# Patient Record
Sex: MALE | Race: WHITE | NOT HISPANIC OR LATINO | Employment: OTHER | URBAN - METROPOLITAN AREA
[De-identification: names, ages, dates, MRNs, and addresses within clinical notes are randomized per-mention and may not be internally consistent; named-entity substitution may affect disease eponyms.]

---

## 2020-06-20 ENCOUNTER — HOSPITAL ENCOUNTER (OUTPATIENT)
Dept: RADIOLOGY | Facility: MEDICAL CENTER | Age: 70
End: 2020-06-20
Payer: MEDICARE

## 2020-06-20 ENCOUNTER — APPOINTMENT (OUTPATIENT)
Dept: CARDIOLOGY | Facility: MEDICAL CENTER | Age: 70
DRG: 233 | End: 2020-06-20
Attending: STUDENT IN AN ORGANIZED HEALTH CARE EDUCATION/TRAINING PROGRAM
Payer: MEDICARE

## 2020-06-20 ENCOUNTER — HOSPITAL ENCOUNTER (INPATIENT)
Facility: MEDICAL CENTER | Age: 70
LOS: 11 days | DRG: 233 | End: 2020-07-01
Attending: EMERGENCY MEDICINE | Admitting: INTERNAL MEDICINE
Payer: MEDICARE

## 2020-06-20 DIAGNOSIS — I21.4 NSTEMI (NON-ST ELEVATED MYOCARDIAL INFARCTION) (HCC): ICD-10-CM

## 2020-06-20 DIAGNOSIS — Z95.1 S/P CABG X 3: ICD-10-CM

## 2020-06-20 DIAGNOSIS — J44.9 CHRONIC OBSTRUCTIVE PULMONARY DISEASE, UNSPECIFIED COPD TYPE (HCC): ICD-10-CM

## 2020-06-20 DIAGNOSIS — I25.9 CHEST PAIN DUE TO MYOCARDIAL ISCHEMIA, UNSPECIFIED ISCHEMIC CHEST PAIN TYPE: ICD-10-CM

## 2020-06-20 PROBLEM — K86.1 PANCREATITIS, CHRONIC (HCC): Status: ACTIVE | Noted: 2020-06-20

## 2020-06-20 PROBLEM — I10 HYPERTENSION: Status: ACTIVE | Noted: 2020-06-20

## 2020-06-20 PROBLEM — N40.0 BPH (BENIGN PROSTATIC HYPERPLASIA): Status: ACTIVE | Noted: 2020-06-20

## 2020-06-20 PROBLEM — N41.9 PROSTATITIS: Status: ACTIVE | Noted: 2020-06-20

## 2020-06-20 PROBLEM — G20.A1 PARKINSON'S DISEASE (HCC): Status: ACTIVE | Noted: 2020-06-20

## 2020-06-20 PROBLEM — E11.9 TYPE 2 DIABETES MELLITUS (HCC): Status: ACTIVE | Noted: 2020-06-20

## 2020-06-20 LAB
ALBUMIN SERPL BCP-MCNC: 3.6 G/DL (ref 3.2–4.9)
ALBUMIN/GLOB SERPL: 1.4 G/DL
ALP SERPL-CCNC: 61 U/L (ref 30–99)
ALT SERPL-CCNC: 6 U/L (ref 2–50)
ANION GAP SERPL CALC-SCNC: 9 MMOL/L (ref 7–16)
APTT PPP: 77.2 SEC (ref 24.7–36)
APTT PPP: 97 SEC (ref 24.7–36)
AST SERPL-CCNC: 21 U/L (ref 12–45)
BASOPHILS # BLD AUTO: 1 % (ref 0–1.8)
BASOPHILS # BLD: 0.09 K/UL (ref 0–0.12)
BILIRUB SERPL-MCNC: 0.2 MG/DL (ref 0.1–1.5)
BUN SERPL-MCNC: 13 MG/DL (ref 8–22)
CALCIUM SERPL-MCNC: 8.6 MG/DL (ref 8.5–10.5)
CHLORIDE SERPL-SCNC: 99 MMOL/L (ref 96–112)
CHOLEST SERPL-MCNC: 138 MG/DL (ref 100–199)
CK MB SERPL-MCNC: 35.1 NG/ML (ref 0–5)
CO2 SERPL-SCNC: 23 MMOL/L (ref 20–33)
CREAT SERPL-MCNC: 0.85 MG/DL (ref 0.5–1.4)
EKG IMPRESSION: NORMAL
EKG IMPRESSION: NORMAL
EOSINOPHIL # BLD AUTO: 0.1 K/UL (ref 0–0.51)
EOSINOPHIL NFR BLD: 1.1 % (ref 0–6.9)
ERYTHROCYTE [DISTWIDTH] IN BLOOD BY AUTOMATED COUNT: 48.6 FL (ref 35.9–50)
EST. AVERAGE GLUCOSE BLD GHB EST-MCNC: 140 MG/DL
GLOBULIN SER CALC-MCNC: 2.6 G/DL (ref 1.9–3.5)
GLUCOSE SERPL-MCNC: 122 MG/DL (ref 65–99)
HBA1C MFR BLD: 6.5 % (ref 0–5.6)
HCT VFR BLD AUTO: 43.6 % (ref 42–52)
HDLC SERPL-MCNC: 41 MG/DL
HGB BLD-MCNC: 14 G/DL (ref 14–18)
IMM GRANULOCYTES # BLD AUTO: 0.04 K/UL (ref 0–0.11)
IMM GRANULOCYTES NFR BLD AUTO: 0.4 % (ref 0–0.9)
INR PPP: 1 (ref 0.87–1.13)
LDLC SERPL CALC-MCNC: 77 MG/DL
LV EJECT FRACT  99904: 55
LV EJECT FRACT MOD 2C 99903: 65.38
LV EJECT FRACT MOD 4C 99902: 61.23
LV EJECT FRACT MOD BP 99901: 64.05
LYMPHOCYTES # BLD AUTO: 2.1 K/UL (ref 1–4.8)
LYMPHOCYTES NFR BLD: 22.6 % (ref 22–41)
MAGNESIUM SERPL-MCNC: 1.8 MG/DL (ref 1.5–2.5)
MCH RBC QN AUTO: 30.8 PG (ref 27–33)
MCHC RBC AUTO-ENTMCNC: 32.1 G/DL (ref 33.7–35.3)
MCV RBC AUTO: 96 FL (ref 81.4–97.8)
MONOCYTES # BLD AUTO: 0.87 K/UL (ref 0–0.85)
MONOCYTES NFR BLD AUTO: 9.4 % (ref 0–13.4)
NEUTROPHILS # BLD AUTO: 6.09 K/UL (ref 1.82–7.42)
NEUTROPHILS NFR BLD: 65.5 % (ref 44–72)
NRBC # BLD AUTO: 0 K/UL
NRBC BLD-RTO: 0 /100 WBC
NT-PROBNP SERPL IA-MCNC: 1207 PG/ML (ref 0–125)
PLATELET # BLD AUTO: 217 K/UL (ref 164–446)
PMV BLD AUTO: 9.2 FL (ref 9–12.9)
POTASSIUM SERPL-SCNC: 4.1 MMOL/L (ref 3.6–5.5)
PROT SERPL-MCNC: 6.2 G/DL (ref 6–8.2)
PROTHROMBIN TIME: 13.5 SEC (ref 12–14.6)
RBC # BLD AUTO: 4.54 M/UL (ref 4.7–6.1)
SODIUM SERPL-SCNC: 131 MMOL/L (ref 135–145)
TRIGL SERPL-MCNC: 100 MG/DL (ref 0–149)
TROPONIN T SERPL-MCNC: 261 NG/L (ref 6–19)
TROPONIN T SERPL-MCNC: 267 NG/L (ref 6–19)
TROPONIN T SERPL-MCNC: 463 NG/L (ref 6–19)
TROPONIN T SERPL-MCNC: 523 NG/L (ref 6–19)
WBC # BLD AUTO: 9.3 K/UL (ref 4.8–10.8)

## 2020-06-20 PROCEDURE — 770020 HCHG ROOM/CARE - TELE (206)

## 2020-06-20 PROCEDURE — 83880 ASSAY OF NATRIURETIC PEPTIDE: CPT

## 2020-06-20 PROCEDURE — 85025 COMPLETE CBC W/AUTO DIFF WBC: CPT

## 2020-06-20 PROCEDURE — 96365 THER/PROPH/DIAG IV INF INIT: CPT

## 2020-06-20 PROCEDURE — 93005 ELECTROCARDIOGRAM TRACING: CPT

## 2020-06-20 PROCEDURE — 99285 EMERGENCY DEPT VISIT HI MDM: CPT

## 2020-06-20 PROCEDURE — 82553 CREATINE MB FRACTION: CPT

## 2020-06-20 PROCEDURE — 700111 HCHG RX REV CODE 636 W/ 250 OVERRIDE (IP): Performed by: STUDENT IN AN ORGANIZED HEALTH CARE EDUCATION/TRAINING PROGRAM

## 2020-06-20 PROCEDURE — 85730 THROMBOPLASTIN TIME PARTIAL: CPT

## 2020-06-20 PROCEDURE — 93306 TTE W/DOPPLER COMPLETE: CPT

## 2020-06-20 PROCEDURE — 84153 ASSAY OF PSA TOTAL: CPT

## 2020-06-20 PROCEDURE — 83036 HEMOGLOBIN GLYCOSYLATED A1C: CPT

## 2020-06-20 PROCEDURE — 84484 ASSAY OF TROPONIN QUANT: CPT

## 2020-06-20 PROCEDURE — A9270 NON-COVERED ITEM OR SERVICE: HCPCS | Performed by: STUDENT IN AN ORGANIZED HEALTH CARE EDUCATION/TRAINING PROGRAM

## 2020-06-20 PROCEDURE — 93005 ELECTROCARDIOGRAM TRACING: CPT | Performed by: STUDENT IN AN ORGANIZED HEALTH CARE EDUCATION/TRAINING PROGRAM

## 2020-06-20 PROCEDURE — 93306 TTE W/DOPPLER COMPLETE: CPT | Mod: 26 | Performed by: INTERNAL MEDICINE

## 2020-06-20 PROCEDURE — 80061 LIPID PANEL: CPT

## 2020-06-20 PROCEDURE — 700102 HCHG RX REV CODE 250 W/ 637 OVERRIDE(OP): Performed by: STUDENT IN AN ORGANIZED HEALTH CARE EDUCATION/TRAINING PROGRAM

## 2020-06-20 PROCEDURE — 85610 PROTHROMBIN TIME: CPT

## 2020-06-20 PROCEDURE — 83735 ASSAY OF MAGNESIUM: CPT

## 2020-06-20 PROCEDURE — 99223 1ST HOSP IP/OBS HIGH 75: CPT | Mod: AI,GC | Performed by: INTERNAL MEDICINE

## 2020-06-20 PROCEDURE — 700111 HCHG RX REV CODE 636 W/ 250 OVERRIDE (IP): Performed by: EMERGENCY MEDICINE

## 2020-06-20 PROCEDURE — 93010 ELECTROCARDIOGRAM REPORT: CPT | Performed by: INTERNAL MEDICINE

## 2020-06-20 PROCEDURE — 99221 1ST HOSP IP/OBS SF/LOW 40: CPT | Performed by: INTERNAL MEDICINE

## 2020-06-20 PROCEDURE — 700117 HCHG RX CONTRAST REV CODE 255: Performed by: STUDENT IN AN ORGANIZED HEALTH CARE EDUCATION/TRAINING PROGRAM

## 2020-06-20 PROCEDURE — 80053 COMPREHEN METABOLIC PANEL: CPT

## 2020-06-20 PROCEDURE — 36415 COLL VENOUS BLD VENIPUNCTURE: CPT

## 2020-06-20 RX ORDER — ALBUTEROL SULFATE 90 UG/1
2 AEROSOL, METERED RESPIRATORY (INHALATION)
Status: DISCONTINUED | OUTPATIENT
Start: 2020-06-20 | End: 2020-06-25

## 2020-06-20 RX ORDER — PANTOPRAZOLE SODIUM 40 MG/1
40 TABLET, DELAYED RELEASE ORAL 2 TIMES DAILY
COMMUNITY

## 2020-06-20 RX ORDER — AMOXICILLIN 250 MG
2 CAPSULE ORAL 2 TIMES DAILY
Status: DISCONTINUED | OUTPATIENT
Start: 2020-06-20 | End: 2020-06-25

## 2020-06-20 RX ORDER — POLYETHYLENE GLYCOL 3350 17 G/17G
1 POWDER, FOR SOLUTION ORAL
Status: DISCONTINUED | OUTPATIENT
Start: 2020-06-20 | End: 2020-06-25

## 2020-06-20 RX ORDER — VALACYCLOVIR HYDROCHLORIDE 500 MG/1
1000 TABLET, FILM COATED ORAL DAILY
COMMUNITY

## 2020-06-20 RX ORDER — ATORVASTATIN CALCIUM 80 MG/1
80 TABLET, FILM COATED ORAL EVERY EVENING
Status: DISCONTINUED | OUTPATIENT
Start: 2020-06-20 | End: 2020-07-01 | Stop reason: HOSPADM

## 2020-06-20 RX ORDER — TAMSULOSIN HYDROCHLORIDE 0.4 MG/1
0.4 CAPSULE ORAL DAILY
Status: DISCONTINUED | OUTPATIENT
Start: 2020-06-20 | End: 2020-07-01 | Stop reason: HOSPADM

## 2020-06-20 RX ORDER — FINASTERIDE 5 MG/1
5 TABLET, FILM COATED ORAL DAILY
Status: DISCONTINUED | OUTPATIENT
Start: 2020-06-20 | End: 2020-07-01 | Stop reason: HOSPADM

## 2020-06-20 RX ORDER — HEPARIN SODIUM 5000 [USP'U]/100ML
INJECTION, SOLUTION INTRAVENOUS CONTINUOUS
Status: DISCONTINUED | OUTPATIENT
Start: 2020-06-20 | End: 2020-06-25

## 2020-06-20 RX ORDER — VALACYCLOVIR HYDROCHLORIDE 500 MG/1
1000 TABLET, FILM COATED ORAL DAILY
Status: COMPLETED | OUTPATIENT
Start: 2020-06-20 | End: 2020-06-30

## 2020-06-20 RX ORDER — OMEPRAZOLE 20 MG/1
20 CAPSULE, DELAYED RELEASE ORAL DAILY
Status: DISCONTINUED | OUTPATIENT
Start: 2020-06-20 | End: 2020-06-27

## 2020-06-20 RX ORDER — MAGNESIUM SULFATE HEPTAHYDRATE 40 MG/ML
2 INJECTION, SOLUTION INTRAVENOUS ONCE
Status: COMPLETED | OUTPATIENT
Start: 2020-06-20 | End: 2020-06-20

## 2020-06-20 RX ORDER — LOSARTAN POTASSIUM 25 MG/1
25 TABLET ORAL DAILY
COMMUNITY

## 2020-06-20 RX ORDER — HEPARIN SODIUM 1000 [USP'U]/ML
2600 INJECTION, SOLUTION INTRAVENOUS; SUBCUTANEOUS PRN
Status: DISCONTINUED | OUTPATIENT
Start: 2020-06-20 | End: 2020-06-25

## 2020-06-20 RX ORDER — PAROXETINE 30 MG/1
30 TABLET, FILM COATED ORAL DAILY
COMMUNITY
End: 2020-07-08

## 2020-06-20 RX ORDER — SULFAMETHOXAZOLE AND TRIMETHOPRIM 800; 160 MG/1; MG/1
1 TABLET ORAL EVERY 12 HOURS
Status: COMPLETED | OUTPATIENT
Start: 2020-06-20 | End: 2020-06-27

## 2020-06-20 RX ORDER — PAROXETINE 10 MG/1
40 TABLET, FILM COATED ORAL DAILY
Status: DISCONTINUED | OUTPATIENT
Start: 2020-06-20 | End: 2020-07-01 | Stop reason: HOSPADM

## 2020-06-20 RX ORDER — ALPRAZOLAM 1 MG/1
1 TABLET ORAL NIGHTLY PRN
COMMUNITY

## 2020-06-20 RX ORDER — BISACODYL 10 MG
10 SUPPOSITORY, RECTAL RECTAL
Status: DISCONTINUED | OUTPATIENT
Start: 2020-06-20 | End: 2020-06-25

## 2020-06-20 RX ORDER — FINASTERIDE 5 MG/1
5 TABLET, FILM COATED ORAL DAILY
COMMUNITY

## 2020-06-20 RX ORDER — TAMSULOSIN HYDROCHLORIDE 0.4 MG/1
0.4 CAPSULE ORAL DAILY
COMMUNITY

## 2020-06-20 RX ADMIN — HEPARIN SODIUM 1200 UNITS: 5000 INJECTION, SOLUTION INTRAVENOUS at 08:13

## 2020-06-20 RX ADMIN — VALACYCLOVIR HYDROCHLORIDE 1000 MG: 500 TABLET, FILM COATED ORAL at 17:43

## 2020-06-20 RX ADMIN — HUMAN ALBUMIN MICROSPHERES AND PERFLUTREN 3 ML: 10; .22 INJECTION, SOLUTION INTRAVENOUS at 16:58

## 2020-06-20 RX ADMIN — PAROXETINE HYDROCHLORIDE 40 MG: 20 TABLET, FILM COATED ORAL at 17:42

## 2020-06-20 RX ADMIN — SULFAMETHOXAZOLE AND TRIMETHOPRIM 1 TABLET: 800; 160 TABLET ORAL at 17:42

## 2020-06-20 RX ADMIN — ATORVASTATIN CALCIUM 80 MG: 80 TABLET, FILM COATED ORAL at 17:42

## 2020-06-20 RX ADMIN — TAMSULOSIN HYDROCHLORIDE 0.4 MG: 0.4 CAPSULE ORAL at 17:42

## 2020-06-20 RX ADMIN — PANCRELIPASE 6000 UNITS: 30000; 6000; 19000 CAPSULE, DELAYED RELEASE PELLETS ORAL at 17:43

## 2020-06-20 RX ADMIN — FINASTERIDE 5 MG: 5 TABLET, FILM COATED ORAL at 17:42

## 2020-06-20 RX ADMIN — OMEPRAZOLE 20 MG: 20 CAPSULE, DELAYED RELEASE ORAL at 10:43

## 2020-06-20 RX ADMIN — HEPARIN SODIUM 1050 UNITS/HR: 5000 INJECTION, SOLUTION INTRAVENOUS at 06:56

## 2020-06-20 RX ADMIN — CARBIDOPA AND LEVODOPA 1 TABLET: 25; 100 TABLET ORAL at 17:41

## 2020-06-20 RX ADMIN — MAGNESIUM SULFATE 2 G: 2 INJECTION INTRAVENOUS at 17:44

## 2020-06-20 ASSESSMENT — ENCOUNTER SYMPTOMS
PALPITATIONS: 0
TREMORS: 1
COUGH: 0
EYE PAIN: 0
NERVOUS/ANXIOUS: 0
MYALGIAS: 0
LOSS OF CONSCIOUSNESS: 0
ORTHOPNEA: 1
CHILLS: 0
DIZZINESS: 0
EYE DISCHARGE: 0
DIARRHEA: 0
COUGH: 1
FEVER: 0
CONSTIPATION: 0
SPEECH CHANGE: 0
BRUISES/BLEEDS EASILY: 0
HEMOPTYSIS: 0
SORE THROAT: 0
FLANK PAIN: 0
SHORTNESS OF BREATH: 1
VOMITING: 1
BLURRED VISION: 0
ABDOMINAL PAIN: 0
SEIZURES: 0
DEPRESSION: 1
WHEEZING: 0
FOCAL WEAKNESS: 0
ABDOMINAL PAIN: 1
DOUBLE VISION: 0
WEIGHT LOSS: 1
DEPRESSION: 0
WEAKNESS: 1
SENSORY CHANGE: 0
NAUSEA: 1
DIZZINESS: 1

## 2020-06-20 ASSESSMENT — COGNITIVE AND FUNCTIONAL STATUS - GENERAL
DAILY ACTIVITIY SCORE: 24
SUGGESTED CMS G CODE MODIFIER MOBILITY: CH
SUGGESTED CMS G CODE MODIFIER DAILY ACTIVITY: CH
MOBILITY SCORE: 24

## 2020-06-20 ASSESSMENT — LIFESTYLE VARIABLES
TOTAL SCORE: 0
AVERAGE NUMBER OF DAYS PER WEEK YOU HAVE A DRINK CONTAINING ALCOHOL: 0
CONSUMPTION TOTAL: NEGATIVE
TOTAL SCORE: 0
ON A TYPICAL DAY WHEN YOU DRINK ALCOHOL HOW MANY DRINKS DO YOU HAVE: 0
EVER_SMOKED: NEVER
EVER HAD A DRINK FIRST THING IN THE MORNING TO STEADY YOUR NERVES TO GET RID OF A HANGOVER: NO
HAVE YOU EVER FELT YOU SHOULD CUT DOWN ON YOUR DRINKING: NO
TOTAL SCORE: 0
DO YOU DRINK ALCOHOL: NO
HAVE PEOPLE ANNOYED YOU BY CRITICIZING YOUR DRINKING: NO
EVER FELT BAD OR GUILTY ABOUT YOUR DRINKING: NO
HOW MANY TIMES IN THE PAST YEAR HAVE YOU HAD 5 OR MORE DRINKS IN A DAY: 0

## 2020-06-20 ASSESSMENT — PATIENT HEALTH QUESTIONNAIRE - PHQ9
1. LITTLE INTEREST OR PLEASURE IN DOING THINGS: NOT AT ALL
SUM OF ALL RESPONSES TO PHQ9 QUESTIONS 1 AND 2: 0
2. FEELING DOWN, DEPRESSED, IRRITABLE, OR HOPELESS: NOT AT ALL

## 2020-06-20 ASSESSMENT — PAIN DESCRIPTION - DESCRIPTORS: DESCRIPTORS: ACHING

## 2020-06-20 ASSESSMENT — FIBROSIS 4 INDEX: FIB4 SCORE: 2.73

## 2020-06-20 NOTE — ED NOTES
Claudio from Lab called with critical result of troponin 256 at 0726. Critical lab result read back to Claudio.   Dr. Marcum notified of critical lab result at 0726.  Critical lab result read back by Dr. Marcum.

## 2020-06-20 NOTE — ASSESSMENT & PLAN NOTE
History of prostatitis, on fifth week of Bactrim at home.  Finished possible 6 weeks course of Bactrim.  Patient not sure about exact duration  -Repeat UA normal

## 2020-06-20 NOTE — ASSESSMENT & PLAN NOTE
-Known history of COPD, on inhalers at home, ran out of medication recently. Not in exacerbation. CXR from outside grossly unremarkable except for right basilar atelectasis.  -RT protocol,  - discharge home with home O2 support

## 2020-06-20 NOTE — ASSESSMENT & PLAN NOTE
-Known history of type 2 diabetes mellitus.  On metformin at home.  HbA1c 6.5   -continue metformin  -Maintain euglycemia, blood glucose 140 to 180 mg/dL

## 2020-06-20 NOTE — ASSESSMENT & PLAN NOTE
Patient admitted with NSTEMI.  Cardiac cath [6/22/2020] remarkable for multivessel disease, EF 45%.  CABG recommended. S/p CABG on 6/25/2020.  HbA1c 6.5, lipid panel normal.      -Continue ASA, plavix, statin, lasix  -Continue low-dose metoprolol.   -ACEI/ARB with held due to low SBP, restart PRN  -medically clear for discharge, with outpatient Cardiology follow up

## 2020-06-20 NOTE — ED TRIAGE NOTES
"Ricky Spangler  69 y.o. male  Chief Complaint   Patient presents with   • Chest Pain     Transfer from [facility] for NSTEMI, heparin drip started before transport, pt denies CP currently. HR 57 /58. Pt recently admitted for pancreatitis          Hx: pancreatitis, depression, copd, HTN, GERD, parkinson's, Prostate and pancreatic cancer, DM     Blood Pressure : 106/58, Pulse: (!) 58, Respiration: 16, Temperature: 36.1 °C (97 °F), Height: 170.2 cm (5' 7\"), Weight: 82 kg (180 lb 12.4 oz), BMI (Calculated): 28.31, BSA (Calculated): 2, Pulse Oximetry: 93 %, O2 (LPM): 2, O2 Delivery Device: Nasal Cannula    "

## 2020-06-20 NOTE — ED NOTES
Report from Perla MARIN, assumed care of pt.     Verified heparin infusion at 1050 units/hr. Pt lying in stretcher with eyes closed. Waiting for admission. Call light within reach.

## 2020-06-20 NOTE — PROGRESS NOTES
2 RN skin check complete.   Devices in place NA.  Skin assessed under devices intact.  Confirmed pressure ulcers found on NA.  New potential pressure ulcers noted on NA.   The following interventions in place, Patient turns often in bed, patient encouraged to walk often in bed.

## 2020-06-20 NOTE — ED PROVIDER NOTES
"ED Provider Note    CHIEF COMPLAINT  Chief Complaint   Patient presents with   • Chest Pain     Transfer from [facility] for NSTEMI, heparin drip started before transport, pt denies CP currently. HR 57 /58. Pt recently admitted for pancreatitis        HPI  Ricky Spangler is a 69 y.o. male who presents to the emergency department by air ambulance from outside facility for chest pain,NSTEMI.  Patient states yesterday afternoon he began feeling ill, nauseous with a couple episodes of vomiting.  He had anterior chest discomfort, sharp, pressure that radiated into his upper abdomen.  No back pain.  No syncope.  No palpitations.  Shortness of breath noted as well.  No cough or congestion.  No fever chills.    Distant history colon cancer, patient states currently with pancreatic and prostate cancer, he chose not to treat this.  History of COPD, Parkinson's, hypertension, diabetes.    Denies heart disease, previous MI, stenting.    REVIEW OF SYSTEMS  See HPI for further details. All other systems are negative.    PAST MEDICAL HISTORY   As above    SOCIAL HISTORY  Social History     Tobacco Use   • Smoking status: Not on file   Substance and Sexual Activity   • Alcohol use: Not on file   • Drug use: Not on file   • Sexual activity: Not on file   Denies drugs and alcohol    SURGICAL HISTORY  patient denies any surgical history    CURRENT MEDICATIONS  Home Medications    **Home medications have not yet been reviewed for this encounter**     See list.    ALLERGIES  Allergies   Allergen Reactions   • Tetanus Toxoid Swelling       PHYSICAL EXAM  VITAL SIGNS: /68   Pulse 61   Temp 36.1 °C (97 °F) (Temporal)   Resp 19   Ht 1.702 m (5' 7\")   Wt 82 kg (180 lb 12.4 oz)   SpO2 93%   BMI 28.31 kg/m²   Pulse ox interpretation: I interpret this pulse ox as normal.  Constitutional: Alert in no apparent distress.  HENT: Normocephalic, atraumatic. Bilateral external ears normal, Nose normal. Moist mucous membranes.  "   Eyes: Pupils are equal and reactive, Conjunctiva normal.   Neck: Normal range of motion, Supple.  No JVD.  Lymphatic: No lymphadenopathy noted.   Cardiovascular: Regular rate and rhythm, no murmurs. Distal pulses intact.  No peripheral edema.  Thorax & Lungs: Normal breath sounds.  No wheezing/rales/ronchi. No increased work of breathing, clipped speech or retractions.   Abdomen: Soft, non-distended, non-tender to palpation. No palpable or pulsatile masses.   Skin: Warm, Dry, No erythema, No rash.   Musculoskeletal: Good range of motion in all major joints.   Neurologic: Alert oriented x4.  Speech clear and cohesive.  Moves 4 extremity spontaneously.  Psychiatric: Affect normal, Judgment normal, Mood normal.       DIAGNOSTIC STUDIES / PROCEDURES    LABS  Results for orders placed or performed during the hospital encounter of 06/20/20   CBC w/ Differential   Result Value Ref Range    WBC 9.3 4.8 - 10.8 K/uL    RBC 4.54 (L) 4.70 - 6.10 M/uL    Hemoglobin 14.0 14.0 - 18.0 g/dL    Hematocrit 43.6 42.0 - 52.0 %    MCV 96.0 81.4 - 97.8 fL    MCH 30.8 27.0 - 33.0 pg    MCHC 32.1 (L) 33.7 - 35.3 g/dL    RDW 48.6 35.9 - 50.0 fL    Platelet Count 217 164 - 446 K/uL    MPV 9.2 9.0 - 12.9 fL    Neutrophils-Polys 65.50 44.00 - 72.00 %    Lymphocytes 22.60 22.00 - 41.00 %    Monocytes 9.40 0.00 - 13.40 %    Eosinophils 1.10 0.00 - 6.90 %    Basophils 1.00 0.00 - 1.80 %    Immature Granulocytes 0.40 0.00 - 0.90 %    Nucleated RBC 0.00 /100 WBC    Neutrophils (Absolute) 6.09 1.82 - 7.42 K/uL    Lymphs (Absolute) 2.10 1.00 - 4.80 K/uL    Monos (Absolute) 0.87 (H) 0.00 - 0.85 K/uL    Eos (Absolute) 0.10 0.00 - 0.51 K/uL    Baso (Absolute) 0.09 0.00 - 0.12 K/uL    Immature Granulocytes (abs) 0.04 0.00 - 0.11 K/uL    NRBC (Absolute) 0.00 K/uL   Complete Metabolic Panel (CMP)   Result Value Ref Range    Sodium 131 (L) 135 - 145 mmol/L    Potassium 4.1 3.6 - 5.5 mmol/L    Chloride 99 96 - 112 mmol/L    Co2 23 20 - 33 mmol/L    Anion  Gap 9.0 7.0 - 16.0    Glucose 122 (H) 65 - 99 mg/dL    Bun 13 8 - 22 mg/dL    Calcium 8.6 8.5 - 10.5 mg/dL    AST(SGOT) 21 12 - 45 U/L    ALT(SGPT) 6 2 - 50 U/L    Alkaline Phosphatase 61 30 - 99 U/L    Total Bilirubin 0.2 0.1 - 1.5 mg/dL    Albumin 3.6 3.2 - 4.9 g/dL    Total Protein 6.2 6.0 - 8.2 g/dL    Globulin 2.6 1.9 - 3.5 g/dL    A-G Ratio 1.4 g/dL   Troponin STAT   Result Value Ref Range    Troponin T 267 (H) 6 - 19 ng/L   APTT   Result Value Ref Range    APTT 77.2 (H) 24.7 - 36.0 sec   PT/INR   Result Value Ref Range    PT 13.5 12.0 - 14.6 sec    INR 1.00 0.87 - 1.13   EKG   Result Value Ref Range    Report       [facility] Emergency Dept.    Test Date:  2020  Pt Name:    MAX SALAS               Department: ER  MRN:        [id number]                      Room:        12  Gender:     Male                         Technician: [id number]  :        1950                   Requested By:REX WINSTON  Order #:    [id number]                    Reading MD: REX WINSTON DO    Measurements  Intervals                                Axis  Rate:       56                           P:          54  NJ:         152                          QRS:        -72  QRSD:       118                          T:          35  QT:         404  QTc:        390    Interpretive Statements  SINUS BRADYCARDIA  INCOMPLETE RIGHT BUNDLE BRANCH BLOCK  PROBABLE INFERIOR INFARCT, AGE INDETERMINATE  No previous ECG available for comparison  Electronically Signed On 2020 7:59:42 PDT by REX WINSTON DO           RADIOLOGY  OUTSIDE IMAGES-CT ABDOMEN /PELVIS   Final Result      OUTSIDE IMAGES-DX CHEST   Final Result          COURSE & MEDICAL DECISION MAKING  Patient transferred from outside facility for chest pain, NSTEMI.  Rise with heparin drip infusing.  Nitroglycerin paste to anterior chest wall.  Patient describes 1-2 out of 10 chest pain, much improved with outside interventions.  Troponin elevated  0.24 at outside facility (normal less than 0.05).  Chemistry within normal limits.  No coagulopathy.  Normal sed rate.  Nonspecific mild leukocytosis, WBC 11.7.  EKG with right bundle branch block, no ischemia.  Chest x-ray without acute cardiopulmonary process.  Mild right basilar atelectasis/scarring.  Of note, patient also had CT abdomen pelvis with contrast earlier this week, no mass, lymphadenopathy or focal inflammatory process.  Moderate to severe left-sided colonic diverticulosis without diverticulitis.  Transferred for higher level of care, cardiology consultation.    Patient seen evaluated upon arrival in room 12.  Minimal, 1-2 out of 10, chest pain persistent at this time.  States much improved since onset yesterday and before presentation overnight.  EKG with sinus bradycardia, rate 56, no STEMI or other ischemia.  Continuous telemetry thus far without ectopy or arrhythmia.  Repeat labs, trend troponin.  Continue heparin drip, nitroglycerin paste.  Plan admission for further evaluation, cardiology consultation.    0640 - [facility] IM resident is aware of the patient agreeable to consultation.    FINAL IMPRESSION  (I21.4) NSTEMI (non-ST elevated myocardial infarction) (HCC)  (I25.9) Chest pain due to myocardial ischemia, unspecified ischemic chest pain type      Electronically signed by: Kendra Marcum D.O., 6/20/2020 7:54 AM      This dictation was created using voice recognition software. The accuracy of the dictation is limited to the abilities of the software. I expect there may be some errors of grammar and possibly content. The nursing notes were reviewed and certain aspects of this information were incorporated into this note.

## 2020-06-20 NOTE — NON-PROVIDER
History & Physical Note    Date of Admission: 6/20/2020  Admission Status: Emergency  [facility] Team: [facility] Blue Team and [facility] IM [facility] Team  Attending: Kendra Marcum D.O.   Senior Resident: Dr. Campbell   Intern: Dr. Suarez  Contact Number: [phone]    Chief Complaint: Chest pain    History of Present Illness (HPI):   Ricky is a 69 y.o. male with a past medical history of pancreatitis, depression, COPD, HTN, GERD, Parkinson's, prostate and pancreatic cancer, DM transferred 6/20/2020 from [facility] for NSTEMI. Heparin drip was started before transport.     Patient reports sharp gradually worsening substernal chest pain radiating to both shoulder over the past two weeks. Chest pain was associated with nausea, generalized weakness, and three episodes of vomiting over the past 3 days. Pain is improved now.       In ER, HR 57, /58 > 96/48. Troponin 0.24  (outside) > 0.35 (outside) > 265 ([facility]). Normal chemistry, normal sed rate, mild leukocytosis, WBC 11.7. EKG showed RBBB, no ischemia. Chest X-Ray negative. DEJUAN score 4 (20% risk at 14 days of: all-cause mortality, new or recurrent MI, or severe recurrent ischemia requiring urgent revascularization). HEART score 7 (Risk of MACE of 50-65%).      Review of Systems:   Review of Systems   Constitutional: Positive for weight loss. Negative for chills and fever.   HENT: Negative for congestion and sore throat.    Eyes: Negative for blurred vision.   Respiratory: Positive for cough and shortness of breath.    Cardiovascular: Positive for chest pain and orthopnea. Negative for leg swelling.   Gastrointestinal: Positive for abdominal pain, nausea and vomiting. Negative for diarrhea.   Genitourinary: Positive for dysuria and urgency.   Musculoskeletal: Positive for joint pain.   Skin: Negative for rash.   Neurological: Positive for dizziness, tremors and weakness. Negative for focal weakness.   Psychiatric/Behavioral: Positive for depression.         Past Medical  History:   Pancreatic cancer  Prostate cancer  Pancreatitis  COPD  HTN  GERD  Parkinson's  DM  Colon polyps  Past Medical History was reviewed with patient.    Past Surgical History:   Colectomy 10 years ago, polyps  Past Surgical History was reviewed with patient.    Medications: Medications have been reviewed with patient.    Home Medications    Medication Sig Taking? Last Dose Authorizing Provider   carbidopa-levodopa (SINEMET)  MG Tab Take 1 Tab by mouth 3 times a day. Yes  Physician Outpatient   valACYclovir (VALTREX) 500 MG Tab Take 1,000 mg by mouth every day. Yes  Physician Outpatient   losartan (COZAAR) 25 MG Tab Take 25 mg by mouth every day. Yes  Physician Outpatient   pantoprazole (PROTONIX) 40 MG Tablet Delayed Response Take 40 mg by mouth 2 times a day. Yes  Physician Outpatient   tamsulosin (FLOMAX) 0.4 MG capsule Take 0.4 mg by mouth every day. Yes  Physician Outpatient   PARoxetine (PAXIL) 30 MG Tab Take 40 mg by mouth every day. Yes  Physician Outpatient   finasteride (PROSCAR) 5 MG Tab Take 5 mg by mouth every day. Yes  Physician Outpatient   pancrelipase, Lip-Prot-Amyl, (CREON 6000) 6000 units Cap DR Particles Take 1 Cap by mouth 3 times a day, with meals. Yes  Physician Outpatient   metFORMIN (GLUCOPHAGE) 500 MG Tab Take 500 mg by mouth 2 times a day, with meals. Yes  Physician Outpatient   ALPRAZolam (XANAX) 1 MG Tab Take 1 mg by mouth at bedtime as needed for Sleep. Yes  Physician Outpatient      Allergies: Allergies have been reviewed with patient.  Allergies   Allergen Reactions   • Tetanus Toxoid Swelling       Family History:   Parkinson's- Mother, sister, brother  DM- Sister  HTN- Sister  Heart disease- Father dies at 52    Social History:   Tobacco: Quit at age 21   Alcohol: Denies  Recreational drugs (illegal and prescription):  Denies   Employment: Retired  Activity Level: Ambulates with cane   Living situation:  Lives in [place] with wife  Recent travel:  None  Primary Care  Provider: reviewed Dr. Marcos Damon [facility]  Other (stressors, spirituality, exposures):  None  Physical Exam:   Vitals:  Temp:  [36.1 °C (97 °F)] 36.1 °C (97 °F)  Pulse:  [58] 58  Resp:  [16] 16  BP: (106)/(58) 106/58  SpO2:  [93 %] 93 %    Physical Exam  Constitutional:       General: He is not in acute distress.  HENT:      Head: Normocephalic and atraumatic.   Eyes:      Extraocular Movements: Extraocular movements intact.      Conjunctiva/sclera: Conjunctivae normal.   Neck:      Musculoskeletal: Normal range of motion and neck supple.   Cardiovascular:      Rate and Rhythm: Normal rate and regular rhythm.      Pulses: Normal pulses.      Heart sounds: No murmur. No friction rub. No gallop.    Pulmonary:      Effort: Pulmonary effort is normal. No respiratory distress.      Breath sounds: Rhonchi present. No wheezing or rales.   Abdominal:      General: Bowel sounds are normal.      Palpations: Abdomen is soft. There is no mass.      Tenderness: There is abdominal tenderness. There is no rebound.      Comments: TTP over RUQ, LUQ, and epigastric region    Musculoskeletal: Normal range of motion.         General: No swelling.      Right lower leg: No edema.      Left lower leg: No edema.   Skin:     General: Skin is warm and dry.      Capillary Refill: Capillary refill takes less than 2 seconds.   Neurological:      General: No focal deficit present.      Mental Status: He is alert.      Cranial Nerves: No cranial nerve deficit.         Labs:   Results for orders placed or performed during the hospital encounter of 06/20/20   CBC w/ Differential   Result Value Ref Range    WBC 9.3 4.8 - 10.8 K/uL    RBC 4.54 (L) 4.70 - 6.10 M/uL    Hemoglobin 14.0 14.0 - 18.0 g/dL    Hematocrit 43.6 42.0 - 52.0 %    MCV 96.0 81.4 - 97.8 fL    MCH 30.8 27.0 - 33.0 pg    MCHC 32.1 (L) 33.7 - 35.3 g/dL    RDW 48.6 35.9 - 50.0 fL    Platelet Count 217 164 - 446 K/uL    MPV 9.2 9.0 - 12.9 fL    Neutrophils-Polys 65.50 44.00 -  72.00 %    Lymphocytes 22.60 22.00 - 41.00 %    Monocytes 9.40 0.00 - 13.40 %    Eosinophils 1.10 0.00 - 6.90 %    Basophils 1.00 0.00 - 1.80 %    Immature Granulocytes 0.40 0.00 - 0.90 %    Nucleated RBC 0.00 /100 WBC    Neutrophils (Absolute) 6.09 1.82 - 7.42 K/uL    Lymphs (Absolute) 2.10 1.00 - 4.80 K/uL    Monos (Absolute) 0.87 (H) 0.00 - 0.85 K/uL    Eos (Absolute) 0.10 0.00 - 0.51 K/uL    Baso (Absolute) 0.09 0.00 - 0.12 K/uL    Immature Granulocytes (abs) 0.04 0.00 - 0.11 K/uL    NRBC (Absolute) 0.00 K/uL   Complete Metabolic Panel (CMP)   Result Value Ref Range    Sodium 131 (L) 135 - 145 mmol/L    Potassium 4.1 3.6 - 5.5 mmol/L    Chloride 99 96 - 112 mmol/L    Co2 23 20 - 33 mmol/L    Anion Gap 9.0 7.0 - 16.0    Glucose 122 (H) 65 - 99 mg/dL    Bun 13 8 - 22 mg/dL    Calcium 8.6 8.5 - 10.5 mg/dL    AST(SGOT) 21 12 - 45 U/L    ALT(SGPT) 6 2 - 50 U/L    Alkaline Phosphatase 61 30 - 99 U/L    Total Bilirubin 0.2 0.1 - 1.5 mg/dL    Albumin 3.6 3.2 - 4.9 g/dL    Total Protein 6.2 6.0 - 8.2 g/dL    Globulin 2.6 1.9 - 3.5 g/dL    A-G Ratio 1.4 g/dL   Troponin STAT   Result Value Ref Range    Troponin T 267 (H) 6 - 19 ng/L   APTT   Result Value Ref Range    APTT 77.2 (H) 24.7 - 36.0 sec   PT/INR   Result Value Ref Range    PT 13.5 12.0 - 14.6 sec    INR 1.00 0.87 - 1.13   EKG   Result Value Ref Range    Report       [facility] Emergency Dept.    Test Date:  2020  Pt Name:    MAX SALAS               Department: ER  MRN:        [id number]                      Room:       RD 12  Gender:     Male                         Technician: [id number]  :        1950                   Requested By:REX WINSTON  Order #:    [id number]                    Reading MD: REX WINSTON, DO    Measurements  Intervals                                Axis  Rate:       56                           P:          54  KS:         152                          QRS:        -72  QRSD:       118                           T:          35  QT:         404  QTc:        390    Interpretive Statements  SINUS BRADYCARDIA  INCOMPLETE RIGHT BUNDLE BRANCH BLOCK  PROBABLE INFERIOR INFARCT, AGE INDETERMINATE  No previous ECG available for comparison  Electronically Signed On 6- 7:59:42 PDT by REX WINSTON DO         EKG:  Interpretive Statements   SINUS BRADYCARDIA   INCOMPLETE RIGHT BUNDLE BRANCH BLOCK   PROBABLE INFERIOR INFARCT, AGE INDETERMINATE   No previous ECG available for comparison     Imaging:   Outside chest X-Ray: No acute cardiopulmonary process. Mild right basilar atelectasis/scarring.     Outside CT abdomen pelvis: No mass, lymphadenopathy or focal inflammatory process. Moderate to severe left-sided colonic diverticulosis without diverticulitis.    Previous Data Review: reviewed    NSTEMI:   2 days of typical chest pain and elevated cardiac enzymes. Transferred from [facility], heparin drip initiated prior to transfer. Hemodynamically stable. DNAR/DNI.    - Continue heparin  - Telemetry monitoring  - ASA and clopidogrel (hold if cath planned)  - High dose statin  - Hold BB and ARB for BP and rate support   - BNP  - Lipid studies  - Echo  - R sided EKG  - Cardiology consult     HTN:  History of chronic hypertension. /58 > 96/48    - Continue metoprolol and losartan when BP will tolerate    DM II:  History of diabetes. Kidney function stable.    -Continue home metformin, hold if GFR > 50 cc/min  -Monitor kidney function    Prostatitis:  Prostatitis. On 5th week of Bactrim.    -Finish course of Bactrim    Parkinson's:  Stable.     -Continue home carbidopa-levodopa    Chronic pancreatitis/Pancreatic Cancer  Chronic pancreatitis on top of pancreatic cancer.    -Continue pancrealipase  -Follow up outpatient    COPD  Non-smoker. Has not used albuterol and Duo-neb for some time.    -Pulse ox  -Incentive spirometer  -Early mobilization     BPH:  History of BPH. Stable    - Continue home tamsulosin and  finasteride

## 2020-06-20 NOTE — CONSULTS
Cardiology consult    Requested by Dr. ROCKY Pires      REASON FOR CONSULT: ACS    HPI: 69-year-old white male transferred from [facility] secondary to chest pain, nausea vomiting and abdominal pain.  Minimal records sent up from [facility].  Supposedly history of pancreatitis and pancreatic cancer not yet seen oncologist.  Currently pain-free.  Mild elevation troponins.  Diabetes mellitus.  Parkinson's disease.  Hypertension.  Currently on heparin.  Denies smoking previously did smoke.  Denies significant alcohol.  Retired.  .  2 children.  No previous cardiac history.  Mildly somnolent possibly related to sleep deprivation but able to answer questions appropriately.    MEDICATIONS:      Current Facility-Administered Medications:   •  heparin injection 2,600 Units, 2,600 Units, Intravenous, PRN **AND** heparin infusion 25,000 units in 500 mL 0.45% NACL, , Intravenous, Continuous, Last Rate: 21 mL/hr at 06/20/20 0656, 1,200 Units at 06/20/20 0813 **AND** Protocol 440 Heparin Weight Based DO NOT GIVE ANY HEPARIN BOLUS TO STROKE PATIENT, , , CONTINUOUS **AND** Protocol 440 Heparin Weight Based Discontinue Enoxaparin (Lovenox), Dabigatran (Pradaxa), Rivaroxaban (Xarelto), Apixaban (Eliquis), Edoxaban (Savaysa, Lixiana), Fondaparinux (Arixtra) and Argatroban prior to heparin administration, , , Once **AND** Protocol 440 Heparin Weight Based Draw baseline aPTT, PT, and platelet count if not already done, , , CONTINUOUS **AND** Protocol 440 Heparin Weight Based Draw aPTT 6 hours after beginning infusion. , , , CONTINUOUS **AND** Protocol 440 Heparin Weight Based Record Patient Data, , , CONTINUOUS **AND** Protocol 440 Heparin Weight Based INSTRUCTIONS, , , CONTINUOUS **AND** Protocol 440 Heparin Weight Based Review aPTT results 6 hours after infusion is begun as detailed, , , CONTINUOUS **AND** Protocol 440 Heparin Weight Based Draw Platelet count every three days. Contact MD if platelet is 50% lower  than baseline count., , , CONTINUOUS **AND** Protocol 440 Heparin Weight Based Adjust heparin to maintain aPTT between 55-96 sec, , , CONTINUOUS **AND** Protocol 440 Heparin Weight Based Order aPTT 6 hours after any rate change or hold until aPTT is therapeutic (55-96 seconds), , , CONTINUOUS **AND** Protocol 440 Heparin Weight Based Documentation and verification, , , CONTINUOUS, Kendra Marcum D.O.  •  senna-docusate (PERICOLACE or SENOKOT S) 8.6-50 MG per tablet 2 Tab, 2 Tab, Oral, BID **AND** polyethylene glycol/lytes (MIRALAX) PACKET 1 Packet, 1 Packet, Oral, QDAY PRN **AND** magnesium hydroxide (MILK OF MAGNESIA) suspension 30 mL, 30 mL, Oral, QDAY PRN **AND** bisacodyl (DULCOLAX) suppository 10 mg, 10 mg, Rectal, QDAY PRN, Jia Suarez M.D.  •  [START ON 6/21/2020] aspirin EC (ECOTRIN) tablet 81 mg, 81 mg, Oral, DAILY, Jia Suarez M.D.  •  atorvastatin (LIPITOR) tablet 80 mg, 80 mg, Oral, Q EVENING, Jia Suarez M.D.  •  omeprazole (PRILOSEC) capsule 20 mg, 20 mg, Oral, DAILY, Jia Suarez M.D.    ALLERGIES:   Mr. Spangler  is allergic to tetanus toxoid.     SURGERIES:  Mr. Spangler   has no past surgical history on file.     MEDICAL ILLNESSES:  Mr. Spangler   has no past medical history on file.     SOCIAL HISTORY:  Mr. Spangler        FAMILY HISTORY:  Mr.'s Spangler  family history is not on file.      ROS:  Review of Systems   Constitutional: Positive for malaise/fatigue. Negative for chills and fever.   HENT: Negative for congestion.    Eyes: Negative for blurred vision, pain and discharge.   Respiratory: Negative for cough, hemoptysis and wheezing.    Cardiovascular: Positive for chest pain. Negative for palpitations.   Gastrointestinal: Positive for nausea and vomiting. Negative for abdominal pain.   Musculoskeletal: Negative for joint pain and myalgias.   Skin: Negative for itching and rash.   Neurological: Negative for speech change and loss of consciousness.   Endo/Heme/Allergies: Does not  "bruise/bleed easily.   Psychiatric/Behavioral: Negative for depression. The patient is not nervous/anxious.    All other systems reviewed and are negative.    In addition to the above, a complete review of system was performed and all other organs systems were normal    PHYSICAL EXAM:  Physical Exam   Constitutional: He is oriented to person, place, and time and well-developed, well-nourished, and in no distress.   HENT:   Head: Normocephalic and atraumatic.   Mouth/Throat: Oropharynx is clear and moist.   Eyes: Pupils are equal, round, and reactive to light. EOM are normal.   Neck: Normal range of motion. Neck supple. No thyromegaly present.   Cardiovascular: Normal rate, regular rhythm, normal heart sounds and intact distal pulses. Exam reveals no gallop and no friction rub.   No murmur heard.  Pulmonary/Chest: Effort normal and breath sounds normal.   Abdominal: Soft. Bowel sounds are normal.   Musculoskeletal: Normal range of motion.         General: No tenderness or edema.   Neurological: He is oriented to person, place, and time.   Mildly somnolent   Skin: Skin is warm and dry. No rash noted.   Psychiatric: Mood, memory, affect and judgment normal.       BP (!) 96/48   Pulse (!) 54   Temp 36.2 °C (97.2 °F) (Temporal)   Resp 18   Ht 1.702 m (5' 7\")   Wt 81.5 kg (179 lb 10.8 oz)   SpO2 97%   BMI 28.14 kg/m²      No results found for: CHOLSTRLTOT, LDL, HDL, TRIGLYCERIDE    Lab Results   Component Value Date/Time    SODIUM 131 (L) 06/20/2020 06:23 AM    POTASSIUM 4.1 06/20/2020 06:23 AM    CHLORIDE 99 06/20/2020 06:23 AM    CO2 23 06/20/2020 06:23 AM    GLUCOSE 122 (H) 06/20/2020 06:23 AM    BUN 13 06/20/2020 06:23 AM    CREATININE 0.85 06/20/2020 06:23 AM     Lab Results   Component Value Date/Time    ALKPHOSPHAT 61 06/20/2020 06:23 AM    ASTSGOT 21 06/20/2020 06:23 AM    ALTSGPT 6 06/20/2020 06:23 AM    TBILIRUBIN 0.2 06/20/2020 06:23 AM      No results found for: BNPBTYPENAT      Recent Labs     " 20  0623   WBC 9.3   RBC 4.54*   HEMOGLOBIN 14.0   HEMATOCRIT 43.6   MCV 96.0   MCH 30.8   MCHC 32.1*   RDW 48.6   PLATELETCT 217   MPV 9.2       EKG: Sinus rhythm, right bundle branch block, Q waves in 3 and aVF, no ST segment elevation or depression.  Results for orders placed or performed during the hospital encounter of 20   EKG   Result Value Ref Range    Report       [facility] Emergency Dept.    Test Date:  2020  Pt Name:    MAX SALAS               Department: ER  MRN:        [id number]                      Room:       RD 12  Gender:     Male                         Technician: [id number]  :        1950                   Requested By:REX WINSTON  Order #:    [id number]                    Reading MD: REX WINSTON DO    Measurements  Intervals                                Axis  Rate:       56                           P:          54  CO:         152                          QRS:        -72  QRSD:       118                          T:          35  QT:         404  QTc:        390    Interpretive Statements  SINUS BRADYCARDIA  INCOMPLETE RIGHT BUNDLE BRANCH BLOCK  PROBABLE INFERIOR INFARCT, AGE INDETERMINATE  No previous ECG available for comparison  Electronically Signed On 2020 7:59:42 PDT by REX WINSTON DO         IMAGING:   OUTSIDE IMAGES-CT ABDOMEN /PELVIS   Final Result      OUTSIDE IMAGES-DX CHEST   Final Result      EC-ECHOCARDIOGRAM COMPLETE W/O CONT    (Results Pending)         There are no active problems to display for this patient.              ASSESSMENT AND PLAN:   1.  Acute coronary syndrome.  Continue heparin.  Continue aspirin.  Trend troponins.  Check echocardiogram.  Possibly come to catheterization.  2.  Pancreatic cancer DNR/DNI.  Need more information regarding this diagnosis.  3.  Diabetes mellitus continue treatment.  4.  Parkinson's disease.  5.  Start a statin.  6.  We will continue to follow.

## 2020-06-20 NOTE — PROGRESS NOTES
Lab called with critical result of tropin at 261. Critical lab result read back to lab.    notified of critical lab result.

## 2020-06-20 NOTE — PROGRESS NOTES
Per MD request verified that patient is currently taking bactrum he stated this is his fifth week taking it.

## 2020-06-20 NOTE — ASSESSMENT & PLAN NOTE
-History of chronic pancreatitis.  Recent CT abdomen/pelvis from outside negative for pancreatic mass/malignancy.  No formal diagnosis of pancreatic malignancy.  CA-19-9 normal. On enzyme supplementation at home  -Resume enzyme supplements

## 2020-06-20 NOTE — SENIOR ADMIT NOTE
Ricky Spangler is a 69 y.o. man with HTN, DM2, no known prior MI, COPD, Parkinson's, prostate and pancreatic cancer who was transferred from [facility] for NSTEMI characterized by typical chest pain and elevated cardiac enzymes. EKG sinus bradycardia w RBBB, no ST elevation. Trop I 0.24 >0.35 at OSH >Trop T 256 at [facility]. Heparin drip was initiated prior to transfer. He is hemodynamically stable. HEART score 7. Lopez score 2+.     -admit to telemetry  -received full dose ASA at OSH  -high dose statin  -BB, ACEi held at this time for rate, pressure support (pt took his losartan 25 mg this am as well)  -continue antithrombotic therapy  -consult Cardiology for consideration of PCI, pt NPO  -check Lipids  -DNAR/DNI per goals of care discussion in the ED

## 2020-06-20 NOTE — H&P
History & Physical Note    Date of Admission: 6/20/2020  Admission Status: Inpatient  [facility] Team: [facility] IM Blue Team  Attending: Lexa Arnold M.D.   Senior Resident: Dr. Pires  Intern: Dr. Suarez  Contact Number: [phone]    Chief Complaint: Chest pain     History of Present Illness (HPI): Ricky is a 69 y.o. male who presented 6/20/2020 with history of intermittent substernal chest pain for past 1-2 days.  Pt reports having intermittent upper abdominal pain and central chest pain which radiated to right chest and bilateral shoulders. No specific relation to exertion.  Episodes of chest pain associated with shortness of breath, denies dizziness/ palpitations.  Denies nausea/vomiting.  Reports upper abdominal pain, no change in bowel/bladder habits.  No fever/chills.     Past medical history of ?  CA prostate/prostatitis, hypertension, diabetes, Parkinson's disease, chronic pancreatitis/?  CA pancreas, BPH.    Patient was initially seen in [facility], labs were remarkable for troponin I 0.24 > 0.35.  He received loading dose of aspirin and was started on heparin drip and was transferred to [facility].  In ER, he was hemodynamically stable.  EKG showed sinus bradycardia with RBBB.  Initial troponin 267 which trended down to 261.  Patient was admitted for further management of NSTEMI    Review of Systems:   Review of Systems   Constitutional: Negative for chills and fever.   HENT: Negative for congestion.    Eyes: Negative for blurred vision and double vision.   Respiratory: Positive for shortness of breath.         Occasional shortness of breath   Cardiovascular: Positive for chest pain. Negative for palpitations and leg swelling.        History of intermittent substernal chest pain   Gastrointestinal: Positive for abdominal pain. Negative for constipation and diarrhea.        Complaints of upper abdominal pain   Genitourinary: Positive for dysuria. Negative for flank pain, frequency and hematuria.    Musculoskeletal: Negative for joint pain and myalgias.   Neurological: Negative for dizziness, sensory change, focal weakness, seizures and loss of consciousness.   Psychiatric/Behavioral: Negative for depression.       Past Medical History:   Past Medical History was reviewed with patient.  Past medical history of hypertension, diabetes, Parkinson's disease, BPH, history of prostatitis, pancreatitis, ?  Ca pancreas    Past Surgical History: Past Surgical History was not reviewed with patient.   has no past surgical history on file.    Medications: Medications have been reviewed with patient.  Prior to Admission Medications   Prescriptions Last Dose Informant Patient Reported? Taking?   ALPRAZolam (XANAX) 1 MG Tab   Yes Yes   Sig: Take 1 mg by mouth at bedtime as needed for Sleep.   PARoxetine (PAXIL) 30 MG Tab   Yes Yes   Sig: Take 40 mg by mouth every day.   carbidopa-levodopa (SINEMET)  MG Tab   Yes Yes   Sig: Take 1 Tab by mouth 3 times a day.   finasteride (PROSCAR) 5 MG Tab   Yes Yes   Sig: Take 5 mg by mouth every day.   losartan (COZAAR) 25 MG Tab   Yes Yes   Sig: Take 25 mg by mouth every day.   metFORMIN (GLUCOPHAGE) 500 MG Tab   Yes Yes   Sig: Take 500 mg by mouth 2 times a day, with meals.   pancrelipase, Lip-Prot-Amyl, (CREON 6000) 6000 units Cap DR Particles   Yes Yes   Sig: Take 1 Cap by mouth 3 times a day, with meals.   pantoprazole (PROTONIX) 40 MG Tablet Delayed Response   Yes Yes   Sig: Take 40 mg by mouth 2 times a day.   tamsulosin (FLOMAX) 0.4 MG capsule   Yes Yes   Sig: Take 0.4 mg by mouth every day.   valACYclovir (VALTREX) 500 MG Tab   Yes Yes   Sig: Take 1,000 mg by mouth every day.      Facility-Administered Medications: None        Allergies: Allergies have been reviewed with patient.  Allergies   Allergen Reactions   • Tetanus Toxoid Swelling       Family History: History of Parkinson's disease in family [mother, sister, brother], father: cardiac disease, sister: DM,  HTN    Social History:   Tobacco: Quit smoking many years ago  Alcohol: Denies alcohol use  Recreational drugs (illegal and prescription): Denies recreational drug use  Employment: Currently unemployed  Activity Level: Independent for activities of daily living  Living situation: Lives with wife  Recent travel: Denies recent travel  Primary Care Provider: reviewed Dr Marcos Damon [facility]  Other (stressors, spirituality, exposures):  none  Physical Exam:   Vitals:  Temp:  [36.1 °C (97 °F)-36.3 °C (97.3 °F)] 36.3 °C (97.3 °F)  Pulse:  [54-61] 59  Resp:  [16-19] 17  BP: ()/(48-68) 115/59  SpO2:  [92 %-97 %] 92 %    Physical Exam  Constitutional:       General: He is not in acute distress.  HENT:      Head: Normocephalic and atraumatic.      Nose: No rhinorrhea.      Mouth/Throat:      Mouth: Mucous membranes are moist.   Eyes:      Extraocular Movements: Extraocular movements intact.      Pupils: Pupils are equal, round, and reactive to light.   Cardiovascular:      Rate and Rhythm: Regular rhythm. Bradycardia present.      Heart sounds: No murmur.   Pulmonary:      Effort: Pulmonary effort is normal. No respiratory distress.   Abdominal:      General: Abdomen is flat. Bowel sounds are normal.      Palpations: Abdomen is soft.      Tenderness: There is no abdominal tenderness.   Musculoskeletal:         General: No swelling.      Right lower leg: No edema.      Left lower leg: No edema.   Skin:     General: Skin is warm.   Neurological:      Mental Status: He is alert and oriented to person, place, and time. Mental status is at baseline.   Psychiatric:         Mood and Affect: Mood normal.         Labs:   Recent Labs     06/20/20  0623   WBC 9.3   RBC 4.54*   HEMOGLOBIN 14.0   HEMATOCRIT 43.6   MCV 96.0   MCH 30.8   RDW 48.6   PLATELETCT 217   MPV 9.2   NEUTSPOLYS 65.50   LYMPHOCYTES 22.60   MONOCYTES 9.40   EOSINOPHILS 1.10   BASOPHILS 1.00     Recent Labs     06/20/20  0623   SODIUM 131*   POTASSIUM 4.1    CHLORIDE 99   CO2 23   GLUCOSE 122*   BUN 13     Recent Labs     06/20/20  0623   ALBUMIN 3.6   TBILIRUBIN 0.2   ALKPHOSPHAT 61   TOTPROTEIN 6.2   ALTSGPT 6   ASTSGOT 21   CREATININE 0.85        EKG: Per my read, sinus bradycardia, RBBB    Imaging:   OUTSIDE IMAGES-CT ABDOMEN /PELVIS   Final Result      OUTSIDE IMAGES-DX CHEST   Final Result      EC-ECHOCARDIOGRAM COMPLETE W/ CONT    (Results Pending)        Previous Data Review: reviewed    NSTEMI (non-ST elevated myocardial infarction) ([facility])  Assessment & Plan  Patient transferred from outside hospital with complaints of chest pain and elevated troponin.  Started on heparin drip from outside hospital.  Hemodynamically stable on admit.  EKG shows bradycardia and RBBB, no ST segment changes.  Troponin T  267.  HEART score 7, DEJUAN 2+  -Admit under telemetry  -Continue heparin drip  -Trend EKG/troponin  -Started on ASA, high-dose statin  -BB/ACEI with held given low SBP  -Echo  -BNP  -Cardiology consulted, start Plavix if no consideration for PCI  -Follow-up A1c/lipid panel    BPH (benign prostatic hyperplasia)  Assessment & Plan  On finasteride and tamsulosin at home  -Continue home medications    Prostatitis  Assessment & Plan  History of prostatitis, on fifth week of Bactrim at home  -Continue Bactrim    Pancreatitis, chronic ([facility])  Assessment & Plan  History of chronic pancreatitis. ?  CA pancreas, details not available.  On enzyme supplementation at home  -Resume enzyme supplements    Type 2 diabetes mellitus ([facility])  Assessment & Plan  Known history of type 2 diabetes mellitus.  On metformin at home  -Currently n.p.o. for possible PCI  -Resume medication as appropriate    Hypertension  Assessment & Plan  Known history of hypertension.  Low SBP on admit  -Resume medications as appropriate    Parkinson's disease ([facility])  Assessment & Plan  Known history of Parkinson's disease.  On Sinemet at home.  -Continue home meds    COPD (chronic obstructive pulmonary disease)  ([facility])  Assessment & Plan  Known history of COPD, on inhalers at home, ran out of medication recently. Not in exacerbation. CXR from outside grossly unremarkable except for right basilar atelectasis.  -RT protocol, oxygen per protocol  -IS  -Albuterol rescue prn       Please note that this dictation was created using voice recognition software. I have made every reasonable attempt to correct obvious errors, but there may be errors of grammar and possibly content that I did not discover before finalizing the note.

## 2020-06-20 NOTE — ASSESSMENT & PLAN NOTE
-Known history of hypertension.  On losartan at home. Held due to low SBP  -Resume medications when appropriate, per PCP

## 2020-06-21 LAB
ANION GAP SERPL CALC-SCNC: 7 MMOL/L (ref 7–16)
APTT PPP: 121.8 SEC (ref 24.7–36)
APTT PPP: 56.8 SEC (ref 24.7–36)
APTT PPP: 63.8 SEC (ref 24.7–36)
BUN SERPL-MCNC: 11 MG/DL (ref 8–22)
CALCIUM SERPL-MCNC: 8.3 MG/DL (ref 8.5–10.5)
CANCER AG19-9 SERPL-ACNC: 9.9 U/ML (ref 0–35)
CHLORIDE SERPL-SCNC: 106 MMOL/L (ref 96–112)
CO2 SERPL-SCNC: 25 MMOL/L (ref 20–33)
CREAT SERPL-MCNC: 0.85 MG/DL (ref 0.5–1.4)
EKG IMPRESSION: NORMAL
EKG IMPRESSION: NORMAL
ERYTHROCYTE [DISTWIDTH] IN BLOOD BY AUTOMATED COUNT: 48.4 FL (ref 35.9–50)
GLUCOSE SERPL-MCNC: 108 MG/DL (ref 65–99)
HCT VFR BLD AUTO: 43.2 % (ref 42–52)
HGB BLD-MCNC: 14.1 G/DL (ref 14–18)
MAGNESIUM SERPL-MCNC: 2.1 MG/DL (ref 1.5–2.5)
MCH RBC QN AUTO: 31.1 PG (ref 27–33)
MCHC RBC AUTO-ENTMCNC: 32.6 G/DL (ref 33.7–35.3)
MCV RBC AUTO: 95.4 FL (ref 81.4–97.8)
PLATELET # BLD AUTO: 205 K/UL (ref 164–446)
PMV BLD AUTO: 9.1 FL (ref 9–12.9)
POTASSIUM SERPL-SCNC: 4.3 MMOL/L (ref 3.6–5.5)
RBC # BLD AUTO: 4.53 M/UL (ref 4.7–6.1)
SODIUM SERPL-SCNC: 138 MMOL/L (ref 135–145)
TROPONIN T SERPL-MCNC: 455 NG/L (ref 6–19)
TROPONIN T SERPL-MCNC: 553 NG/L (ref 6–19)
WBC # BLD AUTO: 7.1 K/UL (ref 4.8–10.8)

## 2020-06-21 PROCEDURE — 84484 ASSAY OF TROPONIN QUANT: CPT

## 2020-06-21 PROCEDURE — 84153 ASSAY OF PSA TOTAL: CPT

## 2020-06-21 PROCEDURE — 770020 HCHG ROOM/CARE - TELE (206)

## 2020-06-21 PROCEDURE — 83735 ASSAY OF MAGNESIUM: CPT

## 2020-06-21 PROCEDURE — 700102 HCHG RX REV CODE 250 W/ 637 OVERRIDE(OP): Performed by: INTERNAL MEDICINE

## 2020-06-21 PROCEDURE — 86301 IMMUNOASSAY TUMOR CA 19-9: CPT

## 2020-06-21 PROCEDURE — 85730 THROMBOPLASTIN TIME PARTIAL: CPT

## 2020-06-21 PROCEDURE — 36415 COLL VENOUS BLD VENIPUNCTURE: CPT

## 2020-06-21 PROCEDURE — 85027 COMPLETE CBC AUTOMATED: CPT

## 2020-06-21 PROCEDURE — 80048 BASIC METABOLIC PNL TOTAL CA: CPT

## 2020-06-21 PROCEDURE — A9270 NON-COVERED ITEM OR SERVICE: HCPCS | Performed by: STUDENT IN AN ORGANIZED HEALTH CARE EDUCATION/TRAINING PROGRAM

## 2020-06-21 PROCEDURE — 700105 HCHG RX REV CODE 258: Performed by: INTERNAL MEDICINE

## 2020-06-21 PROCEDURE — 700102 HCHG RX REV CODE 250 W/ 637 OVERRIDE(OP): Performed by: STUDENT IN AN ORGANIZED HEALTH CARE EDUCATION/TRAINING PROGRAM

## 2020-06-21 PROCEDURE — 99233 SBSQ HOSP IP/OBS HIGH 50: CPT | Mod: GC | Performed by: INTERNAL MEDICINE

## 2020-06-21 PROCEDURE — 93005 ELECTROCARDIOGRAM TRACING: CPT | Performed by: STUDENT IN AN ORGANIZED HEALTH CARE EDUCATION/TRAINING PROGRAM

## 2020-06-21 PROCEDURE — 93010 ELECTROCARDIOGRAM REPORT: CPT | Mod: 76 | Performed by: INTERNAL MEDICINE

## 2020-06-21 PROCEDURE — 700111 HCHG RX REV CODE 636 W/ 250 OVERRIDE (IP): Performed by: EMERGENCY MEDICINE

## 2020-06-21 PROCEDURE — A9270 NON-COVERED ITEM OR SERVICE: HCPCS | Performed by: INTERNAL MEDICINE

## 2020-06-21 RX ORDER — SODIUM CHLORIDE 9 MG/ML
INJECTION, SOLUTION INTRAVENOUS CONTINUOUS
Status: DISCONTINUED | OUTPATIENT
Start: 2020-06-21 | End: 2020-06-22

## 2020-06-21 RX ADMIN — METOPROLOL TARTRATE 12.5 MG: 25 TABLET, FILM COATED ORAL at 16:58

## 2020-06-21 RX ADMIN — SULFAMETHOXAZOLE AND TRIMETHOPRIM 1 TABLET: 800; 160 TABLET ORAL at 05:36

## 2020-06-21 RX ADMIN — TAMSULOSIN HYDROCHLORIDE 0.4 MG: 0.4 CAPSULE ORAL at 06:00

## 2020-06-21 RX ADMIN — OMEPRAZOLE 20 MG: 20 CAPSULE, DELAYED RELEASE ORAL at 05:36

## 2020-06-21 RX ADMIN — METOPROLOL TARTRATE 12.5 MG: 25 TABLET, FILM COATED ORAL at 09:41

## 2020-06-21 RX ADMIN — CARBIDOPA AND LEVODOPA 1 TABLET: 25; 100 TABLET ORAL at 08:10

## 2020-06-21 RX ADMIN — CARBIDOPA AND LEVODOPA 1 TABLET: 25; 100 TABLET ORAL at 11:33

## 2020-06-21 RX ADMIN — ASPIRIN 81 MG: 81 TABLET, COATED ORAL at 05:36

## 2020-06-21 RX ADMIN — SULFAMETHOXAZOLE AND TRIMETHOPRIM 1 TABLET: 800; 160 TABLET ORAL at 16:58

## 2020-06-21 RX ADMIN — PANCRELIPASE 6000 UNITS: 30000; 6000; 19000 CAPSULE, DELAYED RELEASE PELLETS ORAL at 11:33

## 2020-06-21 RX ADMIN — CARBIDOPA AND LEVODOPA 1 TABLET: 25; 100 TABLET ORAL at 16:58

## 2020-06-21 RX ADMIN — VALACYCLOVIR HYDROCHLORIDE 1000 MG: 500 TABLET, FILM COATED ORAL at 05:36

## 2020-06-21 RX ADMIN — PANCRELIPASE 6000 UNITS: 30000; 6000; 19000 CAPSULE, DELAYED RELEASE PELLETS ORAL at 08:10

## 2020-06-21 RX ADMIN — PANCRELIPASE 6000 UNITS: 30000; 6000; 19000 CAPSULE, DELAYED RELEASE PELLETS ORAL at 16:58

## 2020-06-21 RX ADMIN — ATORVASTATIN CALCIUM 80 MG: 80 TABLET, FILM COATED ORAL at 16:58

## 2020-06-21 RX ADMIN — SODIUM CHLORIDE: 9 INJECTION, SOLUTION INTRAVENOUS at 20:22

## 2020-06-21 RX ADMIN — FINASTERIDE 5 MG: 5 TABLET, FILM COATED ORAL at 05:36

## 2020-06-21 RX ADMIN — HEPARIN SODIUM 1000 UNITS/HR: 5000 INJECTION, SOLUTION INTRAVENOUS at 07:09

## 2020-06-21 RX ADMIN — PAROXETINE HYDROCHLORIDE 40 MG: 20 TABLET, FILM COATED ORAL at 05:36

## 2020-06-21 ASSESSMENT — ENCOUNTER SYMPTOMS
FEVER: 0
DOUBLE VISION: 0
HEADACHES: 0
FOCAL WEAKNESS: 0
ORTHOPNEA: 0
VOMITING: 0
NAUSEA: 0
CHILLS: 0
WHEEZING: 0
COUGH: 0
BACK PAIN: 0
SHORTNESS OF BREATH: 0
ABDOMINAL PAIN: 0
DIARRHEA: 0
DIZZINESS: 0
PALPITATIONS: 0
DEPRESSION: 0

## 2020-06-21 ASSESSMENT — FIBROSIS 4 INDEX: FIB4 SCORE: 2.89

## 2020-06-21 NOTE — PROGRESS NOTES
"CARDIOLOGY FOLLOW UP    Impressions:  #.  Acute non-STEMI  -Normal left ventricular ejection fraction  - Currently pain-free  - Inferior MI on EKG  - Poor quality echocardiogram despite use of contrast  #.  Diabetes  #.  Patient concern of possible prostate and pancreatic cancer-awaiting outpatient consultation, records not available.  Has BPH and pancreatic insufficiency   #.  Parkinson's disease  #.  Good functional capacity    Recommendations:  Cath/possible PCI tomorrow-he will rescind his DNR status for procedure    Add low-dose metoprolol    Continue aspirin and atorvastatin    Will follow    SUBJECTIVE/INTERIM EVENTS:  No further angina overnight.  He describes being told that he is on his way to having prostate and pancreas cancer.  He is supposed to see an outpatient provider.  He describes a cystoscopy as part of his evaluation but does not believe he has ever had or been scheduled to undergo biopsy.  He just retired in June.    EXAM:  /69   Pulse 74   Temp 36.6 °C (97.8 °F) (Temporal)   Resp 19   Ht 1.702 m (5' 7\")   Wt 81.5 kg (179 lb 10.8 oz)   SpO2 91%   BMI 28.14 kg/m²   Gen: Well appearing  HEENT: Symmetric face. Anicteric sclerae. Moist mucus membranes  NECK: No JVD. No lymphadenopathy  CARDIAC: Normal PMI, regular, normal S1, S2.  VASCULATURE: Normal carotid amplitude without bruit. Peripheral pulses normal  RESP: Clear to auscultation bilaterally  ABD: Soft, non-tender, non-distended  EXT: No edema, no clubbing or cyanosis  SKIN: Warm and dry  NEURO: No gross deficits  PSYCH: Appropriate affect, participates in conversation    Studies  Lab Results   Component Value Date/Time    SODIUM 138 06/21/2020 12:10 AM    POTASSIUM 4.3 06/21/2020 12:10 AM    CHLORIDE 106 06/21/2020 12:10 AM    CO2 25 06/21/2020 12:10 AM    GLUCOSE 108 (H) 06/21/2020 12:10 AM    BUN 11 06/21/2020 12:10 AM    CREATININE 0.85 06/21/2020 12:10 AM       For this encounter I directly reviewed ECG tracings and medical " records      Current Facility-Administered Medications:   •  metoprolol (LOPRESSOR) tablet 12.5 mg, 12.5 mg, Oral, TWICE DAILY, Esau Salmon M.D.  •  heparin injection 2,600 Units, 2,600 Units, Intravenous, PRN **AND** heparin infusion 25,000 units in 500 mL 0.45% NACL, , Intravenous, Continuous, Last Rate: 20 mL/hr at 06/21/20 0709, 1,000 Units/hr at 06/21/20 0709 **AND** Protocol 440 Heparin Weight Based DO NOT GIVE ANY HEPARIN BOLUS TO STROKE PATIENT, , , CONTINUOUS **AND** Protocol 440 Heparin Weight Based Discontinue Enoxaparin (Lovenox), Dabigatran (Pradaxa), Rivaroxaban (Xarelto), Apixaban (Eliquis), Edoxaban (Savaysa, Lixiana), Fondaparinux (Arixtra) and Argatroban prior to heparin administration, , , Once **AND** Protocol 440 Heparin Weight Based Draw baseline aPTT, PT, and platelet count if not already done, , , CONTINUOUS **AND** Protocol 440 Heparin Weight Based Draw aPTT 6 hours after beginning infusion. , , , CONTINUOUS **AND** Protocol 440 Heparin Weight Based Record Patient Data, , , CONTINUOUS **AND** Protocol 440 Heparin Weight Based INSTRUCTIONS, , , CONTINUOUS **AND** Protocol 440 Heparin Weight Based Review aPTT results 6 hours after infusion is begun as detailed, , , CONTINUOUS **AND** Protocol 440 Heparin Weight Based Draw Platelet count every three days. Contact MD if platelet is 50% lower than baseline count., , , CONTINUOUS **AND** Protocol 440 Heparin Weight Based Adjust heparin to maintain aPTT between 55-96 sec, , , CONTINUOUS **AND** Protocol 440 Heparin Weight Based Order aPTT 6 hours after any rate change or hold until aPTT is therapeutic (55-96 seconds), , , CONTINUOUS **AND** Protocol 440 Heparin Weight Based Documentation and verification, , , CONTINUOUS, Kendra Marcum D.O.  •  senna-docusate (PERICOLACE or SENOKOT S) 8.6-50 MG per tablet 2 Tab, 2 Tab, Oral, BID **AND** polyethylene glycol/lytes (MIRALAX) PACKET 1 Packet, 1 Packet, Oral, QDAY PRN **AND** magnesium hydroxide  (MILK OF MAGNESIA) suspension 30 mL, 30 mL, Oral, QDAY PRN **AND** bisacodyl (DULCOLAX) suppository 10 mg, 10 mg, Rectal, QDAY PRN, Jia Suarez M.D.  •  aspirin EC (ECOTRIN) tablet 81 mg, 81 mg, Oral, DAILY, Jia Suarez M.D., 81 mg at 06/21/20 0536  •  atorvastatin (LIPITOR) tablet 80 mg, 80 mg, Oral, Q EVENING, Jia Suarez M.D., 80 mg at 06/20/20 1742  •  omeprazole (PRILOSEC) capsule 20 mg, 20 mg, Oral, DAILY, Jia Suarez M.D., 20 mg at 06/21/20 0536  •  carbidopa-levodopa (SINEMET)  MG tablet 1 Tab, 1 Tab, Oral, TID, Jia Suarez M.D., 1 Tab at 06/21/20 0810  •  finasteride (PROSCAR) tablet 5 mg, 5 mg, Oral, DAILY, Jia Suarez M.D., 5 mg at 06/21/20 0536  •  pancrelipase (Lip-Prot-Amyl) (CREON 6000) 6000 units capsule 6,000 Units, 1 Cap, Oral, TID WITH MEALS, Jia Suarez M.D., 6,000 Units at 06/21/20 0810  •  PARoxetine (PAXIL) tablet 40 mg, 40 mg, Oral, DAILY, Jia Suarez M.D., 40 mg at 06/21/20 0536  •  tamsulosin (FLOMAX) capsule 0.4 mg, 0.4 mg, Oral, DAILY, Jia Suarez M.D., 0.4 mg at 06/21/20 0600  •  valACYclovir (VALTREX) caplet 1,000 mg, 1,000 mg, Oral, DAILY, Jia Suarez M.D., 1,000 mg at 06/21/20 0536  •  sulfamethoxazole-trimethoprim (BACTRIM DS) 800-160 MG tablet 1 Tab, 1 Tab, Oral, Q12HRS, Jia Suarez M.D., 1 Tab at 06/21/20 0536  •  albuterol inhaler 2 Puff, 2 Puff, Inhalation, Q4H PRN (RT), Jia Suarez M.D.

## 2020-06-21 NOTE — PROGRESS NOTES
Daily Progress Note:     Date of Service: 6/21/2020  Primary Team: [facility] Blue Team   Attending: Lexa Arnold M.D.   Senior Resident: Dr. Suarez  Intern: Dr. Suarez  Contact:  [phone]    Chief Complaint:   Chest pain    ID: 69-year-old male transferred from outside hospital for further management of NSTEMI.  Receiving heparin drip.  Possible PCI tomorrow    Subjective:   -No acute events overnight, remains symptom-free.    -Denies chest pain/shortness of breath/orthopnea.  No leg swelling  -No fever/chills  -Seen by cardiology : Possible cath/PCI tomorrow    Had unclear documentation of CA pancreas/CA prostate on admit, on further chart review no formal diagnosis seen. CT abdomen/pelvis done outside June 2020: Negative for pancreatic mass/malignancy/ Ca prostate.  CA-19-9 normal.   Consultants/Specialty:  Cardiology    Review of Systems:    Review of Systems   Constitutional: Negative for chills and fever.   HENT: Negative for ear pain.    Eyes: Negative for double vision.   Respiratory: Negative for cough, shortness of breath and wheezing.    Cardiovascular: Negative for chest pain, palpitations, orthopnea and leg swelling.   Gastrointestinal: Negative for abdominal pain, diarrhea, nausea and vomiting.   Genitourinary: Positive for dysuria. Negative for frequency and hematuria.   Musculoskeletal: Negative for back pain.   Neurological: Negative for dizziness, focal weakness and headaches.   Psychiatric/Behavioral: Negative for depression.       Objective Data:   Physical Exam:   Vitals:   Temp:  [36.2 °C (97.2 °F)-36.6 °C (97.8 °F)] 36.2 °C (97.2 °F)  Pulse:  [59-74] 69  Resp:  [17-19] 19  BP: (103-130)/(52-69) 108/57  SpO2:  [91 %-92 %] 92 %    Physical Exam  Constitutional:       General: He is not in acute distress.  HENT:      Head: Normocephalic.      Nose: No rhinorrhea.      Mouth/Throat:      Mouth: Mucous membranes are moist.   Eyes:      Extraocular Movements: Extraocular movements intact.    Cardiovascular:      Rate and Rhythm: Normal rate.      Pulses: Normal pulses.      Heart sounds: No murmur.   Pulmonary:      Effort: Pulmonary effort is normal. No respiratory distress.      Breath sounds: No wheezing.   Abdominal:      General: Abdomen is flat. There is no distension.      Palpations: Abdomen is soft.      Tenderness: There is no abdominal tenderness.   Musculoskeletal:      Right lower leg: No edema.      Left lower leg: No edema.   Skin:     General: Skin is warm.   Neurological:      Mental Status: He is alert and oriented to person, place, and time. Mental status is at baseline.   Psychiatric:         Mood and Affect: Mood normal.         Quality Measures  Quality-Core Measures   Reviewed items::  EKG reviewed, Labs reviewed and Medications reviewed  DVT prophylaxis pharmacological::  Heparin  Ulcer Prophylaxis::  Yes      Labs:   Recent Labs     06/20/20  0623 06/21/20  0010   WBC 9.3 7.1   RBC 4.54* 4.53*   HEMOGLOBIN 14.0 14.1   HEMATOCRIT 43.6 43.2   MCV 96.0 95.4   MCH 30.8 31.1   RDW 48.6 48.4   PLATELETCT 217 205   MPV 9.2 9.1   NEUTSPOLYS 65.50  --    LYMPHOCYTES 22.60  --    MONOCYTES 9.40  --    EOSINOPHILS 1.10  --    BASOPHILS 1.00  --       Recent Labs     06/20/20  0623 06/21/20  0010   SODIUM 131* 138   POTASSIUM 4.1 4.3   CHLORIDE 99 106   CO2 23 25   GLUCOSE 122* 108*   BUN 13 11      Recent Labs     06/20/20 0623 06/21/20  0010   ALBUMIN 3.6  --    TBILIRUBIN 0.2  --    ALKPHOSPHAT 61  --    TOTPROTEIN 6.2  --    ALTSGPT 6  --    ASTSGOT 21  --    CREATININE 0.85 0.85      Lab Results   Component Value Date/Time    PROTHROMBTM 13.5 06/20/2020 06:23 AM    INR 1.00 06/20/2020 06:23 AM         Imaging:   EC-ECHOCARDIOGRAM COMPLETE W/ CONT   Final Result      OUTSIDE IMAGES-CT ABDOMEN /PELVIS   Final Result      OUTSIDE IMAGES-DX CHEST   Final Result           NSTEMI (non-ST elevated myocardial infarction) (HCC)  Assessment & Plan  Patient transferred from outside hospital  with complaints of chest pain and elevated troponin.  Started on heparin drip from outside hospital.  Hemodynamically stable on admit.  EKG shows bradycardia and RBBB, no ST segment changes.  Troponin T trended up to 500s.  Repeat EKG suggestive of inferior wall MI.  HEART score 7, DEJUAN 2+. -Echo: Poor study, EF 55%, no regional wall motion noted.  proBNP elevated 1200s.  HbA1c 6.5, lipid panel normal  -Telemetry monitoring  -Continue heparin drip  -Continue ASA, statin  -Started on low-dose metoprolol  -Cardiology : Possible cath/PCI tomorrow      BPH (benign prostatic hyperplasia)  Assessment & Plan  On finasteride and tamsulosin at home  -Continue home medications    Prostatitis  Assessment & Plan  History of prostatitis, on fifth week of Bactrim at home.   -Continue Bactrim    Pancreatitis, chronic ([facility])  Assessment & Plan  History of chronic pancreatitis.  Recent CT abdomen/pelvis from outside negative for pancreatic mass/malignancy.  No formal diagnosis of pancreatic malignancy.  CA-19-9 normal. On enzyme supplementation at home  -Resume enzyme supplements    Type 2 diabetes mellitus ([facility])  Assessment & Plan  Known history of type 2 diabetes mellitus.  On metformin at home.  HbA1c 6.5   -Resume medication after PCI      Hypertension  Assessment & Plan  Known history of hypertension.  On losartan at home. low SBP on admit  -Resume medications as appropriate    Parkinson's disease ([facility])  Assessment & Plan  Known history of Parkinson's disease.  On Sinemet at home.  -Continue home meds    COPD (chronic obstructive pulmonary disease) ([facility])  Assessment & Plan  Known history of COPD, on inhalers at home, ran out of medication recently. Not in exacerbation. CXR from outside grossly unremarkable except for right basilar atelectasis.  -RT protocol, oxygen per protocol  -IS        Please note that this dictation was created using voice recognition software. I have made every reasonable attempt to correct obvious errors,  but there may be errors of grammar and possibly content that I did not discover before finalizing the note.

## 2020-06-21 NOTE — CARE PLAN
Problem: Safety  Goal: Will remain free from falls  Outcome: PROGRESSING AS EXPECTED  Intervention: Implement fall precautions  Flowsheets  Taken 6/21/2020 0816  Bed Alarm: Yes - Alarm On  IV Pole on Same Side of Bed as Bathroom: Yes  Bedrails: Bedrails Closest to Bathroom Down  Chair/Bed Strip Alarm: Yes - Alarm On  Taken 6/21/2020 0800  Environmental Precautions:   Treaded Slipper Socks on Patient   Personal Belongings, Wastebasket, Call Bell etc. in Easy Reach   Report Given to Other Health Care Providers Regarding Fall Risk   Bed in Low Position   Communication Sign for Patients & Families   Mobility Assessed & Appropriate Sign Placed  Note: Pt uses call light appropriately. Up SBA.      Problem: Knowledge Deficit  Goal: Knowledge of disease process/condition, treatment plan, diagnostic tests, and medications will improve  Outcome: PROGRESSING AS EXPECTED  Intervention: Explain information regarding disease process/condition, treatment plan, diagnostic tests, and medications and document in education  Note: Plan of care explained to patient. MD at bedside. Patient verbalized understanding and has no further questions.

## 2020-06-21 NOTE — PROGRESS NOTES
Assumed care this am from night shift RN. Patient awake and in bed, no complaints of pain or signs of distress. Patient AxO 4, O2 @ room air, VSS. Call bell and personal items within reach, bed locked in low position. Bed exit alarm on and plugged in. Hourly rounding in place. Tele box in place, monitor room notified.

## 2020-06-21 NOTE — PROGRESS NOTES
Family at bedside requesting update from physician on plan of care. Patient daughter here from out of town and was asking physician to contact either in person or via phone to discuss plan of care.

## 2020-06-22 ENCOUNTER — APPOINTMENT (OUTPATIENT)
Dept: CARDIOLOGY | Facility: MEDICAL CENTER | Age: 70
DRG: 233 | End: 2020-06-22
Attending: INTERNAL MEDICINE
Payer: MEDICARE

## 2020-06-22 LAB
ALBUMIN SERPL BCP-MCNC: 3.6 G/DL (ref 3.2–4.9)
ALBUMIN/GLOB SERPL: 1.4 G/DL
ALP SERPL-CCNC: 63 U/L (ref 30–99)
ALT SERPL-CCNC: 10 U/L (ref 2–50)
ANION GAP SERPL CALC-SCNC: 10 MMOL/L (ref 7–16)
APTT PPP: 57.1 SEC (ref 24.7–36)
AST SERPL-CCNC: 18 U/L (ref 12–45)
BILIRUB SERPL-MCNC: 0.4 MG/DL (ref 0.1–1.5)
BUN SERPL-MCNC: 14 MG/DL (ref 8–22)
CALCIUM SERPL-MCNC: 8.9 MG/DL (ref 8.5–10.5)
CHLORIDE SERPL-SCNC: 102 MMOL/L (ref 96–112)
CO2 SERPL-SCNC: 22 MMOL/L (ref 20–33)
CREAT SERPL-MCNC: 0.98 MG/DL (ref 0.5–1.4)
ERYTHROCYTE [DISTWIDTH] IN BLOOD BY AUTOMATED COUNT: 47.8 FL (ref 35.9–50)
GLOBULIN SER CALC-MCNC: 2.6 G/DL (ref 1.9–3.5)
GLUCOSE SERPL-MCNC: 108 MG/DL (ref 65–99)
HCT VFR BLD AUTO: 44 % (ref 42–52)
HGB BLD-MCNC: 14.3 G/DL (ref 14–18)
INR PPP: 0.99 (ref 0.87–1.13)
MAGNESIUM SERPL-MCNC: 2.1 MG/DL (ref 1.5–2.5)
MCH RBC QN AUTO: 31 PG (ref 27–33)
MCHC RBC AUTO-ENTMCNC: 32.5 G/DL (ref 33.7–35.3)
MCV RBC AUTO: 95.4 FL (ref 81.4–97.8)
PLATELET # BLD AUTO: 205 K/UL (ref 164–446)
PMV BLD AUTO: 9.5 FL (ref 9–12.9)
POTASSIUM SERPL-SCNC: 4.4 MMOL/L (ref 3.6–5.5)
PROT SERPL-MCNC: 6.2 G/DL (ref 6–8.2)
PROTHROMBIN TIME: 13.4 SEC (ref 12–14.6)
PSA FREE MFR SERPL: NORMAL %
PSA FREE MFR SERPL: NORMAL %
PSA FREE SERPL-MCNC: NORMAL NG/ML
PSA FREE SERPL-MCNC: NORMAL NG/ML
PSA SERPL-MCNC: 2.1 NG/ML (ref 0–4)
PSA SERPL-MCNC: 2.2 NG/ML (ref 0–4)
RBC # BLD AUTO: 4.61 M/UL (ref 4.7–6.1)
SODIUM SERPL-SCNC: 134 MMOL/L (ref 135–145)
WBC # BLD AUTO: 7.1 K/UL (ref 4.8–10.8)

## 2020-06-22 PROCEDURE — 700111 HCHG RX REV CODE 636 W/ 250 OVERRIDE (IP): Performed by: EMERGENCY MEDICINE

## 2020-06-22 PROCEDURE — 85610 PROTHROMBIN TIME: CPT

## 2020-06-22 PROCEDURE — 99233 SBSQ HOSP IP/OBS HIGH 50: CPT | Mod: GC | Performed by: INTERNAL MEDICINE

## 2020-06-22 PROCEDURE — 700102 HCHG RX REV CODE 250 W/ 637 OVERRIDE(OP): Performed by: INTERNAL MEDICINE

## 2020-06-22 PROCEDURE — 700105 HCHG RX REV CODE 258: Performed by: INTERNAL MEDICINE

## 2020-06-22 PROCEDURE — 85730 THROMBOPLASTIN TIME PARTIAL: CPT

## 2020-06-22 PROCEDURE — 700117 HCHG RX CONTRAST REV CODE 255: Performed by: INTERNAL MEDICINE

## 2020-06-22 PROCEDURE — 700111 HCHG RX REV CODE 636 W/ 250 OVERRIDE (IP)

## 2020-06-22 PROCEDURE — A9270 NON-COVERED ITEM OR SERVICE: HCPCS | Performed by: INTERNAL MEDICINE

## 2020-06-22 PROCEDURE — 4A023N7 MEASUREMENT OF CARDIAC SAMPLING AND PRESSURE, LEFT HEART, PERCUTANEOUS APPROACH: ICD-10-PCS | Performed by: INTERNAL MEDICINE

## 2020-06-22 PROCEDURE — 770020 HCHG ROOM/CARE - TELE (206)

## 2020-06-22 PROCEDURE — A9270 NON-COVERED ITEM OR SERVICE: HCPCS | Performed by: STUDENT IN AN ORGANIZED HEALTH CARE EDUCATION/TRAINING PROGRAM

## 2020-06-22 PROCEDURE — B2111ZZ FLUOROSCOPY OF MULTIPLE CORONARY ARTERIES USING LOW OSMOLAR CONTRAST: ICD-10-PCS | Performed by: INTERNAL MEDICINE

## 2020-06-22 PROCEDURE — 99153 MOD SED SAME PHYS/QHP EA: CPT

## 2020-06-22 PROCEDURE — 94760 N-INVAS EAR/PLS OXIMETRY 1: CPT

## 2020-06-22 PROCEDURE — 85027 COMPLETE CBC AUTOMATED: CPT

## 2020-06-22 PROCEDURE — 36415 COLL VENOUS BLD VENIPUNCTURE: CPT

## 2020-06-22 PROCEDURE — 83735 ASSAY OF MAGNESIUM: CPT

## 2020-06-22 PROCEDURE — 700102 HCHG RX REV CODE 250 W/ 637 OVERRIDE(OP): Performed by: STUDENT IN AN ORGANIZED HEALTH CARE EDUCATION/TRAINING PROGRAM

## 2020-06-22 PROCEDURE — 700101 HCHG RX REV CODE 250

## 2020-06-22 PROCEDURE — 80053 COMPREHEN METABOLIC PANEL: CPT

## 2020-06-22 RX ORDER — ALPRAZOLAM 1 MG/1
1 TABLET ORAL ONCE
Status: COMPLETED | OUTPATIENT
Start: 2020-06-22 | End: 2020-06-22

## 2020-06-22 RX ORDER — SODIUM CHLORIDE 9 MG/ML
INJECTION, SOLUTION INTRAVENOUS CONTINUOUS
Status: ACTIVE | OUTPATIENT
Start: 2020-06-22 | End: 2020-06-22

## 2020-06-22 RX ORDER — MIDAZOLAM HYDROCHLORIDE 1 MG/ML
INJECTION INTRAMUSCULAR; INTRAVENOUS
Status: COMPLETED
Start: 2020-06-22 | End: 2020-06-22

## 2020-06-22 RX ORDER — HEPARIN SODIUM,PORCINE 1000/ML
VIAL (ML) INJECTION
Status: COMPLETED
Start: 2020-06-22 | End: 2020-06-22

## 2020-06-22 RX ORDER — LIDOCAINE HYDROCHLORIDE 20 MG/ML
INJECTION, SOLUTION INFILTRATION; PERINEURAL
Status: COMPLETED
Start: 2020-06-22 | End: 2020-06-22

## 2020-06-22 RX ORDER — HYDROXYZINE HYDROCHLORIDE 10 MG/1
10 TABLET, FILM COATED ORAL ONCE
Status: COMPLETED | OUTPATIENT
Start: 2020-06-22 | End: 2020-06-22

## 2020-06-22 RX ORDER — HEPARIN SODIUM 200 [USP'U]/100ML
INJECTION, SOLUTION INTRAVENOUS
Status: COMPLETED
Start: 2020-06-22 | End: 2020-06-22

## 2020-06-22 RX ORDER — VERAPAMIL HYDROCHLORIDE 2.5 MG/ML
INJECTION, SOLUTION INTRAVENOUS
Status: COMPLETED
Start: 2020-06-22 | End: 2020-06-22

## 2020-06-22 RX ADMIN — SULFAMETHOXAZOLE AND TRIMETHOPRIM 1 TABLET: 800; 160 TABLET ORAL at 17:35

## 2020-06-22 RX ADMIN — FINASTERIDE 5 MG: 5 TABLET, FILM COATED ORAL at 05:07

## 2020-06-22 RX ADMIN — HEPARIN SODIUM: 1000 INJECTION, SOLUTION INTRAVENOUS; SUBCUTANEOUS at 15:58

## 2020-06-22 RX ADMIN — CARBIDOPA AND LEVODOPA 1 TABLET: 25; 100 TABLET ORAL at 05:08

## 2020-06-22 RX ADMIN — HEPARIN SODIUM 2000 UNITS: 200 INJECTION, SOLUTION INTRAVENOUS at 15:58

## 2020-06-22 RX ADMIN — OMEPRAZOLE 20 MG: 20 CAPSULE, DELAYED RELEASE ORAL at 05:07

## 2020-06-22 RX ADMIN — HEPARIN SODIUM 2000 UNITS: 1000 INJECTION, SOLUTION INTRAVENOUS; SUBCUTANEOUS at 16:06

## 2020-06-22 RX ADMIN — SODIUM CHLORIDE: 9 INJECTION, SOLUTION INTRAVENOUS at 12:46

## 2020-06-22 RX ADMIN — TAMSULOSIN HYDROCHLORIDE 0.4 MG: 0.4 CAPSULE ORAL at 05:07

## 2020-06-22 RX ADMIN — HEPARIN SODIUM 1000 UNITS/HR: 5000 INJECTION, SOLUTION INTRAVENOUS at 08:55

## 2020-06-22 RX ADMIN — VERAPAMIL HYDROCHLORIDE 5 MG: 5 INJECTION INTRAVENOUS at 15:59

## 2020-06-22 RX ADMIN — METOPROLOL TARTRATE 12.5 MG: 25 TABLET, FILM COATED ORAL at 05:07

## 2020-06-22 RX ADMIN — HYDROXYZINE HYDROCHLORIDE 10 MG: 10 TABLET, FILM COATED ORAL at 09:56

## 2020-06-22 RX ADMIN — ATORVASTATIN CALCIUM 80 MG: 80 TABLET, FILM COATED ORAL at 17:35

## 2020-06-22 RX ADMIN — MIDAZOLAM HYDROCHLORIDE 1.5 MG: 1 INJECTION, SOLUTION INTRAMUSCULAR; INTRAVENOUS at 16:08

## 2020-06-22 RX ADMIN — LIDOCAINE HYDROCHLORIDE: 20 INJECTION, SOLUTION INFILTRATION; PERINEURAL at 15:59

## 2020-06-22 RX ADMIN — NITROGLYCERIN 10 ML: 20 INJECTION INTRAVENOUS at 15:59

## 2020-06-22 RX ADMIN — PAROXETINE HYDROCHLORIDE 40 MG: 20 TABLET, FILM COATED ORAL at 05:07

## 2020-06-22 RX ADMIN — PANCRELIPASE 6000 UNITS: 30000; 6000; 19000 CAPSULE, DELAYED RELEASE PELLETS ORAL at 17:36

## 2020-06-22 RX ADMIN — SULFAMETHOXAZOLE AND TRIMETHOPRIM 1 TABLET: 800; 160 TABLET ORAL at 05:08

## 2020-06-22 RX ADMIN — FENTANYL CITRATE 75 MCG: 50 INJECTION INTRAMUSCULAR; INTRAVENOUS at 16:08

## 2020-06-22 RX ADMIN — VALACYCLOVIR HYDROCHLORIDE 1000 MG: 500 TABLET, FILM COATED ORAL at 05:08

## 2020-06-22 RX ADMIN — ASPIRIN 81 MG: 81 TABLET, COATED ORAL at 05:07

## 2020-06-22 RX ADMIN — IOHEXOL 57 ML: 350 INJECTION, SOLUTION INTRAVENOUS at 16:09

## 2020-06-22 RX ADMIN — ALPRAZOLAM 1 MG: 1 TABLET ORAL at 21:19

## 2020-06-22 RX ADMIN — CARBIDOPA AND LEVODOPA 1 TABLET: 25; 100 TABLET ORAL at 17:35

## 2020-06-22 RX ADMIN — METOPROLOL TARTRATE 12.5 MG: 25 TABLET, FILM COATED ORAL at 17:35

## 2020-06-22 ASSESSMENT — ENCOUNTER SYMPTOMS
INSOMNIA: 1
NAUSEA: 0
ORTHOPNEA: 0
WHEEZING: 0
SINUS PAIN: 0
HEADACHES: 0
DOUBLE VISION: 0
DIZZINESS: 0
ABDOMINAL PAIN: 0
SORE THROAT: 0
DIARRHEA: 0
VOMITING: 0
BACK PAIN: 0
BLURRED VISION: 0
FEVER: 0
PALPITATIONS: 0
DEPRESSION: 0
CHILLS: 0
COUGH: 0
WEAKNESS: 0
SHORTNESS OF BREATH: 0
FOCAL WEAKNESS: 0

## 2020-06-22 NOTE — PROGRESS NOTES
Assumed care of pt. Bedside report completed with night shift RN. Pt alert and oriented. Pt denies pain for this RN. Pt resting comfortably in bed. Heparin drip verified with night shift RN. Call light within reach. Bed low and locked. Offered needs.

## 2020-06-22 NOTE — RESPIRATORY CARE
Oxygen Rounds      Patient found on    O2 L/m:  ___2______    Oxygen device:  ___nc_____   Spo2: _____91____%      Respiratory device skin site inspection completed.

## 2020-06-22 NOTE — NON-PROVIDER
Daily Progress Note:     Date of Service: 6/22/2020  Primary Team: [facility] Blue Team and [facility] Gray Team   Attending: Lexa Arnold M.D.   Senior Resident: Dr. Campbell   Intern: Dr. Camarena  Contact:  [phone]    Chief Complaint:   Chest pain    Subjective: 69-year-old male transferred from outside hospital for further management of NSTEMI.  Receiving heparin drip.      Possible PCI today. Patient is feeling anxious about this and had difficulty sleeping last night. No acute events overnight. He has had a couple of episodes of urinary urgency and incontinence. His bilateral shoulder are painful but he feels like this is due to his bilateral rotator cuff injuries. No chest pain, dyspnea, nausea, vomting, weakness, fevers, or chills.     Patient reports that he has never been diagnosed with pancreatic cancer. He was having a workup done for this but testing was all negative. He reports that his prostate has been an issue with dysuria, frequency, and urgency for many months since a cystoscopy in [place]. Symptoms have improved since on Bactrim. He is currently waiting on further outpatient consultation for prostate cancer.     Consultants/Specialty:  Cardiology    Review of Systems:    Review of Systems   Constitutional: Negative for chills and fever.   HENT: Negative for sinus pain and sore throat.    Eyes: Negative for blurred vision.   Respiratory: Negative for cough and shortness of breath.    Cardiovascular: Negative for chest pain, orthopnea and leg swelling.   Gastrointestinal: Negative for abdominal pain, diarrhea, nausea and vomiting.   Genitourinary: Positive for urgency. Negative for dysuria.   Musculoskeletal: Positive for joint pain.   Neurological: Negative for weakness and headaches.   Psychiatric/Behavioral: The patient has insomnia.        Objective Data:   Physical Exam:   Vitals:   Temp:  [36.2 °C (97.2 °F)-36.8 °C (98.3 °F)] 36.8 °C (98.2 °F)  Pulse:  [54-74] 58  Resp:  [16-19] 16  BP:  (108-130)/(55-69) 113/60  SpO2:  [90 %-93 %] 90 %     Physical Exam  Constitutional:       General: He is not in acute distress.  HENT:      Head: Normocephalic and atraumatic.   Eyes:      Extraocular Movements: Extraocular movements intact.      Conjunctiva/sclera: Conjunctivae normal.   Neck:      Musculoskeletal: Normal range of motion.   Cardiovascular:      Rate and Rhythm: Normal rate and regular rhythm.      Pulses: Normal pulses.   Pulmonary:      Effort: Pulmonary effort is normal. No respiratory distress.   Abdominal:      General: Abdomen is flat.      Palpations: Abdomen is soft.      Tenderness: There is no abdominal tenderness.   Musculoskeletal: Normal range of motion.         General: No swelling.   Skin:     General: Skin is warm and dry.      Capillary Refill: Capillary refill takes less than 2 seconds.   Neurological:      General: No focal deficit present.      Mental Status: He is alert and oriented to person, place, and time.   Psychiatric:         Mood and Affect: Mood normal.         Behavior: Behavior normal.           Labs:   Results for MAX SALAS (MRN [id number]) as of 6/22/2020 06:39   Ref. Range 6/22/2020 03:17   WBC Latest Ref Range: 4.8 - 10.8 K/uL 7.1   RBC Latest Ref Range: 4.70 - 6.10 M/uL 4.61 (L)   Hemoglobin Latest Ref Range: 14.0 - 18.0 g/dL 14.3   Hematocrit Latest Ref Range: 42.0 - 52.0 % 44.0   MCV Latest Ref Range: 81.4 - 97.8 fL 95.4   MCH Latest Ref Range: 27.0 - 33.0 pg 31.0   MCHC Latest Ref Range: 33.7 - 35.3 g/dL 32.5 (L)   RDW Latest Ref Range: 35.9 - 50.0 fL 47.8   Platelet Count Latest Ref Range: 164 - 446 K/uL 205   MPV Latest Ref Range: 9.0 - 12.9 fL 9.5   Sodium Latest Ref Range: 135 - 145 mmol/L 134 (L)   Potassium Latest Ref Range: 3.6 - 5.5 mmol/L 4.4   Chloride Latest Ref Range: 96 - 112 mmol/L 102   Co2 Latest Ref Range: 20 - 33 mmol/L 22   Anion Gap Latest Ref Range: 7.0 - 16.0  10.0   Glucose Latest Ref Range: 65 - 99 mg/dL 108 (H)   Bun Latest Ref  Range: 8 - 22 mg/dL 14   Creatinine Latest Ref Range: 0.50 - 1.40 mg/dL 0.98   GFR If  Latest Ref Range: >60 mL/min/1.73 m 2 >60   GFR If Non  Latest Ref Range: >60 mL/min/1.73 m 2 >60   Calcium Latest Ref Range: 8.5 - 10.5 mg/dL 8.9   AST(SGOT) Latest Ref Range: 12 - 45 U/L 18   ALT(SGPT) Latest Ref Range: 2 - 50 U/L 10   Alkaline Phosphatase Latest Ref Range: 30 - 99 U/L 63   Total Bilirubin Latest Ref Range: 0.1 - 1.5 mg/dL 0.4   Albumin Latest Ref Range: 3.2 - 4.9 g/dL 3.6   Total Protein Latest Ref Range: 6.0 - 8.2 g/dL 6.2   Globulin Latest Ref Range: 1.9 - 3.5 g/dL 2.6   A-G Ratio Latest Units: g/dL 1.4   Magnesium Latest Ref Range: 1.5 - 2.5 mg/dL 2.1   INR Latest Ref Range: 0.87 - 1.13  0.99   PT Latest Ref Range: 12.0 - 14.6 sec 13.4   APTT Latest Ref Range: 24.7 - 36.0 sec 57.1 (H)       Imaging:   No new imaging    NSTEMI:   2 days of typical chest pain and elevated cardiac enzymes. Transferred from [facility], heparin drip initiated prior to transfer. Hemodynamically stable. Troponin trended up to 500s. EKG RBBB, inferior infarct. DEJUAN score 2. HEART score 7. proBNP elevated 1200s. Echo showed no regional wall motion abnormality, EF 55%. HbA1C 6.5, lipid studies normal.      - Continue heparin  - Telemetry monitoring  - ASA and clopidogrel   - High dose statin  - Lose dose metoprolol   - Cardiology following - PCI today     HTN:  History of chronic hypertension. /16.      - Continue metoprolol and losartan when BP will tolerate     DM II:  History of diabetes. Kidney function stable. HbA1C 6.5%     -Continue home metformin after PCI  -Monitor kidney function     Prostatitis:  Prostatitis. On 5th week of Bactrim.     -Finish course of Bactrim     Parkinson's:  Stable.      -Continue home carbidopa-levodopa     Chronic pancreatitis  Chronic pancreatitis. No evidence of mass on recent CT abdomen. CA-19-9 normal levels.      -Continue pancrealipase  -Follow up  outpatient     COPD  Non-smoker. Has not used albuterol and Duo-neb for some time. CXR shows some basilar atelectasis.      -Pulse ox  -Incentive spirometer  -Early mobilization      BPH:  History of BPH. Stable.     - Continue home tamsulosin and finasteride

## 2020-06-22 NOTE — RESPIRATORY CARE
COPD EDUCATION by COPD CLINICAL EDUCATOR  6/21/2020 at 4:40 PM by Swathi Wilcox, RRT     COPD Education provided?  Pt states he has COPD and has had Asbestosis exposures with work in his past. He has never had a PFT and is currently on -0- medications or Oxygen. He quit smoking >30yrs. He asked for Pulmonary doctor recommendations in the [place]. Many were given including [facility]. Provided our Information packet about COPD as a reference .  Action Plan Completed/Updated? Not on any medications at this time    Pulmonary Function Testing (PFT)? none    Does patient currently use inhalers/nebulizer at home? no

## 2020-06-22 NOTE — PROGRESS NOTES
Patient has a diagnosis of acute non-STEMI. Notes say angiogram today. No cath report yet.    Echocardiogram shows EF of 55 %. No HF diagnosis has been given.      Below are the defect free care measures that must be met should patient receive an ACS diagnosis after angiogram.     It is strongly recommended that if the patient gets a stent in cath lab today, that up until 1700 today, the bedside RN start the meds to beds process so that discharge is not delayed. Please do not call the on call pharmacist overnight to start the process unless of course, an order is placed to discharge the patient overnight.     ACS Measures:    1. ASA prescribed at discharge  2. Beta blockade prescribed at discharge, if patient also has HFrEF (EF less than or equal to 40%), this needs to be one of the three evidence based beta blockers: carvedilol, bisoprolol, Toprol XL  3. High intensity statin prescribed at discharge (atorvastatin 40 mg or rosuvastatin 20 mg)  4. ACE-I or ARB prescribed on discharge for LVEF less than 40%  5. Aldosterone blockade prescribed for patients with EF less than 40% AND history of diabetes mellitus OR history of heart failure, heart failure on presentation or heart failure as an in-hospital event  6. ICR referral order is placed  7. Use the Acute Coronary Syndrome discharge instructions to document that patient has been provided with the contact information for ICR   8. Evaluation of LV systolic function can be by angiogram, or echo before discharge, or within past year, cannot be a future plan for LVSF assessment  9. ACS education is documented daily  10. For any patient who receives a stent, initiate meds to beds: Get the outpatient order for the script from the physician, or the APRN:  • Medication  • Dose  • Route  • Quantity, how many pills in the bottle, (ie for Plavix this would be 30, since it’s once daily; Brilinta would be 60 since it’s twice daily)  • Refills, most physicians and APRN’s give 11  refills because the patient usually needs to be on the med for one year  • Weekend: Call [phone]: ask for the on-call Anticoagulation Pharmacist to be paged.   • Weekday: call [phone] (anticoagulation clinic)     What if any of the above ACS Measures are contraindicated?    · Request that the discharging provider document the medication/intervention and the contraindication specifically in a progress note  · For example: “no ACE-I meds due to hypotension” is not enough. It needs to say: “No ACE-I, ARNI, ARB due to hypotension”; “No Beta Blockade due to bradycardia”…

## 2020-06-22 NOTE — THERAPY
Occupational Therapy Contact Note       Patient Name: Ricky Spangler  Age:  69 y.o., Sex:  male  Medical Record #: [id number]  Today's Date: 6/22/2020 06/22/20 1427   Interdisciplinary Plan of Care Collaboration   IDT Collaboration with  Nursing   Collaboration Comments OT eval attempted, off floor for procedure. Will re-attempt as able/appropriate     Priya Maher OTR/L  pager# [phone]

## 2020-06-22 NOTE — THERAPY
Missed Therapy     Patient Name: Ricky Spangler  Age:  69 y.o., Sex:  male  Medical Record #: [id number]  Today's Date: 6/22/2020    Discussed missed therapy with RN       06/22/20 0340   Interdisciplinary Plan of Care Collaboration   IDT Collaboration with  Nursing   Collaboration Comments hold, cath today, Will follow up once plan is clear.

## 2020-06-22 NOTE — PROCEDURES
"CARDIAC CATHETERIZATION REPORT    REFERRING: myself    PROCEDURE PHYSICIAN: Esau Salmon MD, [facility], [facility]  ASSISTANT: None    IMPRESSIONS:  1. Acute NSTEMI vs recent IMI  2. Severe LM and RCA disease  3. Mildly impaired LVEF  4. Moderate elevation in LVEDP    Recommendations:  CABG eval, Heparin to resume in 2 hours    Pre-procedure diagnosis: NSTEMI  Post-procedure diagnosis: Same    Procedure performed  Selective coronary angiography  Left heart catheterization  Vascular access closure device (Perclose)    Conscious sedation was supervised by myself and administered by trained personnel using fentanyl and versed between 1545 and 1610. The patient tolerated sedation without complication.     Procedure Description  1. Access: 6 [place] right femoral artery Micropuncture technique was utilized with fluoroscopic guidance following local anesthesia with lidocaine.     2. Diagnostic description: The catheter was passed to the central circulation with the aide of J tipped 0.35\" wire. 6F JR4, 6F JL4 and 6F Pigtail were used to inject the coronary circulation and inject the left ventricle during invasive hemodynamic monitoring.  Once all diagnostic information was obtained the equipment was removed from the body.     3. Hemostasis: Perclose     Findings   Hemodynamics: Aorta: 130/75 mmHg  LV: 130/21 mmHg    Coronary Anatomy   Left Main: Dsital 80% stenosis   LAD: Proximal ectasia with extrinsic calcification and no significant stenosis   LCx: osital 30% stenosis   RCA: Dominant, 30% stenosis proximally. Distally there is a non-occlusive thrombus. The PDA has a mid thrombus, non-occlusive. The PLB is normal.      Left Ventriculography: LVEF= 45%. The basal and mid inferior walls are hypokinetic. No MR, normal root.     Technical Factors  1. Complications: None  2. Estimated Blood Loss: <50 cc  3. Specimens: None  4. Contrast Volume: 45 ml  5. Medications: Heparin 2000 units      "

## 2020-06-22 NOTE — CARE PLAN
Problem: Safety  Goal: Will remain free from falls  Intervention: Implement fall precautions  Note: Fall precautions verified in place. Call light and personal belongings in reach. Pt educated on risk for falls. Treaded slipper socks on both feet. Hourly rounding in place.        Problem: Infection  Goal: Will remain free from infection  Intervention: Implement standard precautions and perform hand washing before and after patient contact  Note: Hand hygiene performed before and after contact with patient.

## 2020-06-22 NOTE — PROGRESS NOTES
Daily Progress Note:     Date of Service: 6/22/2020  Primary Team: [facility] IM Blue Team   Attending: Lexa Arnold M.D.   Senior Resident: Dr. Campbell  Intern: Dr. Suarez  Contact:  [phone]    Chief Complaint:   Chest pain    ID: 69-year-old male transferred from outside hospital for further management of NSTEMI.  Receiving heparin drip.  Pending cardiac cath with or without PCI    NB: Had unclear documentation of CA pancreas/CA prostate on admit, on further chart review no formal diagnosis seen. CT abdomen/pelvis done outside June 2020: Negative for pancreatic mass/malignancy/ Ca prostate.  CA-19-9 normal.     Subjective:   -No acute events overnight, remains symptom-free.    -Denies chest pain/shortness of breath/orthopnea.  No leg swelling  -No fever/chills  -Scheduled for cath/PCI today 6/22/2020    Consultants/Specialty:  Cardiology    Review of Systems:    Review of Systems   Constitutional: Negative for chills and fever.   HENT: Negative for ear pain.    Eyes: Negative for double vision.   Respiratory: Negative for cough, shortness of breath and wheezing.    Cardiovascular: Negative for chest pain, palpitations, orthopnea and leg swelling.   Gastrointestinal: Negative for abdominal pain, diarrhea, nausea and vomiting.   Genitourinary: Negative for dysuria, frequency and hematuria.   Musculoskeletal: Negative for back pain.   Neurological: Negative for dizziness, focal weakness and headaches.   Psychiatric/Behavioral: Negative for depression.       Objective Data:   Physical Exam:   Vitals:   Temp:  [36.2 °C (97.1 °F)-36.8 °C (98.3 °F)] 36.2 °C (97.2 °F)  Pulse:  [54-71] 54  Resp:  [16-18] 18  BP: ()/(48-60) 99/48  SpO2:  [90 %-95 %] 95 %    Physical Exam  Constitutional:       General: He is not in acute distress.  HENT:      Head: Normocephalic and atraumatic.      Nose: No rhinorrhea.      Mouth/Throat:      Mouth: Mucous membranes are moist.   Eyes:      Extraocular Movements: Extraocular  movements intact.      Pupils: Pupils are equal, round, and reactive to light.   Neck:      Musculoskeletal: Normal range of motion.   Cardiovascular:      Rate and Rhythm: Normal rate.      Pulses: Normal pulses.      Heart sounds: No murmur.   Pulmonary:      Effort: Pulmonary effort is normal. No respiratory distress.      Breath sounds: No wheezing.   Abdominal:      General: Abdomen is flat. There is no distension.      Palpations: Abdomen is soft.      Tenderness: There is no abdominal tenderness.   Musculoskeletal: Normal range of motion.         General: No swelling.   Skin:     General: Skin is warm and dry.   Neurological:      General: No focal deficit present.      Mental Status: He is alert and oriented to person, place, and time. Mental status is at baseline.   Psychiatric:         Mood and Affect: Mood normal.         Behavior: Behavior normal.         Quality Measures  Quality-Core Measures   Reviewed items::  EKG reviewed, Labs reviewed and Medications reviewed  Anderson catheter::  No Anderson  DVT prophylaxis pharmacological::  Heparin  Ulcer Prophylaxis::  Yes      Labs:   Recent Labs     06/20/20 0623 06/21/20 0010 06/22/20 0317   WBC 9.3 7.1 7.1   RBC 4.54* 4.53* 4.61*   HEMOGLOBIN 14.0 14.1 14.3   HEMATOCRIT 43.6 43.2 44.0   MCV 96.0 95.4 95.4   MCH 30.8 31.1 31.0   RDW 48.6 48.4 47.8   PLATELETCT 217 205 205   MPV 9.2 9.1 9.5   NEUTSPOLYS 65.50  --   --    LYMPHOCYTES 22.60  --   --    MONOCYTES 9.40  --   --    EOSINOPHILS 1.10  --   --    BASOPHILS 1.00  --   --       Recent Labs     06/20/20 0623 06/21/20 0010 06/22/20 0317   SODIUM 131* 138 134*   POTASSIUM 4.1 4.3 4.4   CHLORIDE 99 106 102   CO2 23 25 22   GLUCOSE 122* 108* 108*   BUN 13 11 14      Recent Labs     06/20/20 0623 06/21/20 0010 06/22/20 0317   ALBUMIN 3.6  --  3.6   TBILIRUBIN 0.2  --  0.4   ALKPHOSPHAT 61  --  63   TOTPROTEIN 6.2  --  6.2   ALTSGPT 6  --  10   ASTSGOT 21  --  18   CREATININE 0.85 0.85 0.98      Lab  Results   Component Value Date/Time    PROTHROMBTM 13.4 06/22/2020 03:17 AM    INR 0.99 06/22/2020 03:17 AM         Imaging:   EC-ECHOCARDIOGRAM COMPLETE W/ CONT   Final Result      OUTSIDE IMAGES-CT ABDOMEN /PELVIS   Final Result      OUTSIDE IMAGES-DX CHEST   Final Result      CL-LEFT HEART CATHETERIZATION WITH POSSIBLE INTERVENTION    (Results Pending)        NSTEMI (non-ST elevated myocardial infarction) ([facility])  Assessment & Plan  Patient transferred from outside hospital with complaints of chest pain and elevated troponin.  Started on heparin drip from outside hospital.  Hemodynamically stable on admit.  EKG shows bradycardia and RBBB, no ST segment changes.  Troponin T trended up to 500s.  Repeat EKG suggestive of inferior wall MI.  HEART score 7, DEJUAN 2+. -Echo: Poor study, EF 55%, no regional wall motion noted.  proBNP elevated 1200s.  HbA1c 6.5, lipid panel normal  -Telemetry monitoring  -Continue heparin drip  -Continue ASA, statin  -Started on low-dose metoprolol  -Cath/PCI today 6/22/2020  -Cardiology following, appreciate their recommendations    Hypertension  Assessment & Plan  -Known history of hypertension.  On losartan at home. low SBP on admit  -Resume medications when appropriate    Parkinson's disease ([facility])  Assessment & Plan  -Known history of Parkinson's disease.  On Sinemet at home.  -Continue home meds    COPD (chronic obstructive pulmonary disease) ([facility])  Assessment & Plan  -Known history of COPD, on inhalers at home, ran out of medication recently. Not in exacerbation. CXR from outside grossly unremarkable except for right basilar atelectasis.  -RT protocol, oxygen per protocol  -IS      BPH (benign prostatic hyperplasia)  Assessment & Plan  -On finasteride and tamsulosin at home  -Continue home medications    Prostatitis  Assessment & Plan  -History of prostatitis, on fifth week of Bactrim at home.   -Continue Bactrim    Pancreatitis, chronic ([facility])  Assessment & Plan  -History of chronic  pancreatitis.  Recent CT abdomen/pelvis from outside negative for pancreatic mass/malignancy.  No formal diagnosis of pancreatic malignancy.  CA-19-9 normal. On enzyme supplementation at home  -Resume enzyme supplements    Type 2 diabetes mellitus ([facility])  Assessment & Plan  -Known history of type 2 diabetes mellitus.  On metformin at home.  HbA1c 6.5   -Resume medication after PCI  -Maintain euglycemia, blood glucose 140 to 180 mg/dL

## 2020-06-23 ENCOUNTER — HOSPITAL ENCOUNTER (OUTPATIENT)
Facility: MEDICAL CENTER | Age: 70
End: 2020-06-23
Attending: THORACIC SURGERY (CARDIOTHORACIC VASCULAR SURGERY) | Admitting: THORACIC SURGERY (CARDIOTHORACIC VASCULAR SURGERY)
Payer: MEDICARE

## 2020-06-23 ENCOUNTER — APPOINTMENT (OUTPATIENT)
Dept: RADIOLOGY | Facility: MEDICAL CENTER | Age: 70
DRG: 233 | End: 2020-06-23
Attending: PHYSICIAN ASSISTANT
Payer: MEDICARE

## 2020-06-23 ENCOUNTER — APPOINTMENT (OUTPATIENT)
Dept: RADIOLOGY | Facility: MEDICAL CENTER | Age: 70
DRG: 233 | End: 2020-06-23
Attending: INTERNAL MEDICINE
Payer: MEDICARE

## 2020-06-23 LAB
ANION GAP SERPL CALC-SCNC: 12 MMOL/L (ref 7–16)
APTT PPP: 56.4 SEC (ref 24.7–36)
BUN SERPL-MCNC: 14 MG/DL (ref 8–22)
CALCIUM SERPL-MCNC: 8.8 MG/DL (ref 8.5–10.5)
CHLORIDE SERPL-SCNC: 104 MMOL/L (ref 96–112)
CO2 SERPL-SCNC: 21 MMOL/L (ref 20–33)
COVID ORDER STATUS COVID19: NORMAL
CREAT SERPL-MCNC: 0.94 MG/DL (ref 0.5–1.4)
ERYTHROCYTE [DISTWIDTH] IN BLOOD BY AUTOMATED COUNT: 48 FL (ref 35.9–50)
GLUCOSE SERPL-MCNC: 105 MG/DL (ref 65–99)
HCT VFR BLD AUTO: 43.7 % (ref 42–52)
HGB BLD-MCNC: 14.2 G/DL (ref 14–18)
LACTATE BLD-SCNC: 0.9 MMOL/L (ref 0.5–2)
LIPASE SERPL-CCNC: 19 U/L (ref 11–82)
MAGNESIUM SERPL-MCNC: 1.9 MG/DL (ref 1.5–2.5)
MCH RBC QN AUTO: 30.9 PG (ref 27–33)
MCHC RBC AUTO-ENTMCNC: 32.5 G/DL (ref 33.7–35.3)
MCV RBC AUTO: 95 FL (ref 81.4–97.8)
PLATELET # BLD AUTO: 208 K/UL (ref 164–446)
PMV BLD AUTO: 9.7 FL (ref 9–12.9)
POTASSIUM SERPL-SCNC: 4.4 MMOL/L (ref 3.6–5.5)
RBC # BLD AUTO: 4.6 M/UL (ref 4.7–6.1)
SARS-COV-2 RNA RESP QL NAA+PROBE: NOTDETECTED
SODIUM SERPL-SCNC: 137 MMOL/L (ref 135–145)
SPECIMEN SOURCE: NORMAL
WBC # BLD AUTO: 9 K/UL (ref 4.8–10.8)

## 2020-06-23 PROCEDURE — 700102 HCHG RX REV CODE 250 W/ 637 OVERRIDE(OP): Performed by: INTERNAL MEDICINE

## 2020-06-23 PROCEDURE — 700111 HCHG RX REV CODE 636 W/ 250 OVERRIDE (IP): Performed by: EMERGENCY MEDICINE

## 2020-06-23 PROCEDURE — 94760 N-INVAS EAR/PLS OXIMETRY 1: CPT

## 2020-06-23 PROCEDURE — C9803 HOPD COVID-19 SPEC COLLECT: HCPCS | Performed by: PHYSICIAN ASSISTANT

## 2020-06-23 PROCEDURE — 93970 EXTREMITY STUDY: CPT

## 2020-06-23 PROCEDURE — 770020 HCHG ROOM/CARE - TELE (206)

## 2020-06-23 PROCEDURE — 83735 ASSAY OF MAGNESIUM: CPT

## 2020-06-23 PROCEDURE — 99232 SBSQ HOSP IP/OBS MODERATE 35: CPT | Mod: GC | Performed by: INTERNAL MEDICINE

## 2020-06-23 PROCEDURE — A9270 NON-COVERED ITEM OR SERVICE: HCPCS | Performed by: STUDENT IN AN ORGANIZED HEALTH CARE EDUCATION/TRAINING PROGRAM

## 2020-06-23 PROCEDURE — 83605 ASSAY OF LACTIC ACID: CPT

## 2020-06-23 PROCEDURE — 93880 EXTRACRANIAL BILAT STUDY: CPT

## 2020-06-23 PROCEDURE — U0004 COV-19 TEST NON-CDC HGH THRU: HCPCS

## 2020-06-23 PROCEDURE — 99232 SBSQ HOSP IP/OBS MODERATE 35: CPT | Performed by: INTERNAL MEDICINE

## 2020-06-23 PROCEDURE — 85730 THROMBOPLASTIN TIME PARTIAL: CPT

## 2020-06-23 PROCEDURE — 71045 X-RAY EXAM CHEST 1 VIEW: CPT

## 2020-06-23 PROCEDURE — 700102 HCHG RX REV CODE 250 W/ 637 OVERRIDE(OP): Performed by: STUDENT IN AN ORGANIZED HEALTH CARE EDUCATION/TRAINING PROGRAM

## 2020-06-23 PROCEDURE — A9270 NON-COVERED ITEM OR SERVICE: HCPCS | Performed by: INTERNAL MEDICINE

## 2020-06-23 PROCEDURE — 700111 HCHG RX REV CODE 636 W/ 250 OVERRIDE (IP): Performed by: INTERNAL MEDICINE

## 2020-06-23 PROCEDURE — 80048 BASIC METABOLIC PNL TOTAL CA: CPT

## 2020-06-23 PROCEDURE — 99223 1ST HOSP IP/OBS HIGH 75: CPT | Performed by: THORACIC SURGERY (CARDIOTHORACIC VASCULAR SURGERY)

## 2020-06-23 PROCEDURE — 700105 HCHG RX REV CODE 258: Performed by: INTERNAL MEDICINE

## 2020-06-23 PROCEDURE — 85027 COMPLETE CBC AUTOMATED: CPT

## 2020-06-23 PROCEDURE — 83690 ASSAY OF LIPASE: CPT

## 2020-06-23 RX ORDER — HYDROMORPHONE HYDROCHLORIDE 1 MG/ML
0.5 INJECTION, SOLUTION INTRAMUSCULAR; INTRAVENOUS; SUBCUTANEOUS ONCE
Status: COMPLETED | OUTPATIENT
Start: 2020-06-23 | End: 2020-06-23

## 2020-06-23 RX ORDER — SODIUM CHLORIDE, SODIUM LACTATE, POTASSIUM CHLORIDE, CALCIUM CHLORIDE 600; 310; 30; 20 MG/100ML; MG/100ML; MG/100ML; MG/100ML
INJECTION, SOLUTION INTRAVENOUS CONTINUOUS
Status: DISCONTINUED | OUTPATIENT
Start: 2020-06-23 | End: 2020-06-23

## 2020-06-23 RX ORDER — ALPRAZOLAM 1 MG/1
1 TABLET ORAL ONCE
Status: COMPLETED | OUTPATIENT
Start: 2020-06-23 | End: 2020-06-23

## 2020-06-23 RX ORDER — ONDANSETRON 2 MG/ML
4 INJECTION INTRAMUSCULAR; INTRAVENOUS EVERY 8 HOURS PRN
Status: DISCONTINUED | OUTPATIENT
Start: 2020-06-23 | End: 2020-06-25

## 2020-06-23 RX ADMIN — VALACYCLOVIR HYDROCHLORIDE 1000 MG: 500 TABLET, FILM COATED ORAL at 05:51

## 2020-06-23 RX ADMIN — ASPIRIN 81 MG: 81 TABLET, COATED ORAL at 05:51

## 2020-06-23 RX ADMIN — CARBIDOPA AND LEVODOPA 1 TABLET: 25; 100 TABLET ORAL at 17:21

## 2020-06-23 RX ADMIN — CARBIDOPA AND LEVODOPA 1 TABLET: 25; 100 TABLET ORAL at 05:51

## 2020-06-23 RX ADMIN — HEPARIN SODIUM 1000 UNITS/HR: 5000 INJECTION, SOLUTION INTRAVENOUS at 13:28

## 2020-06-23 RX ADMIN — PANCRELIPASE 6000 UNITS: 30000; 6000; 19000 CAPSULE, DELAYED RELEASE PELLETS ORAL at 07:44

## 2020-06-23 RX ADMIN — TAMSULOSIN HYDROCHLORIDE 0.4 MG: 0.4 CAPSULE ORAL at 05:51

## 2020-06-23 RX ADMIN — SULFAMETHOXAZOLE AND TRIMETHOPRIM 1 TABLET: 800; 160 TABLET ORAL at 17:21

## 2020-06-23 RX ADMIN — FINASTERIDE 5 MG: 5 TABLET, FILM COATED ORAL at 05:51

## 2020-06-23 RX ADMIN — METOPROLOL TARTRATE 12.5 MG: 25 TABLET, FILM COATED ORAL at 05:51

## 2020-06-23 RX ADMIN — PAROXETINE HYDROCHLORIDE 40 MG: 20 TABLET, FILM COATED ORAL at 05:51

## 2020-06-23 RX ADMIN — OMEPRAZOLE 20 MG: 20 CAPSULE, DELAYED RELEASE ORAL at 05:51

## 2020-06-23 RX ADMIN — ALPRAZOLAM 1 MG: 1 TABLET ORAL at 20:43

## 2020-06-23 RX ADMIN — PANCRELIPASE 6000 UNITS: 30000; 6000; 19000 CAPSULE, DELAYED RELEASE PELLETS ORAL at 17:22

## 2020-06-23 RX ADMIN — ATORVASTATIN CALCIUM 80 MG: 80 TABLET, FILM COATED ORAL at 17:21

## 2020-06-23 RX ADMIN — METOPROLOL TARTRATE 12.5 MG: 25 TABLET, FILM COATED ORAL at 17:22

## 2020-06-23 RX ADMIN — SULFAMETHOXAZOLE AND TRIMETHOPRIM 1 TABLET: 800; 160 TABLET ORAL at 07:44

## 2020-06-23 RX ADMIN — PANCRELIPASE 6000 UNITS: 30000; 6000; 19000 CAPSULE, DELAYED RELEASE PELLETS ORAL at 13:02

## 2020-06-23 RX ADMIN — SODIUM CHLORIDE, POTASSIUM CHLORIDE, SODIUM LACTATE AND CALCIUM CHLORIDE: 600; 310; 30; 20 INJECTION, SOLUTION INTRAVENOUS at 04:05

## 2020-06-23 RX ADMIN — ONDANSETRON 4 MG: 2 INJECTION INTRAMUSCULAR; INTRAVENOUS at 03:13

## 2020-06-23 RX ADMIN — CARBIDOPA AND LEVODOPA 1 TABLET: 25; 100 TABLET ORAL at 11:09

## 2020-06-23 RX ADMIN — HYDROMORPHONE HYDROCHLORIDE 0.5 MG: 1 INJECTION, SOLUTION INTRAMUSCULAR; INTRAVENOUS; SUBCUTANEOUS at 04:05

## 2020-06-23 ASSESSMENT — ENCOUNTER SYMPTOMS
EYE PAIN: 0
PHOTOPHOBIA: 0
VOMITING: 0
BRUISES/BLEEDS EASILY: 0
PALPITATIONS: 0
PND: 0
DIARRHEA: 0
HEMOPTYSIS: 0
HEADACHES: 0
ABDOMINAL PAIN: 0
HALLUCINATIONS: 0
SPEECH CHANGE: 0
DOUBLE VISION: 0
CHEST TIGHTNESS: 0
MEMORY LOSS: 0
BACK PAIN: 0
NAUSEA: 1
DEPRESSION: 0
SEIZURES: 0
HEARTBURN: 0
BLOOD IN STOOL: 0
ABDOMINAL PAIN: 1
WHEEZING: 0
SORE THROAT: 0
FEVER: 0
FOCAL WEAKNESS: 0
ORTHOPNEA: 0
BLURRED VISION: 0
POLYDIPSIA: 0
DIZZINESS: 0
CHILLS: 0
CONSTIPATION: 0
COUGH: 0
NAUSEA: 0
WEIGHT LOSS: 0
SHORTNESS OF BREATH: 0
TREMORS: 0

## 2020-06-23 ASSESSMENT — FIBROSIS 4 INDEX: FIB4 SCORE: 1.92

## 2020-06-23 NOTE — PROGRESS NOTES
Assumed care of pt. Bedside report received from day R.N. Pt denies chest pain or SOB. VSS. Pt resting comfortably. Bed rest until 2230. Radial and groin site CDI, and soft. All needs met. Fall precautions in place. Bed low and locked, call light in reach. Discussed POC.

## 2020-06-23 NOTE — PROGRESS NOTES
Cardiology Follow Up Progress Note    Date of Service  6/23/2020    Attending Physician  Lexa Arnold M.D.    ENEIDA Spangler is a 70 y.o. male admitted 6/20/2020 with acute coronary syndrome.    Interim Events  6/23: No cardiac symptoms.  Tolerated cardiac catheterization yesterday.    Review of Systems  Review of Systems   Respiratory: Negative for chest tightness and shortness of breath.    Cardiovascular: Negative for chest pain and palpitations.   Gastrointestinal: Negative for abdominal pain and nausea.   Neurological: Negative for dizziness and headaches.       Vital signs in last 24 hours  Temp:  [36.2 °C (97.1 °F)-36.7 °C (98.1 °F)] 36.3 °C (97.3 °F)  Pulse:  [46-71] 66  Resp:  [18-20] 20  BP: ()/(48-78) 156/78  SpO2:  [90 %-97 %] 92 %    Physical Exam  Physical Exam  Constitutional:       General: He is not in acute distress.  HENT:      Head: Normocephalic.   Eyes:      General: No scleral icterus.     Extraocular Movements: Extraocular movements intact.   Cardiovascular:      Rate and Rhythm: Normal rate and regular rhythm.      Heart sounds: Normal heart sounds, S1 normal and S2 normal. No murmur. No friction rub. No gallop.    Pulmonary:      Effort: Pulmonary effort is normal.      Breath sounds: Normal breath sounds. No wheezing, rhonchi or rales.   Musculoskeletal:      Right lower leg: No edema.      Left lower leg: No edema.   Skin:     General: Skin is warm and dry.   Neurological:      Mental Status: He is alert and oriented to person, place, and time.   Psychiatric:         Behavior: Behavior normal.         Lab Review  Lab Results   Component Value Date/Time    WBC 9.0 06/23/2020 01:17 AM    RBC 4.60 (L) 06/23/2020 01:17 AM    HEMOGLOBIN 14.2 06/23/2020 01:17 AM    HEMATOCRIT 43.7 06/23/2020 01:17 AM    MCV 95.0 06/23/2020 01:17 AM    MCH 30.9 06/23/2020 01:17 AM    MCHC 32.5 (L) 06/23/2020 01:17 AM    MPV 9.7 06/23/2020 01:17 AM      Lab Results   Component Value Date/Time     SODIUM 137 06/23/2020 01:17 AM    POTASSIUM 4.4 06/23/2020 01:17 AM    CHLORIDE 104 06/23/2020 01:17 AM    CO2 21 06/23/2020 01:17 AM    GLUCOSE 105 (H) 06/23/2020 01:17 AM    BUN 14 06/23/2020 01:17 AM    CREATININE 0.94 06/23/2020 01:17 AM      Lab Results   Component Value Date/Time    ASTSGOT 18 06/22/2020 03:17 AM    ALTSGPT 10 06/22/2020 03:17 AM     Lab Results   Component Value Date/Time    CHOLSTRLTOT 138 06/20/2020 02:25 PM    LDL 77 06/20/2020 02:25 PM    HDL 41 06/20/2020 02:25 PM    TRIGLYCERIDE 100 06/20/2020 02:25 PM    TROPONINT 455 (H) 06/21/2020 08:38 AM       Recent Labs     06/20/20  1425   NTPROBNP 1207*       Cardiac Imaging and Procedures Review  Rhythm:  My personal interpretation of the rhythm dated/23 is normal sinus rhythm    Echocardiogram: 06/22/2020  No prior study is available for comparison.   Technically difficult study - adequate information is obtained.   Normal left ventricular chamber size.  Mild concentric left ventricular hypertrophy.  Normal left ventricular systolic function.    Cardiac Catheterization: 06/22/2020  1. Acute NSTEMI vs recent IMI  2. Severe LM and RCA disease  3. Mildly impaired LVEF  4. Moderate elevation in LVEDP    Assessment/Plan  1.  NSTEMI.  2.  CAD, complex with left main artery and right coronary artery disease.  3.  Diabetes mellitus.  4.  Dyslipidemia.  5.  Right colectomy for colon cancer.  6.  Parkinson's disease.  7.  Chronic prostatism.  8.  RBBB.  9.  Chronic pancreatic insufficiency.    Recommendation Discussion  1.  Continue current cardiac therapy including IV heparin.  2.  Cardiothoracic surgery to see in consultation for CABG.  3.  Discussed with patient and bedside RN.    Thank you for allowing me to participate in the care of this patient.    Please contact me with any questions.    Osmani Hirsch M.D.   Cardiologist, [facility]  [phone]

## 2020-06-23 NOTE — RESPIRATORY CARE
Oxygen Rounds      Patient found on    O2 L/m:  __1_______    Oxygen device:  __nc______   Spo2: ____91_____%      Respiratory device skin site inspection completed.

## 2020-06-23 NOTE — CONSULTS
REFERRING PHYSICIAN: Esau Salmon MD.     CONSULTING PHYSICIAN: Mychal Linda DO.    CHIEF COMPLAINT: Chest pain, nausea.    HISTORY OF PRESENT ILLNESS: The patient is a 70 y.o. male with history significant for HTN, type II DM, COPD, recently diagnosed Parkinson's disease, colon cancer s/p colon resection (10 years prior) and chronic pancreatitis who presented to the hospital near [place], [place] with abdominal pain, nausea and chest pain.  He was flown to [facility] where he was diagnosed with an NSTEMI.  Cardiac cath shows multivessel coronary artery disease and we were asked to consult.  Upon arrival there was confusion of an active pancreatitis case.  Labwork and further work up shows no pancreatitis during this admission.  He is on Bactrim for dysuria. There was also question of a previous desire to be DNI/DNR but after prolonged discussion the patient is adamant that he is full code.  He recently retired.  Has wife and son living at home with him in [place].  Denies alcohol abuse and active tobacco abuse.  Previous tobacco and marijuana use.      PAST MEDICAL HISTORY:   HTN, Type II diabetes, COPD, Parkinson's disease, colon cancer s/p colon resection, chronic pancreatitis, previous tobacco abuse.     PAST SURGICAL HISTORY:   Colon resection 10 years ago.    FAMILY HISTORY:   Reviewed with patient.  No significant family history.     SOCIAL HISTORY:   Social History     Socioeconomic History   • Marital status:      Spouse name: Not on file   • Number of children: Not on file   • Years of education: Not on file   • Highest education level: Not on file   Occupational History   • Not on file   Social Needs   • Financial resource strain: Not on file   • Food insecurity     Worry: Not on file     Inability: Not on file   • Transportation needs     Medical: Not on file     Non-medical: Not on file   Tobacco Use   • Smoking status: Former Smoker   • Smokeless tobacco: Never Used   • Tobacco comment:  pt quit smoking when he was 21.   Substance and Sexual Activity   • Alcohol use: Not on file   • Drug use: Not on file   • Sexual activity: Not on file   Lifestyle   • Physical activity     Days per week: Not on file     Minutes per session: Not on file   • Stress: Not on file   Relationships   • Social connections     Talks on phone: Not on file     Gets together: Not on file     Attends Worship service: Not on file     Active member of club or organization: Not on file     Attends meetings of clubs or organizations: Not on file     Relationship status: Not on file   • Intimate partner violence     Fear of current or ex partner: Not on file     Emotionally abused: Not on file     Physically abused: Not on file     Forced sexual activity: Not on file   Other Topics Concern   • Not on file   Social History Narrative   • Not on file       ALLERGIES:   Allergies   Allergen Reactions   • Tetanus Toxoid Swelling        CURRENT MEDICATIONS:     Current Facility-Administered Medications:   •  ondansetron (ZOFRAN) syringe/vial injection 4 mg, 4 mg, Intravenous, Q8HRS PRN, Candace Nath M.D., 4 mg at 06/23/20 0313  •  metoprolol (LOPRESSOR) tablet 12.5 mg, 12.5 mg, Oral, TWICE DAILY, Esau Salmon M.D., 12.5 mg at 06/23/20 0551  •  heparin injection 2,600 Units, 2,600 Units, Intravenous, PRN **AND** heparin infusion 25,000 units in 500 mL 0.45% NACL, , Intravenous, Continuous, Last Rate: 20 mL/hr at 06/23/20 0712, 1,000 Units/hr at 06/23/20 0712 **AND** Protocol 440 Heparin Weight Based DO NOT GIVE ANY HEPARIN BOLUS TO STROKE PATIENT, , , CONTINUOUS **AND** Protocol 440 Heparin Weight Based Discontinue Enoxaparin (Lovenox), Dabigatran (Pradaxa), Rivaroxaban (Xarelto), Apixaban (Eliquis), Edoxaban (Savaysa, Lixiana), Fondaparinux (Arixtra) and Argatroban prior to heparin administration, , , Once **AND** Protocol 440 Heparin Weight Based Draw baseline aPTT, PT, and platelet count if not already done, , , CONTINUOUS **AND**  Protocol 440 Heparin Weight Based Draw aPTT 6 hours after beginning infusion. , , , CONTINUOUS **AND** Protocol 440 Heparin Weight Based Record Patient Data, , , CONTINUOUS **AND** Protocol 440 Heparin Weight Based INSTRUCTIONS, , , CONTINUOUS **AND** Protocol 440 Heparin Weight Based Review aPTT results 6 hours after infusion is begun as detailed, , , CONTINUOUS **AND** Protocol 440 Heparin Weight Based Draw Platelet count every three days. Contact MD if platelet is 50% lower than baseline count., , , CONTINUOUS **AND** Protocol 440 Heparin Weight Based Adjust heparin to maintain aPTT between 55-96 sec, , , CONTINUOUS **AND** Protocol 440 Heparin Weight Based Order aPTT 6 hours after any rate change or hold until aPTT is therapeutic (55-96 seconds), , , CONTINUOUS **AND** Protocol 440 Heparin Weight Based Documentation and verification, , , CONTINUOUS, Kendra Marcum D.O.  •  senna-docusate (PERICOLACE or SENOKOT S) 8.6-50 MG per tablet 2 Tab, 2 Tab, Oral, BID **AND** polyethylene glycol/lytes (MIRALAX) PACKET 1 Packet, 1 Packet, Oral, QDAY PRN **AND** magnesium hydroxide (MILK OF MAGNESIA) suspension 30 mL, 30 mL, Oral, QDAY PRN **AND** bisacodyl (DULCOLAX) suppository 10 mg, 10 mg, Rectal, QDAY PRN, Jia Suarez M.D.  •  aspirin EC (ECOTRIN) tablet 81 mg, 81 mg, Oral, DAILY, Jia Suarez M.D., 81 mg at 06/23/20 0551  •  atorvastatin (LIPITOR) tablet 80 mg, 80 mg, Oral, Q EVENING, Jia Suarez M.D., 80 mg at 06/22/20 1735  •  omeprazole (PRILOSEC) capsule 20 mg, 20 mg, Oral, DAILY, Jia Suarez M.D., 20 mg at 06/23/20 0551  •  carbidopa-levodopa (SINEMET)  MG tablet 1 Tab, 1 Tab, Oral, TID, Jia Suarez M.D., 1 Tab at 06/23/20 0551  •  finasteride (PROSCAR) tablet 5 mg, 5 mg, Oral, DAILY, Jia Suarez M.D., 5 mg at 06/23/20 0551  •  pancrelipase (Lip-Prot-Amyl) (CREON 6000) 6000 units capsule 6,000 Units, 1 Cap, Oral, TID WITH MEALS, Jia Suarez M.D., 6,000 Units at 06/23/20  0744  •  PARoxetine (PAXIL) tablet 40 mg, 40 mg, Oral, DAILY, Jia Suarez M.D., 40 mg at 06/23/20 0551  •  tamsulosin (FLOMAX) capsule 0.4 mg, 0.4 mg, Oral, DAILY, Jia Suarez M.D., 0.4 mg at 06/23/20 0551  •  valACYclovir (VALTREX) caplet 1,000 mg, 1,000 mg, Oral, DAILY, Jia Suarez M.D., 1,000 mg at 06/23/20 0551  •  sulfamethoxazole-trimethoprim (BACTRIM DS) 800-160 MG tablet 1 Tab, 1 Tab, Oral, Q12HRS, Jia Suarez M.D., 1 Tab at 06/23/20 0744  •  albuterol inhaler 2 Puff, 2 Puff, Inhalation, Q4H PRN (RT), Jia Suarez M.D.     LABS REVIEWED:  Lab Results   Component Value Date/Time    SODIUM 137 06/23/2020 01:17 AM    POTASSIUM 4.4 06/23/2020 01:17 AM    CHLORIDE 104 06/23/2020 01:17 AM    CO2 21 06/23/2020 01:17 AM    GLUCOSE 105 (H) 06/23/2020 01:17 AM    BUN 14 06/23/2020 01:17 AM    CREATININE 0.94 06/23/2020 01:17 AM      Lab Results   Component Value Date/Time    PROTHROMBTM 13.4 06/22/2020 03:17 AM    INR 0.99 06/22/2020 03:17 AM      Lab Results   Component Value Date/Time    WBC 9.0 06/23/2020 01:17 AM    RBC 4.60 (L) 06/23/2020 01:17 AM    HEMOGLOBIN 14.2 06/23/2020 01:17 AM    HEMATOCRIT 43.7 06/23/2020 01:17 AM    MCV 95.0 06/23/2020 01:17 AM    MCH 30.9 06/23/2020 01:17 AM    MCHC 32.5 (L) 06/23/2020 01:17 AM    RDW 48.0 06/23/2020 01:17 AM    PLATELETCT 208 06/23/2020 01:17 AM    MPV 9.7 06/23/2020 01:17 AM    NEUTSPOLYS 65.50 06/20/2020 06:23 AM    LYMPHOCYTES 22.60 06/20/2020 06:23 AM    MONOCYTES 9.40 06/20/2020 06:23 AM    EOSINOPHILS 1.10 06/20/2020 06:23 AM    BASOPHILS 1.00 06/20/2020 06:23 AM        IMAGING REVIEWED AND INTERPRETED:    ECHOCARDIOGRAM: 6/20/2020  No prior study is available for comparison.   Technically difficult study - adequate information is obtained.   Normal left ventricular chamber size.  Mild concentric left ventricular hypertrophy.  Normal left ventricular systolic function.    ANGIOGRAM: 6/22/2020  Coronary Anatomy              Left Main:  Dsital 80% stenosis              LAD: Proximal ectasia with extrinsic calcification and no significant stenosis              LCx: osital 30% stenosis              RCA: Dominant, 30% stenosis proximally. Distally there is a non-occlusive thrombus. The PDA has a mid thrombus, non-occlusive. The PLB is normal.                 Left Ventriculography: LVEF= 45%. The basal and mid inferior walls are hypokinetic. No MR, normal root.     REVIEW OF SYSTEMS:   Review of Systems   Constitutional: Negative for chills, fever and weight loss.   HENT: Negative for ear pain, nosebleeds and tinnitus.    Eyes: Negative for double vision, photophobia and pain.   Respiratory: Negative for cough, hemoptysis and shortness of breath.    Cardiovascular: Positive for chest pain. Negative for palpitations, orthopnea, leg swelling and PND.   Gastrointestinal: Positive for abdominal pain and nausea. Negative for blood in stool and vomiting.   Genitourinary: Positive for dysuria. Negative for frequency, hematuria and urgency.   Musculoskeletal: Positive for joint pain.   Skin: Negative for rash.   Neurological: Negative for dizziness, tremors, speech change, focal weakness, seizures and headaches.   Endo/Heme/Allergies: Negative for polydipsia. Does not bruise/bleed easily.   Psychiatric/Behavioral: Negative for hallucinations and memory loss.       PHYSICAL EXAMINATION:   Physical Exam   Constitutional: He is oriented to person, place, and time and well-developed, well-nourished, and in no distress. No distress.   HENT:   Head: Normocephalic and atraumatic.   Eyes: Pupils are equal, round, and reactive to light.   Neck: Neck supple. No JVD present.   Cardiovascular: Normal rate, regular rhythm, normal heart sounds and intact distal pulses. Exam reveals no gallop and no friction rub.   No murmur heard.  Pulmonary/Chest: Effort normal and breath sounds normal. No respiratory distress. He has no wheezes. He has no rales.   Abdominal: Soft. He  "exhibits no distension. There is abdominal tenderness. There is no rebound.   Musculoskeletal: Normal range of motion.         General: No tenderness, deformity or edema.   Neurological: He is alert and oriented to person, place, and time. Gait normal. GCS score is 15.   Skin: Skin is warm and dry. No rash noted. No erythema.   Psychiatric: Memory and affect normal.   Nursing note and vitals reviewed.    CONSTITUTIONAL:  BP (!) 98/59   Pulse 60   Temp 36.2 °C (97.2 °F) (Temporal)   Resp 18   Ht 1.702 m (5' 7\")   Wt 81.5 kg (179 lb 10.8 oz)   SpO2 91%         IMPRESSION:  Non-STEMI, multivessel coronary disease, Parkinson's disease, hypertension, diabetes, history of chronic pancreatitis, history of prostatitis currently on Bactrim, BPH, COPD      PLAN:  I recommend undergo coronary artery bypass grafting x2-3.  The patient did complain of symptoms which he attributed to pancreatitis, but appears to not have ongoing pancreatitis.  He is concerned that it is related to his non-STEMI.  I discussed this with him in detail.  I reviewed his angiogram personally.  He has thrombus in his distal right and and PDA.  I discussed with the patient that I would attempt to perform a thrombectomy, and likely a bypass graft depending on the quality of the target.  I also explained that because there is no flow-limiting stenosis in the vessel itself, that there is increased potential for the vein graft to the right to fail.  I The procedure, its risks, benefits, potential complications and alternative treatments were discussed with the patient in detail including the risks should he decide not to undergo my recommended treatment. All of his questions were answered to his satisfaction and he is willing to proceed with the operation. The risks include death, stroke,  infection: to include a rare bacterial infection related to the use of the heart/lung machine, cassidy-operative myocardial infarction, dysrhythmias, diaphragmatic " paralysis, chest wall paresthesia, tracheostomy, kidney or other organ failure, possible return to the operating room for bleeding, bleeding requiring transfusion with its attendant risks including AIDS or hepatitis, dehiscence of surgical incisions, respiratory complications including the need for prolonged ventilator support, Protamine or other drug reaction, peripheral neuropathy, loss of limb, and miscount of surgical items. The operative mortality risk is approximately 4%. The STS mortality risk score is 3.7% and the morbidity and mortality risk score is 22%. The scores were discussed with patient.    The operation, CABG x2-3 is scheduled for June 25 at 1:30 AM at [facility].    Sincerely,       Mychal Linda DO.      Mychal STAFFORD DO performed a substantial portion of the EM visit face-to-face with the same patient on the same date of service with the APRN, Fabiola Bernal PA-C. I was personally involved in reviewing and interpreting the films and conducted elements of the history and physical exam. I performed all of the medical decision making for the patient.

## 2020-06-23 NOTE — PROGRESS NOTES
Daily Progress Note:     Date of Service: 6/23/2020  Primary Team: [facility] Blue Team   Attending: Lexa Arnold M.D.   Senior Resident: Dr. Campbell  Intern: Dr. Suarez  Contact:  [phone]    Chief Complaint:   Chest pain    ID: 69-year-old male transferred from outside hospital for further management of NSTEMI.  Receiving heparin drip.  Cardiac cath remarkable for multivessel disease and reduced EF 45%.  Recommended CABG, pending CT surgery assessment  NB: Had unclear documentation of CA pancreas/CA prostate on admit, on further chart review no formal diagnosis seen. CT abdomen/pelvis done outside June 2020: Negative for pancreatic mass/malignancy/ Ca prostate.  CA-19-9 normal.     Subjective:   -Patient had an episode of epigastric pain yesterday associated with nausea.  Subsided with pain medications.  Remained asymptomatic since then.  Could be secondary to cardiac etiology  -Denies chest pain/shortness of breath/orthopnea  -No fever/chills    Consultants/Specialty:  Cardiology    Review of Systems:    Review of Systems   Constitutional: Negative for chills and fever.   HENT: Negative for ear pain.    Eyes: Negative for double vision.   Respiratory: Negative for cough, shortness of breath and wheezing.    Cardiovascular: Negative for chest pain, palpitations, orthopnea and leg swelling.   Gastrointestinal: Negative for abdominal pain, diarrhea, nausea and vomiting.   Genitourinary: Negative for dysuria, frequency and hematuria.   Musculoskeletal: Negative for back pain.   Neurological: Negative for dizziness, focal weakness and headaches.   Psychiatric/Behavioral: Negative for depression.       Objective Data:   Physical Exam:   Vitals:   Temp:  [36.2 °C (97.1 °F)-36.7 °C (98.1 °F)] 36.2 °C (97.1 °F)  Pulse:  [46-66] 55  Resp:  [18-20] 18  BP: ()/(48-78) 107/58  SpO2:  [90 %-97 %] 93 %    Physical Exam  Constitutional:       General: He is not in acute distress.  HENT:      Head: Normocephalic and  atraumatic.      Nose: No rhinorrhea.      Mouth/Throat:      Mouth: Mucous membranes are moist.   Eyes:      Extraocular Movements: Extraocular movements intact.      Pupils: Pupils are equal, round, and reactive to light.   Neck:      Musculoskeletal: Normal range of motion.   Cardiovascular:      Rate and Rhythm: Normal rate.      Pulses: Normal pulses.      Heart sounds: No murmur.   Pulmonary:      Effort: Pulmonary effort is normal. No respiratory distress.      Breath sounds: No wheezing.   Abdominal:      General: Abdomen is flat. There is no distension.      Palpations: Abdomen is soft.      Tenderness: There is no abdominal tenderness.   Musculoskeletal: Normal range of motion.         General: No swelling.   Skin:     General: Skin is warm and dry.   Neurological:      General: No focal deficit present.      Mental Status: He is alert and oriented to person, place, and time. Mental status is at baseline.   Psychiatric:         Mood and Affect: Mood normal.         Behavior: Behavior normal.         Quality Measures  Quality-Core Measures   Reviewed items::  EKG reviewed, Labs reviewed and Medications reviewed  Anderson catheter::  No Anderson  DVT prophylaxis pharmacological::  Heparin  Ulcer Prophylaxis::  Yes      Labs:   Recent Labs     06/21/20  0010 06/22/20 0317 06/23/20  0117   WBC 7.1 7.1 9.0   RBC 4.53* 4.61* 4.60*   HEMOGLOBIN 14.1 14.3 14.2   HEMATOCRIT 43.2 44.0 43.7   MCV 95.4 95.4 95.0   MCH 31.1 31.0 30.9   RDW 48.4 47.8 48.0   PLATELETCT 205 205 208   MPV 9.1 9.5 9.7      Recent Labs     06/21/20  0010 06/22/20 0317 06/23/20  0117   SODIUM 138 134* 137   POTASSIUM 4.3 4.4 4.4   CHLORIDE 106 102 104   CO2 25 22 21   GLUCOSE 108* 108* 105*   BUN 11 14 14      Recent Labs     06/21/20  0010 06/22/20 0317 06/23/20  0117   ALBUMIN  --  3.6  --    TBILIRUBIN  --  0.4  --    ALKPHOSPHAT  --  63  --    TOTPROTEIN  --  6.2  --    ALTSGPT  --  10  --    ASTSGOT  --  18  --    CREATININE 0.85 0.98 0.94       Lab Results   Component Value Date/Time    PROTHROMBTM 13.4 06/22/2020 03:17 AM    INR 0.99 06/22/2020 03:17 AM         Imaging:   US-CAROTID DOPPLER BILAT   Final Result      US-VEIN MAPPING LOWER EXTREMITY BILAT   Final Result      DX-CHEST-PORTABLE (1 VIEW)   Final Result      Focal opacity projecting over the first costochondral junction on the right. This may be related to asymmetric prominence of the costochondral junction but an underlying mass is not excluded. Further evaluation can be performed with CT chest.         EC-ECHOCARDIOGRAM COMPLETE W/ CONT   Final Result      OUTSIDE IMAGES-CT ABDOMEN /PELVIS   Final Result      OUTSIDE IMAGES-DX CHEST   Final Result      CL-LEFT HEART CATHETERIZATION WITH POSSIBLE INTERVENTION    (Results Pending)        NSTEMI (non-ST elevated myocardial infarction) ([facility])  Assessment & Plan  Patient transferred from outside hospital with complaints of chest pain and elevated troponin.  Started on heparin drip from outside hospital.  Hemodynamically stable on admit.  EKG shows bradycardia and RBBB, no ST segment changes.  Troponin T trended up to 500s.  Repeat EKG suggestive of inferior wall MI.  HEART score 7, DEJUAN 2+. -Echo: Poor study, EF 55%, no regional wall motion noted.  proBNP elevated 1200s.  HbA1c 6.5, lipid panel normal.  Cardiac cath [6/22/2020] remarkable for multivessel disease, EF 45%.  CABG recommended  -Telemetry monitoring  -Continue heparin drip  -Continue ASA, statin  -Continue low-dose metoprolol. No ACEI/ARB/ARNi due to low BP  -Cardiology following  -Pending cardiothoracic surgery evaluation for CABG    Hypertension  Assessment & Plan  -Known history of hypertension.  On losartan at home. low SBP on admit  -Resume medications when appropriate    Parkinson's disease ([facility])  Assessment & Plan  -Known history of Parkinson's disease.  On Sinemet at home.  -Continue home meds    COPD (chronic obstructive pulmonary disease) ([facility])  Assessment & Plan  -Known  history of COPD, on inhalers at home, ran out of medication recently. Not in exacerbation. CXR from outside grossly unremarkable except for right basilar atelectasis.  -RT protocol, oxygen per protocol  -IS      BPH (benign prostatic hyperplasia)  Assessment & Plan  -On finasteride and tamsulosin at home  -Continue home medications    Prostatitis  Assessment & Plan  -History of prostatitis, on fifth week of Bactrim at home.   -Continue Bactrim    Pancreatitis, chronic (HCC)  Assessment & Plan  -History of chronic pancreatitis.  Recent CT abdomen/pelvis from outside negative for pancreatic mass/malignancy.  No formal diagnosis of pancreatic malignancy.  CA-19-9 normal. On enzyme supplementation at home  -Resume enzyme supplements    Type 2 diabetes mellitus (HCC)  Assessment & Plan  -Known history of type 2 diabetes mellitus.  On metformin at home.  HbA1c 6.5   -Maintain euglycemia, blood glucose 140 to 180 mg/dL

## 2020-06-23 NOTE — PROGRESS NOTES
Paitient c/o increasing nausea after zofran administration and pain in the RUQ of abdomen that is increasing in intensity. MD up to bedside to evaluate.

## 2020-06-23 NOTE — THERAPY
Missed Therapy     Patient Name: Ricky Spangler  Age:  70 y.o., Sex:  male  Medical Record #: [id number]  Today's Date: 6/23/2020    Discussed missed therapy with RN       06/23/20 1311   Interdisciplinary Plan of Care Collaboration   IDT Collaboration with  Nursing   Collaboration Comments hold, pt to have CABG on thursday, 6/25. Will see once post op orders are received.

## 2020-06-23 NOTE — PROGRESS NOTES
"3:32am  Received page about patient experiencing abdominal pain and nausea.  Assessed at bedside.    S: \"I'm having another pancreatitis attack\". C/o epigastric pain and nausea X hour, after he had a piece of cake.     O: VS: BP up from 90s/50s to 150/70, afebrile, HR 60s, satting well on RA.     P/E: Epigastric tenderness present, no rebound or guarding. Bowel sounds present.   Cardiac cath site: no erythema, swelling, or tenderness, no bruit on auscultation. No evidence of distal neurovascular deficits.     A/P: ?Acute on chronic pancreatitis  Per patient he has had >10 episodes of pancreatitis, and this feels similar. He generally goes to ER for these episodes, received IV fluids, dilaudid and zofran. Says was unable to tolerate morphine previously.  Recent CT abdomen/pelvis from outside negative for pancreatic mass/malignancy. CA-19-9 normal. On Pancrelipase.   No leukocytosis on today's labs. Triglyceride 6/20/20 was 100.    -will check lipase and Lactic acid level  -will start patient on IV fluids.  -Pain and nausea management  -Keeping NPO for now  -Monitor for cholesterol embolization syndrome signs/symptoms  "

## 2020-06-23 NOTE — CARE PLAN
Problem: Knowledge Deficit  Goal: Knowledge of disease process/condition, treatment plan, diagnostic tests, and medications will improve  Outcome: PROGRESSING AS EXPECTED    Patient is educated of disease process and condition. Treatment team has included patient in plan of care. All medications indications and side effects are explained. Patient is encouraged to ask questions. Patient indicates understanding. Patient is now aware that he does not have pancreatitis. Patient understands pain is related to angina.     Problem: Respiratory:  Goal: Respiratory status will improve  Outcome: PROGRESSING AS EXPECTED     Patient respiratory status assessed. Patient educated on importance of coughing and deep breathing. Deep breaths are encouraged. Educated on how ambulation and movement can affect respiratory status. Patient indicates understanding. Provided patient with IS and was educated in how to use it. Demonstrated that he understood.

## 2020-06-23 NOTE — NON-PROVIDER
Daily Progress Note:     Date of Service: 6/23/2020  Primary Team: [facility] and [facility]   Attending: Lexa Arnold M.D.   Senior Resident: Dr. Campbell  Intern: Dr. Suarez  Contact:  [phone]    Chief Complaint:   Chest pain     Subjective: 69-year-old male transferred from outside hospital for further management of NSTEMI.  Receiving heparin drip.    Patient tolerated left heart cath yesterday well. He had an episode of epigastric pain and nausea last night that felt similar to past episodes. He was given IV fluids, nausea and pain management, and placed NPO. Pain and nausea have now resolved. No chest pain, fevers, chills, or dyspnea.     Consultants/Specialty:  Cardiology    Review of Systems:    Review of Systems   Constitutional: Negative for chills and fever.   HENT: Negative for congestion and sore throat.    Eyes: Negative for blurred vision.   Respiratory: Negative for cough and shortness of breath.    Cardiovascular: Negative for chest pain, orthopnea and leg swelling.   Gastrointestinal: Positive for abdominal pain and nausea. Negative for constipation, diarrhea and heartburn.   Genitourinary: Negative for dysuria and urgency.   Musculoskeletal: Positive for joint pain.   Neurological: Negative for dizziness and headaches.       Objective Data:   Physical Exam:   Vitals:   Temp:  [36.2 °C (97.1 °F)-36.7 °C (98.1 °F)] 36.3 °C (97.3 °F)  Pulse:  [46-71] 66  Resp:  [18-20] 20  BP: ()/(48-78) 156/78  SpO2:  [90 %-97 %] 92 %     Physical Exam  Constitutional:       General: He is not in acute distress.  HENT:      Head: Normocephalic and atraumatic.      Mouth/Throat:      Mouth: Mucous membranes are moist.   Eyes:      Extraocular Movements: Extraocular movements intact.      Conjunctiva/sclera: Conjunctivae normal.   Neck:      Musculoskeletal: Normal range of motion. No neck rigidity.   Cardiovascular:      Rate and Rhythm: Normal rate and regular rhythm.      Pulses: Normal  pulses.      Heart sounds: No murmur. No friction rub. No gallop.    Pulmonary:      Effort: Pulmonary effort is normal.      Breath sounds: No wheezing, rhonchi or rales.   Abdominal:      General: Abdomen is flat.      Palpations: Abdomen is soft.      Tenderness: There is no abdominal tenderness.   Musculoskeletal: Normal range of motion.   Skin:     General: Skin is warm and dry.      Capillary Refill: Capillary refill takes less than 2 seconds.   Neurological:      General: No focal deficit present.      Mental Status: He is alert and oriented to person, place, and time.      Cranial Nerves: No cranial nerve deficit.           Labs:   Results for MAX SALAS (MRN [id number]) as of 6/23/2020 06:35   Ref. Range 6/23/2020 04:05   Lipase Latest Ref Range: 11 - 82 U/L 19   Lactic Acid Latest Ref Range: 0.5 - 2.0 mmol/L 0.9     Results for MAX SALAS (MRN [id number]) as of 6/23/2020 06:35   Ref. Range 6/23/2020 01:17   WBC Latest Ref Range: 4.8 - 10.8 K/uL 9.0   RBC Latest Ref Range: 4.70 - 6.10 M/uL 4.60 (L)   Hemoglobin Latest Ref Range: 14.0 - 18.0 g/dL 14.2   Hematocrit Latest Ref Range: 42.0 - 52.0 % 43.7   MCV Latest Ref Range: 81.4 - 97.8 fL 95.0   MCH Latest Ref Range: 27.0 - 33.0 pg 30.9   MCHC Latest Ref Range: 33.7 - 35.3 g/dL 32.5 (L)   RDW Latest Ref Range: 35.9 - 50.0 fL 48.0   Platelet Count Latest Ref Range: 164 - 446 K/uL 208   MPV Latest Ref Range: 9.0 - 12.9 fL 9.7   Sodium Latest Ref Range: 135 - 145 mmol/L 137   Potassium Latest Ref Range: 3.6 - 5.5 mmol/L 4.4   Chloride Latest Ref Range: 96 - 112 mmol/L 104   Co2 Latest Ref Range: 20 - 33 mmol/L 21   Anion Gap Latest Ref Range: 7.0 - 16.0  12.0   Glucose Latest Ref Range: 65 - 99 mg/dL 105 (H)   Bun Latest Ref Range: 8 - 22 mg/dL 14   Creatinine Latest Ref Range: 0.50 - 1.40 mg/dL 0.94   GFR If  Latest Ref Range: >60 mL/min/1.73 m 2 >60   GFR If Non  Latest Ref Range: >60 mL/min/1.73 m 2 >60   Calcium  Latest Ref Range: 8.5 - 10.5 mg/dL 8.8       Imaging:   Selective coronary angiography/ Left heart cath findings   Hemodynamics: Aorta: 130/75 mmHg  LV: 130/21 mmHg     Coronary Anatomy              Left Main: Dsital 80% stenosis              LAD: Proximal ectasia with extrinsic calcification and no significant stenosis              LCx: osital 30% stenosis              RCA: Dominant, 30% stenosis proximally. Distally there is a non-occlusive thrombus. The PDA has a mid thrombus, non-occlusive. The PLB is normal.                 Left Ventriculography: LVEF= 45%. The basal and mid inferior walls are hypokinetic. No MR, normal root.     NSTEMI:   2 days of typical chest pain and elevated cardiac enzymes. Transferred from [facility], heparin drip initiated prior to transfer. Hemodynamically stable. Troponin trended up to 500s. EKG RBBB, inferior infarct. DEJUAN score 2. HEART score 7. proBNP elevated 1200s. Echo showed no regional wall motion abnormality, EF 55%. HbA1C 6.5, lipid studies normal. Selective coronary angiographgy and left heart catheterization on 06/22/20.     - Continue heparin  - Telemetry monitoring  - ASA and clopidogrel   - High dose statin  - Lose dose metoprolol   - Cardiology following- consult for CABG     HTN:  History of chronic hypertension. BP 98/59 today.      - Continue metoprolol and losartan when BP will tolerate     DM II:  History of diabetes. Kidney function stable. HbA1C 6.5%     -Continue home metformin after PCI  -Monitor kidney function     Prostatitis:  Prostatitis. On 5th week of Bactrim.     -Finish course of Bactrim     Parkinson's:  Stable.      -Continue home carbidopa-levodopa     Chronic pancreatitis  Chronic pancreatitis. No evidence of mass on recent CT abdomen. CA-19-9 normal levels.      -Continue pancrealipase  -Follow up outpatient     COPD  Non-smoker. Has not used albuterol and Duo-neb for some time. CXR shows some basilar atelectasis.      -Pulse ox  -Incentive  spirometer  -Early mobilization      BPH:  History of BPH. Stable.     - Continue home tamsulosin and finasteride

## 2020-06-23 NOTE — PROGRESS NOTES
Received Bedside report. Assumed care at 0700. This pt is AOx4, ambulatory with SBA, voiding adequately, reports no pain. Patient and RN discussed plan of care: questions answered. Labs noted, assessment complete, patient tolerating clear liquid diet. Tele box in place. Pt is on 2L of O2 via NC. Call light in place, fall precautions in place, patient educated on importance of calling for assistance. No additional needs at this time. VSS

## 2020-06-24 ENCOUNTER — APPOINTMENT (OUTPATIENT)
Dept: RADIOLOGY | Facility: MEDICAL CENTER | Age: 70
DRG: 233 | End: 2020-06-24
Attending: STUDENT IN AN ORGANIZED HEALTH CARE EDUCATION/TRAINING PROGRAM
Payer: MEDICARE

## 2020-06-24 ENCOUNTER — APPOINTMENT (OUTPATIENT)
Dept: RADIOLOGY | Facility: MEDICAL CENTER | Age: 70
DRG: 233 | End: 2020-06-24
Attending: PHYSICIAN ASSISTANT
Payer: MEDICARE

## 2020-06-24 LAB
ABO + RH BLD: NORMAL
ABO GROUP BLD: NORMAL
ANION GAP SERPL CALC-SCNC: 8 MMOL/L (ref 7–16)
APTT PPP: 57.1 SEC (ref 24.7–36)
BLD GP AB SCN SERPL QL: NORMAL
BUN SERPL-MCNC: 10 MG/DL (ref 8–22)
CALCIUM SERPL-MCNC: 8.8 MG/DL (ref 8.5–10.5)
CHLORIDE SERPL-SCNC: 103 MMOL/L (ref 96–112)
CO2 SERPL-SCNC: 24 MMOL/L (ref 20–33)
CREAT SERPL-MCNC: 1.16 MG/DL (ref 0.5–1.4)
EKG IMPRESSION: NORMAL
GLUCOSE SERPL-MCNC: 112 MG/DL (ref 65–99)
MAGNESIUM SERPL-MCNC: 1.9 MG/DL (ref 1.5–2.5)
POTASSIUM SERPL-SCNC: 4.3 MMOL/L (ref 3.6–5.5)
RH BLD: NORMAL
SODIUM SERPL-SCNC: 135 MMOL/L (ref 135–145)

## 2020-06-24 PROCEDURE — 86850 RBC ANTIBODY SCREEN: CPT

## 2020-06-24 PROCEDURE — 700111 HCHG RX REV CODE 636 W/ 250 OVERRIDE (IP): Performed by: STUDENT IN AN ORGANIZED HEALTH CARE EDUCATION/TRAINING PROGRAM

## 2020-06-24 PROCEDURE — 71250 CT THORAX DX C-: CPT

## 2020-06-24 PROCEDURE — 94760 N-INVAS EAR/PLS OXIMETRY 1: CPT

## 2020-06-24 PROCEDURE — A9270 NON-COVERED ITEM OR SERVICE: HCPCS | Performed by: INTERNAL MEDICINE

## 2020-06-24 PROCEDURE — 99232 SBSQ HOSP IP/OBS MODERATE 35: CPT | Performed by: INTERNAL MEDICINE

## 2020-06-24 PROCEDURE — 83735 ASSAY OF MAGNESIUM: CPT

## 2020-06-24 PROCEDURE — 85730 THROMBOPLASTIN TIME PARTIAL: CPT

## 2020-06-24 PROCEDURE — 700102 HCHG RX REV CODE 250 W/ 637 OVERRIDE(OP): Performed by: INTERNAL MEDICINE

## 2020-06-24 PROCEDURE — 770022 HCHG ROOM/CARE - ICU (200)

## 2020-06-24 PROCEDURE — 86901 BLOOD TYPING SEROLOGIC RH(D): CPT

## 2020-06-24 PROCEDURE — 700111 HCHG RX REV CODE 636 W/ 250 OVERRIDE (IP): Performed by: EMERGENCY MEDICINE

## 2020-06-24 PROCEDURE — A9270 NON-COVERED ITEM OR SERVICE: HCPCS | Performed by: STUDENT IN AN ORGANIZED HEALTH CARE EDUCATION/TRAINING PROGRAM

## 2020-06-24 PROCEDURE — 93005 ELECTROCARDIOGRAM TRACING: CPT | Performed by: PHYSICIAN ASSISTANT

## 2020-06-24 PROCEDURE — 93010 ELECTROCARDIOGRAM REPORT: CPT | Performed by: INTERNAL MEDICINE

## 2020-06-24 PROCEDURE — 99232 SBSQ HOSP IP/OBS MODERATE 35: CPT | Mod: GC | Performed by: INTERNAL MEDICINE

## 2020-06-24 PROCEDURE — 86900 BLOOD TYPING SEROLOGIC ABO: CPT

## 2020-06-24 PROCEDURE — 700102 HCHG RX REV CODE 250 W/ 637 OVERRIDE(OP): Performed by: STUDENT IN AN ORGANIZED HEALTH CARE EDUCATION/TRAINING PROGRAM

## 2020-06-24 PROCEDURE — 80048 BASIC METABOLIC PNL TOTAL CA: CPT

## 2020-06-24 RX ORDER — ALPRAZOLAM 0.25 MG/1
0.25 TABLET ORAL EVERY 6 HOURS PRN
Status: DISCONTINUED | OUTPATIENT
Start: 2020-06-25 | End: 2020-06-25

## 2020-06-24 RX ORDER — ALPRAZOLAM 0.5 MG/1
.5-1 TABLET ORAL NIGHTLY PRN
Status: DISCONTINUED | OUTPATIENT
Start: 2020-06-24 | End: 2020-06-25

## 2020-06-24 RX ORDER — MAGNESIUM SULFATE 1 G/100ML
1 INJECTION INTRAVENOUS ONCE
Status: COMPLETED | OUTPATIENT
Start: 2020-06-24 | End: 2020-06-24

## 2020-06-24 RX ORDER — GABAPENTIN 300 MG/1
300 CAPSULE ORAL ONCE
Status: COMPLETED | OUTPATIENT
Start: 2020-06-25 | End: 2020-06-25

## 2020-06-24 RX ORDER — ACETAMINOPHEN 500 MG
1000 TABLET ORAL ONCE
Status: COMPLETED | OUTPATIENT
Start: 2020-06-25 | End: 2020-06-25

## 2020-06-24 RX ADMIN — SULFAMETHOXAZOLE AND TRIMETHOPRIM 1 TABLET: 800; 160 TABLET ORAL at 17:04

## 2020-06-24 RX ADMIN — CARBIDOPA AND LEVODOPA 1 TABLET: 25; 100 TABLET ORAL at 14:24

## 2020-06-24 RX ADMIN — TAMSULOSIN HYDROCHLORIDE 0.4 MG: 0.4 CAPSULE ORAL at 06:57

## 2020-06-24 RX ADMIN — PAROXETINE HYDROCHLORIDE 40 MG: 20 TABLET, FILM COATED ORAL at 06:56

## 2020-06-24 RX ADMIN — PANCRELIPASE 6000 UNITS: 30000; 6000; 19000 CAPSULE, DELAYED RELEASE PELLETS ORAL at 13:47

## 2020-06-24 RX ADMIN — METOPROLOL TARTRATE 12.5 MG: 25 TABLET, FILM COATED ORAL at 08:05

## 2020-06-24 RX ADMIN — PANCRELIPASE 6000 UNITS: 30000; 6000; 19000 CAPSULE, DELAYED RELEASE PELLETS ORAL at 08:03

## 2020-06-24 RX ADMIN — ALPRAZOLAM 1 MG: 0.5 TABLET ORAL at 21:06

## 2020-06-24 RX ADMIN — MAGNESIUM SULFATE 1 G: 1 INJECTION INTRAVENOUS at 07:11

## 2020-06-24 RX ADMIN — ATORVASTATIN CALCIUM 80 MG: 80 TABLET, FILM COATED ORAL at 17:05

## 2020-06-24 RX ADMIN — METOPROLOL TARTRATE 12.5 MG: 25 TABLET, FILM COATED ORAL at 17:05

## 2020-06-24 RX ADMIN — ASPIRIN 81 MG: 81 TABLET, COATED ORAL at 06:56

## 2020-06-24 RX ADMIN — SULFAMETHOXAZOLE AND TRIMETHOPRIM 1 TABLET: 800; 160 TABLET ORAL at 06:56

## 2020-06-24 RX ADMIN — PANCRELIPASE 6000 UNITS: 30000; 6000; 19000 CAPSULE, DELAYED RELEASE PELLETS ORAL at 17:05

## 2020-06-24 RX ADMIN — CARBIDOPA AND LEVODOPA 1 TABLET: 25; 100 TABLET ORAL at 06:56

## 2020-06-24 RX ADMIN — OMEPRAZOLE 20 MG: 20 CAPSULE, DELAYED RELEASE ORAL at 06:57

## 2020-06-24 RX ADMIN — CARBIDOPA AND LEVODOPA 1 TABLET: 25; 100 TABLET ORAL at 17:05

## 2020-06-24 RX ADMIN — HEPARIN SODIUM 1000 UNITS/HR: 5000 INJECTION, SOLUTION INTRAVENOUS at 15:33

## 2020-06-24 RX ADMIN — VALACYCLOVIR HYDROCHLORIDE 1000 MG: 500 TABLET, FILM COATED ORAL at 06:56

## 2020-06-24 RX ADMIN — DOCUSATE SODIUM 50 MG AND SENNOSIDES 8.6 MG 2 TABLET: 8.6; 5 TABLET, FILM COATED ORAL at 17:05

## 2020-06-24 RX ADMIN — FINASTERIDE 5 MG: 5 TABLET, FILM COATED ORAL at 06:57

## 2020-06-24 ASSESSMENT — ENCOUNTER SYMPTOMS
BACK PAIN: 0
ORTHOPNEA: 0
DIARRHEA: 0
SORE THROAT: 0
BLURRED VISION: 0
WEAKNESS: 0
HEADACHES: 0
ABDOMINAL PAIN: 0
SHORTNESS OF BREATH: 0
DEPRESSION: 0
PALPITATIONS: 0
CHILLS: 0
FEVER: 0
NAUSEA: 0
VOMITING: 0
WHEEZING: 0
COUGH: 0
DIZZINESS: 0
DOUBLE VISION: 0
CHEST TIGHTNESS: 0
CONSTIPATION: 0
FOCAL WEAKNESS: 0

## 2020-06-24 ASSESSMENT — FIBROSIS 4 INDEX: FIB4 SCORE: 1.92

## 2020-06-24 NOTE — PROGRESS NOTES
Assumed care of pt. Bedside report received from day R.N. Pt denies chest pain or SOB. VSS. Pt resting comfortably. All needs met. Fall precautions in place. Bed low and locked, call light in reach. Discussed POC.

## 2020-06-24 NOTE — PROGRESS NOTES
Assumed care of patient at 0700. Received bedside report. Patient resting in bed, on RA. Tele box in place. No signs of distress. Will continue to monitor.

## 2020-06-24 NOTE — NON-PROVIDER
Daily Progress Note:     Date of Service: 6/24/2020  Primary Team: [facility] and [facility]   Attending: Lexa Arnold M.D.   Senior Resident: Dr. Campbell   Intern: Dr. Suarez  Contact:  [phone]    Chief Complaint:   Chest pain    Subjective: 69-year-old male transferred from outside hospital for further management of NSTEMI.  Receiving heparin drip. Cardiac cath remarkable for multivessel disease and reduced EF 45%. Cardiology recommends CABG. CABG x2-3 with EVH and BRODY is scheduled for June 25 at 1:30 AM at [facility].     No acute events overnight. He has not had any more episodes of epigastric abdominal pain or nausea. No fevers, chills, chest pain, dyspnea, or abdominal pain.     Consultants/Specialty:  Cardiology  CT Surgery    Review of Systems:    Review of Systems   Constitutional: Negative for chills and fever.   HENT: Negative for congestion and sore throat.    Eyes: Negative for blurred vision and double vision.   Respiratory: Negative for cough and shortness of breath.    Cardiovascular: Negative for palpitations and leg swelling.   Gastrointestinal: Negative for abdominal pain, constipation, diarrhea, nausea and vomiting.   Genitourinary: Negative for dysuria and urgency.   Musculoskeletal: Positive for joint pain.   Skin: Negative for itching and rash.   Neurological: Negative for dizziness, weakness and headaches.       Objective Data:   Physical Exam:   Vitals:   Temp:  [36.1 °C (96.9 °F)-36.3 °C (97.3 °F)] 36.2 °C (97.2 °F)  Pulse:  [50-60] 50  Resp:  [16-18] 16  BP: ()/(46-62) 95/46  SpO2:  [91 %-98 %] 93 %     Physical Exam  Constitutional:       General: He is not in acute distress.     Appearance: Normal appearance.   HENT:      Head: Normocephalic and atraumatic.   Eyes:      Extraocular Movements: Extraocular movements intact.      Conjunctiva/sclera: Conjunctivae normal.   Neck:      Musculoskeletal: Normal range of motion and neck supple.    Cardiovascular:      Rate and Rhythm: Regular rhythm. Bradycardia present.      Pulses: Normal pulses.      Heart sounds: No murmur. No friction rub. No gallop.    Pulmonary:      Effort: Pulmonary effort is normal. No respiratory distress.      Breath sounds: Normal breath sounds. No wheezing, rhonchi or rales.   Abdominal:      General: Abdomen is flat. There is no distension.      Palpations: Abdomen is soft.      Tenderness: There is no abdominal tenderness.   Musculoskeletal: Normal range of motion.      Right lower leg: No edema.      Left lower leg: No edema.   Skin:     General: Skin is warm and dry.      Capillary Refill: Capillary refill takes less than 2 seconds.      Comments: No erythema or drainage surrounding incision site.    Neurological:      General: No focal deficit present.      Mental Status: He is alert.      Cranial Nerves: No cranial nerve deficit.           Labs:   Results for MAX SALAS (MRN [id number]) as of 2020 06:39   Ref. Range 2020 04:04   Sodium Latest Ref Range: 135 - 145 mmol/L 135   Potassium Latest Ref Range: 3.6 - 5.5 mmol/L 4.3   Chloride Latest Ref Range: 96 - 112 mmol/L 103   Co2 Latest Ref Range: 20 - 33 mmol/L 24   Anion Gap Latest Ref Range: 7.0 - 16.0  8.0   Glucose Latest Ref Range: 65 - 99 mg/dL 112 (H)   Bun Latest Ref Range: 8 - 22 mg/dL 10   Creatinine Latest Ref Range: 0.50 - 1.40 mg/dL 1.16   GFR If  Latest Ref Range: >60 mL/min/1.73 m 2 >60   GFR If Non  Latest Ref Range: >60 mL/min/1.73 m 2 >60   Calcium Latest Ref Range: 8.5 - 10.5 mg/dL 8.8   Magnesium Latest Ref Range: 1.5 - 2.5 mg/dL 1.9   APTT Latest Ref Range: 24.7 - 36.0 sec 57.1 (H)     Imagin2020 7:58 AM     HISTORY/REASON FOR EXAM:  Chest Pain.        TECHNIQUE/EXAM DESCRIPTION AND NUMBER OF VIEWS:  Single portable view of the chest.     COMPARISON: 2020     FINDINGS:     The heart is not enlarged. No pleural effusion or pneumothorax is  seen. There is an opacity projecting over the first costochondral junction on the right. Costophrenic angles are clear.     IMPRESSION:     Focal opacity projecting over the first costochondral junction on the right. This may be related to asymmetric prominence of the costochondral junction but an underlying mass is not excluded. Further evaluation can be performed with CT chest.      NSTEMI:   2 days of typical chest pain and elevated cardiac enzymes. Transferred from [facility], heparin drip initiated prior to transfer. Hemodynamically stable. Troponin trended up to 500s. EKG RBBB, inferior infarct. DEJUAN score 2. HEART score 7. proBNP elevated 1200s. Echo showed no regional wall motion abnormality, EF 55%. HbA1C 6.5, lipid studies normal. Selective coronary angiographgy and left heart catheterization on 06/22/20.     - Continue heparin  - Telemetry monitoring  - ASA and clopidogrel   - High dose statin  - Lose dose metoprolol  - No ACEI/ARB/ARNi due to low BP   - Cardiology following  - CABG tomorrow     HTN:  History of chronic hypertension. BP 95/46 today.      - Continue metoprolol and losartan when BP will tolerate     DM II:  History of diabetes. Kidney function stable. HbA1C 6.5%     - Maintain euglycemia, Blood glucose 140 to 180 mg/dL  -Monitor kidney function     Prostatitis:  Prostatitis. On 5th week of Bactrim.     -Finish course of Bactrim     Parkinson's:  Stable.      -Continue home carbidopa-levodopa     Chronic pancreatitis  Chronic pancreatitis. No evidence of mass on recent CT abdomen. CA-19-9 normal levels.      -Continue pancrealipase  -Follow up outpatient     COPD  Non-smoker. Has not used albuterol and Duo-neb for some time. CXR shows some basilar atelectasis and focal opacity over the right first costochondral junction.      -Pulse ox  -Incentive spirometer  -Early mobilization  -CT chest      BPH:  History of BPH. Stable.     - Continue home tamsulosin and finasteride

## 2020-06-24 NOTE — CARE PLAN
Problem: Infection  Goal: Will remain free from infection  Outcome: PROGRESSING AS EXPECTED   Patient WBC within normal limits. No open wounds noted on patient. Patient does not complain of SOB. Patient vital signs are stable.      Problem: Venous Thromboembolism (VTW)/Deep Vein Thrombosis (DVT) Prevention:  Goal: Patient will participate in Venous Thrombosis (VTE)/Deep Vein Thrombosis (DVT)Prevention Measures  Outcome: PROGRESSING AS EXPECTED   Patient educated on DVT risks. Patient VS are stable. No signs of DVT. Will continue to monitor for change status. Currently on a heparin gtt.

## 2020-06-24 NOTE — PROGRESS NOTES
Cardiology Follow Up Progress Note    Date of Service  6/24/2020    Attending Physician  Lexa Arnold M.D.    ENEIDA Spangler is a 70 y.o. male admitted 6/20/2020 with acute coronary syndrome.    Interim Events  6/23: No cardiac symptoms.  Tolerated cardiac catheterization yesterday.  6/24: No cardiac symptoms.  No arrhythmias.    Review of Systems  Review of Systems   Respiratory: Negative for chest tightness and shortness of breath.    Cardiovascular: Negative for chest pain and palpitations.   Gastrointestinal: Negative for abdominal pain and nausea.   Neurological: Negative for dizziness and headaches.       Vital signs in last 24 hours  Temp:  [36.1 °C (96.9 °F)-36.3 °C (97.3 °F)] 36.1 °C (97 °F)  Pulse:  [50-63] 63  Resp:  [16-18] 16  BP: ()/(46-68) 121/68  SpO2:  [91 %-98 %] 93 %    Physical Exam  Physical Exam  Constitutional:       General: He is not in acute distress.  Cardiovascular:      Rate and Rhythm: Normal rate and regular rhythm.      Heart sounds: Normal heart sounds, S1 normal and S2 normal. No murmur. No friction rub. No gallop.    Pulmonary:      Effort: Pulmonary effort is normal.      Breath sounds: Normal breath sounds. No wheezing, rhonchi or rales.   Musculoskeletal:      Right lower leg: No edema.      Left lower leg: No edema.   Skin:     General: Skin is warm and dry.   Neurological:      Mental Status: He is alert and oriented to person, place, and time.   Psychiatric:         Behavior: Behavior normal.         Lab Review  Lab Results   Component Value Date/Time    WBC 9.0 06/23/2020 01:17 AM    RBC 4.60 (L) 06/23/2020 01:17 AM    HEMOGLOBIN 14.2 06/23/2020 01:17 AM    HEMATOCRIT 43.7 06/23/2020 01:17 AM    MCV 95.0 06/23/2020 01:17 AM    MCH 30.9 06/23/2020 01:17 AM    MCHC 32.5 (L) 06/23/2020 01:17 AM    MPV 9.7 06/23/2020 01:17 AM      Lab Results   Component Value Date/Time    SODIUM 135 06/24/2020 04:04 AM    POTASSIUM 4.3 06/24/2020 04:04 AM    CHLORIDE 103  06/24/2020 04:04 AM    CO2 24 06/24/2020 04:04 AM    GLUCOSE 112 (H) 06/24/2020 04:04 AM    BUN 10 06/24/2020 04:04 AM    CREATININE 1.16 06/24/2020 04:04 AM      Lab Results   Component Value Date/Time    ASTSGOT 18 06/22/2020 03:17 AM    ALTSGPT 10 06/22/2020 03:17 AM     Lab Results   Component Value Date/Time    CHOLSTRLTOT 138 06/20/2020 02:25 PM    LDL 77 06/20/2020 02:25 PM    HDL 41 06/20/2020 02:25 PM    TRIGLYCERIDE 100 06/20/2020 02:25 PM    TROPONINT 455 (H) 06/21/2020 08:38 AM       No results for input(s): NTPROBNP in the last 72 hours.    Cardiac Imaging and Procedures Review  Rhythm:  My personal interpretation of the rhythm dated 6/24 is normal sinus rhythm    Echocardiogram: 06/22/2020  No prior study is available for comparison.   Technically difficult study - adequate information is obtained.   Normal left ventricular chamber size.  Mild concentric left ventricular hypertrophy.  Normal left ventricular systolic function.    Cardiac Catheterization: 06/22/2020  1. Acute NSTEMI vs recent IMI  2. Severe LM and RCA disease  3. Mildly impaired LVEF  4. Moderate elevation in LVEDP    Assessment/Plan  1.  NSTEMI.  2.  CAD, complex with left main artery and right coronary artery disease.  3.  Diabetes mellitus.  4.  Dyslipidemia.  5.  Right colectomy for colon cancer.  6.  Parkinson's disease.  7.  Chronic prostatism.  8.  RBBB.  9.  Chronic pancreatic insufficiency.    Recommendation Discussion  1.  The patient remained stable from a cardiac standpoint.  2.  Continue current cardiac therapy including IV heparin.  3.  Scheduled for CABG in a.m.  4.  Cardiology will sign off.  5.  Discussed with patient and bedside RN.    Thank you for allowing me to participate in the care of this patient.    Please contact me with any questions.    Osmani Hirsch M.D.   Cardiologist, [facility]  [phone] - [phone]

## 2020-06-24 NOTE — PROGRESS NOTES
Daily Progress Note:     Date of Service: 6/24/2020  Primary Team: [facility] IM Blue Team   Attending: Lexa Arnold M.D.   Senior Resident: Dr. Campbell  Intern: Dr. Suarez  Contact:  [phone]    Chief Complaint:   Chest pain    ID: 69-year-old male transferred from outside hospital for further management of NSTEMI.  Receiving heparin drip.  Cardiac cath remarkable for multivessel disease and reduced EF 45%.  Recommended CABG, pending CT surgery assessment  NB: Had unclear documentation of CA pancreas/CA prostate on admit, on further chart review no formal diagnosis seen. CT abdomen/pelvis done outside June 2020: Negative for pancreatic mass/malignancy/ Ca prostate.  CA-19-9 normal.     Subjective:   -No acute events overnight. Remains stable at baseline health. Receiving heparin drip  -Denies chest pain, SOB, palpitations  -No abdominal pain/N/V  -Planned for CABG tomorrow     CXR shows right first costochondral junction opacity. CT chest for further evaluation pending    Consultants/Specialty:  Cardiology  Cardio Thoracic surgery    Review of Systems:    Review of Systems   Constitutional: Negative for chills and fever.   HENT: Negative for ear pain.    Eyes: Negative for double vision.   Respiratory: Negative for cough, shortness of breath and wheezing.    Cardiovascular: Negative for chest pain, palpitations, orthopnea and leg swelling.   Gastrointestinal: Negative for abdominal pain, diarrhea, nausea and vomiting.   Genitourinary: Negative for dysuria, frequency and hematuria.   Musculoskeletal: Negative for back pain.   Neurological: Negative for dizziness, focal weakness and headaches.   Psychiatric/Behavioral: Negative for depression.       Objective Data:   Physical Exam:   Vitals:   Temp:  [36.1 °C (96.9 °F)-36.3 °C (97.3 °F)] 36.3 °C (97.3 °F)  Pulse:  [50-65] 65  Resp:  [12-25] 16  BP: ()/(46-68) 138/53  SpO2:  [91 %-98 %] 95 %    Physical Exam  Constitutional:       General: He is not in acute  distress.  HENT:      Head: Normocephalic and atraumatic.      Nose: No rhinorrhea.      Mouth/Throat:      Mouth: Mucous membranes are moist.   Eyes:      Extraocular Movements: Extraocular movements intact.      Pupils: Pupils are equal, round, and reactive to light.   Neck:      Musculoskeletal: Normal range of motion.   Cardiovascular:      Rate and Rhythm: Normal rate.      Pulses: Normal pulses.      Heart sounds: No murmur.   Pulmonary:      Effort: Pulmonary effort is normal. No respiratory distress.      Breath sounds: No wheezing.   Abdominal:      General: Abdomen is flat. There is no distension.      Palpations: Abdomen is soft.      Tenderness: There is no abdominal tenderness.   Musculoskeletal: Normal range of motion.         General: No swelling.   Skin:     General: Skin is warm and dry.   Neurological:      General: No focal deficit present.      Mental Status: He is alert and oriented to person, place, and time. Mental status is at baseline.   Psychiatric:         Mood and Affect: Mood normal.         Behavior: Behavior normal.         Quality Measures  Quality-Core Measures   Reviewed items::  EKG reviewed, Labs reviewed and Medications reviewed  Anderson catheter::  No Anderson  DVT prophylaxis pharmacological::  Heparin  Ulcer Prophylaxis::  Yes      Labs:   Recent Labs     06/22/20 0317 06/23/20  0117   WBC 7.1 9.0   RBC 4.61* 4.60*   HEMOGLOBIN 14.3 14.2   HEMATOCRIT 44.0 43.7   MCV 95.4 95.0   MCH 31.0 30.9   RDW 47.8 48.0   PLATELETCT 205 208   MPV 9.5 9.7      Recent Labs     06/22/20 0317 06/23/20  0117 06/24/20  0404   SODIUM 134* 137 135   POTASSIUM 4.4 4.4 4.3   CHLORIDE 102 104 103   CO2 22 21 24   GLUCOSE 108* 105* 112*   BUN 14 14 10      Recent Labs     06/22/20 0317 06/23/20  0117 06/24/20  0404   ALBUMIN 3.6  --   --    TBILIRUBIN 0.4  --   --    ALKPHOSPHAT 63  --   --    TOTPROTEIN 6.2  --   --    ALTSGPT 10  --   --    ASTSGOT 18  --   --    CREATININE 0.98 0.94 1.16      Lab  Results   Component Value Date/Time    PROTHROMBTM 13.4 06/22/2020 03:17 AM    INR 0.99 06/22/2020 03:17 AM         Imaging:   CT-CHEST (THORAX) W/O   Final Result         1. Opacity seen on the recent radiograph represents bony proliferation at the right first costosternal junction. No suspicious pulmonary nodules or masses.   2. Atherosclerosis and coronary calcifications.   3. Colonic diverticulosis.      US-CAROTID DOPPLER BILAT   Final Result      US-VEIN MAPPING LOWER EXTREMITY BILAT   Final Result      DX-CHEST-PORTABLE (1 VIEW)   Final Result      Focal opacity projecting over the first costochondral junction on the right. This may be related to asymmetric prominence of the costochondral junction but an underlying mass is not excluded. Further evaluation can be performed with CT chest.         EC-ECHOCARDIOGRAM COMPLETE W/ CONT   Final Result      OUTSIDE IMAGES-CT ABDOMEN /PELVIS   Final Result      OUTSIDE IMAGES-DX CHEST   Final Result      CL-LEFT HEART CATHETERIZATION WITH POSSIBLE INTERVENTION    (Results Pending)        NSTEMI (non-ST elevated myocardial infarction) (HCC)  Assessment & Plan  Patient transferred from outside hospital with complaints of chest pain and elevated troponin.  Started on heparin drip from outside hospital.  Hemodynamically stable on admit.  EKG shows bradycardia and RBBB, no ST segment changes.  Troponin T trended up to 500s.  Repeat EKG suggestive of inferior wall MI.  HEART score 7, DEJUAN 2+. -Echo: Poor study, EF 55%, no regional wall motion noted.  proBNP elevated 1200s.  HbA1c 6.5, lipid panel normal.  Cardiac cath [6/22/2020] remarkable for multivessel disease, EF 45%.  CABG recommended  -Telemetry monitoring  -Continue heparin drip  -Continue ASA, statin  -Continue low-dose metoprolol. No ACEI/ARB/ARNi due to low BP  -Cardiology following  -CABG planned for 6/25/20  -NPO from midnight    Hypertension  Assessment & Plan  -Known history of hypertension.  On losartan at  home. low SBP on admit  -Resume medications when appropriate    Parkinson's disease ([facility])  Assessment & Plan  -Known history of Parkinson's disease.  On Sinemet at home.  -Continue home meds    COPD (chronic obstructive pulmonary disease) ([facility])  Assessment & Plan  -Known history of COPD, on inhalers at home, ran out of medication recently. Not in exacerbation. CXR from outside grossly unremarkable except for right basilar atelectasis.  -RT protocol, oxygen per protocol  -IS      BPH (benign prostatic hyperplasia)  Assessment & Plan  -On finasteride and tamsulosin at home  -Continue home medications    Prostatitis  Assessment & Plan  -History of prostatitis, on fifth week of Bactrim at home.   -Continue Bactrim    Pancreatitis, chronic ([facility])  Assessment & Plan  -History of chronic pancreatitis.  Recent CT abdomen/pelvis from outside negative for pancreatic mass/malignancy.  No formal diagnosis of pancreatic malignancy.  CA-19-9 normal. On enzyme supplementation at home  -Resume enzyme supplements    Type 2 diabetes mellitus ([facility])  Assessment & Plan  -Known history of type 2 diabetes mellitus.  On metformin at home.  HbA1c 6.5   -Maintain euglycemia, blood glucose 140 to 180 mg/dL

## 2020-06-24 NOTE — RESPIRATORY CARE
Oxygen Rounds      Patient found on    O2 L/m:  2  Oxygen device:  snc  Spo2: 93%        Respiratory device skin site inspection completed.

## 2020-06-24 NOTE — PROGRESS NOTES
Came by to do pre op education with patient.  He declined at this time stating that he was just going to sleep.  A packet was left for him to review along with my card.  I will try to call him later today to discuss education if my schedule permits.    I can be reached at [phone] if nursing has questions.

## 2020-06-24 NOTE — PROGRESS NOTES
Patient transferred to CICU for scheduled CABG tomorrow. Family updated. Report given to Magdalena MARIN. All of patient's belongings transferred with patient.

## 2020-06-25 ENCOUNTER — ANESTHESIA EVENT (OUTPATIENT)
Dept: SURGERY | Facility: MEDICAL CENTER | Age: 70
DRG: 233 | End: 2020-06-25
Payer: MEDICARE

## 2020-06-25 ENCOUNTER — ANESTHESIA (OUTPATIENT)
Dept: SURGERY | Facility: MEDICAL CENTER | Age: 70
DRG: 233 | End: 2020-06-25
Payer: MEDICARE

## 2020-06-25 ENCOUNTER — APPOINTMENT (OUTPATIENT)
Dept: RADIOLOGY | Facility: MEDICAL CENTER | Age: 70
DRG: 233 | End: 2020-06-25
Attending: THORACIC SURGERY (CARDIOTHORACIC VASCULAR SURGERY)
Payer: MEDICARE

## 2020-06-25 ENCOUNTER — APPOINTMENT (OUTPATIENT)
Dept: CARDIOLOGY | Facility: MEDICAL CENTER | Age: 70
DRG: 233 | End: 2020-06-25
Attending: THORACIC SURGERY (CARDIOTHORACIC VASCULAR SURGERY)
Payer: MEDICARE

## 2020-06-25 LAB
ACT BLD: 109 SEC (ref 74–137)
ACT BLD: 120 SEC (ref 74–137)
ACT BLD: 406 SEC (ref 74–137)
ACT BLD: 433 SEC (ref 74–137)
ACT BLD: 461 SEC (ref 74–137)
ACT BLD: 472 SEC (ref 74–137)
ACT BLD: 510 SEC (ref 74–137)
ACT BLD: 544 SEC (ref 74–137)
ACTION RANGE TRIGGERED IACRT: NO
ACTION RANGE TRIGGERED IACRT: YES
ANION GAP SERPL CALC-SCNC: 10 MMOL/L (ref 7–16)
APTT PPP: 54.5 SEC (ref 24.7–36)
BASE EXCESS BLDA CALC-SCNC: -2 MMOL/L (ref -4–3)
BASE EXCESS BLDA CALC-SCNC: -3 MMOL/L (ref -4–3)
BASE EXCESS BLDA CALC-SCNC: -4 MMOL/L (ref -4–3)
BASE EXCESS BLDA CALC-SCNC: -5 MMOL/L (ref -4–3)
BASE EXCESS BLDA CALC-SCNC: -5 MMOL/L (ref -4–3)
BASE EXCESS BLDA CALC-SCNC: 0 MMOL/L (ref -4–3)
BASE EXCESS BLDA CALC-SCNC: 0 MMOL/L (ref -4–3)
BASE EXCESS BLDV CALC-SCNC: -2 MMOL/L (ref -4–3)
BASOPHILS # BLD AUTO: 0.9 % (ref 0–1.8)
BASOPHILS # BLD: 0.07 K/UL (ref 0–0.12)
BODY TEMPERATURE: ABNORMAL DEGREES
BUN SERPL-MCNC: 14 MG/DL (ref 8–22)
CA-I BLD ISE-SCNC: 0.93 MMOL/L (ref 1.1–1.3)
CA-I BLD ISE-SCNC: 1.03 MMOL/L (ref 1.1–1.3)
CA-I BLD ISE-SCNC: 1.03 MMOL/L (ref 1.1–1.3)
CA-I BLD ISE-SCNC: 1.27 MMOL/L (ref 1.1–1.3)
CA-I BLD ISE-SCNC: 1.27 MMOL/L (ref 1.1–1.3)
CA-I BLD ISE-SCNC: 1.28 MMOL/L (ref 1.1–1.3)
CA-I BLD ISE-SCNC: >2.5 MMOL/L (ref 1.1–1.3)
CALCIUM SERPL-MCNC: 8.9 MG/DL (ref 8.5–10.5)
CHLORIDE SERPL-SCNC: 103 MMOL/L (ref 96–112)
CO2 BLDA-SCNC: 23 MMOL/L (ref 20–33)
CO2 BLDA-SCNC: 24 MMOL/L (ref 20–33)
CO2 BLDA-SCNC: 25 MMOL/L (ref 20–33)
CO2 BLDA-SCNC: 26 MMOL/L (ref 20–33)
CO2 BLDV-SCNC: 25 MMOL/L (ref 20–33)
CO2 SERPL-SCNC: 24 MMOL/L (ref 20–33)
CREAT SERPL-MCNC: 1.21 MG/DL (ref 0.5–1.4)
EKG IMPRESSION: NORMAL
EOSINOPHIL # BLD AUTO: 0.48 K/UL (ref 0–0.51)
EOSINOPHIL NFR BLD: 5.9 % (ref 0–6.9)
ERYTHROCYTE [DISTWIDTH] IN BLOOD BY AUTOMATED COUNT: 48.8 FL (ref 35.9–50)
EST. AVERAGE GLUCOSE BLD GHB EST-MCNC: 131 MG/DL
GLUCOSE BLD-MCNC: 102 MG/DL (ref 65–99)
GLUCOSE BLD-MCNC: 118 MG/DL (ref 65–99)
GLUCOSE BLD-MCNC: 127 MG/DL (ref 65–99)
GLUCOSE BLD-MCNC: 145 MG/DL (ref 65–99)
GLUCOSE BLD-MCNC: 154 MG/DL (ref 65–99)
GLUCOSE BLD-MCNC: 172 MG/DL (ref 65–99)
GLUCOSE BLD-MCNC: 175 MG/DL (ref 65–99)
GLUCOSE BLD-MCNC: 177 MG/DL (ref 65–99)
GLUCOSE BLD-MCNC: 178 MG/DL (ref 65–99)
GLUCOSE BLD-MCNC: 187 MG/DL (ref 65–99)
GLUCOSE BLD-MCNC: 191 MG/DL (ref 65–99)
GLUCOSE BLD-MCNC: 60 MG/DL (ref 65–99)
GLUCOSE BLD-MCNC: 71 MG/DL (ref 65–99)
GLUCOSE BLD-MCNC: 76 MG/DL (ref 65–99)
GLUCOSE BLD-MCNC: 89 MG/DL (ref 65–99)
GLUCOSE SERPL-MCNC: 110 MG/DL (ref 65–99)
HBA1C MFR BLD: 6.2 % (ref 0–5.6)
HCO3 BLDA-SCNC: 21.7 MMOL/L (ref 17–25)
HCO3 BLDA-SCNC: 22.3 MMOL/L (ref 17–25)
HCO3 BLDA-SCNC: 22.8 MMOL/L (ref 17–25)
HCO3 BLDA-SCNC: 23.1 MMOL/L (ref 17–25)
HCO3 BLDA-SCNC: 23.3 MMOL/L (ref 17–25)
HCO3 BLDA-SCNC: 23.5 MMOL/L (ref 17–25)
HCO3 BLDA-SCNC: 24.1 MMOL/L (ref 17–25)
HCO3 BLDA-SCNC: 24.2 MMOL/L (ref 17–25)
HCO3 BLDA-SCNC: 24.6 MMOL/L (ref 17–25)
HCO3 BLDA-SCNC: 24.7 MMOL/L (ref 17–25)
HCO3 BLDV-SCNC: 23.6 MMOL/L (ref 24–28)
HCT VFR BLD AUTO: 43.5 % (ref 42–52)
HCT VFR BLD CALC: 29 % (ref 42–52)
HCT VFR BLD CALC: 30 % (ref 42–52)
HCT VFR BLD CALC: 32 % (ref 42–52)
HCT VFR BLD CALC: 35 % (ref 42–52)
HCT VFR BLD CALC: 44 % (ref 42–52)
HGB BLD-MCNC: 10.2 G/DL (ref 14–18)
HGB BLD-MCNC: 10.9 G/DL (ref 14–18)
HGB BLD-MCNC: 11.9 G/DL (ref 14–18)
HGB BLD-MCNC: 14.2 G/DL (ref 14–18)
HGB BLD-MCNC: 15 G/DL (ref 14–18)
HGB BLD-MCNC: 9.9 G/DL (ref 14–18)
HOROWITZ INDEX BLDA+IHG-RTO: 136 MM[HG]
HOROWITZ INDEX BLDA+IHG-RTO: 167 MM[HG]
HOROWITZ INDEX BLDA+IHG-RTO: 193 MM[HG]
HOROWITZ INDEX BLDA+IHG-RTO: 206 MM[HG]
HOROWITZ INDEX BLDA+IHG-RTO: 215 MM[HG]
HOROWITZ INDEX BLDA+IHG-RTO: 220 MM[HG]
HOROWITZ INDEX BLDA+IHG-RTO: 316 MM[HG]
HOROWITZ INDEX BLDA+IHG-RTO: 358 MM[HG]
HOROWITZ INDEX BLDA+IHG-RTO: 374 MM[HG]
HOROWITZ INDEX BLDA+IHG-RTO: 434 MM[HG]
HOROWITZ INDEX BLDV+IHG-RTO: 56 MM[HG]
IMM GRANULOCYTES # BLD AUTO: 0.03 K/UL (ref 0–0.11)
IMM GRANULOCYTES NFR BLD AUTO: 0.4 % (ref 0–0.9)
INR PPP: 0.98 (ref 0.87–1.13)
INST. QUALIFIED PATIENT IIQPT: YES
LYMPHOCYTES # BLD AUTO: 2.36 K/UL (ref 1–4.8)
LYMPHOCYTES NFR BLD: 28.9 % (ref 22–41)
MAGNESIUM SERPL-MCNC: 2 MG/DL (ref 1.5–2.5)
MCH RBC QN AUTO: 31.2 PG (ref 27–33)
MCHC RBC AUTO-ENTMCNC: 32.6 G/DL (ref 33.7–35.3)
MCV RBC AUTO: 95.6 FL (ref 81.4–97.8)
MONOCYTES # BLD AUTO: 0.77 K/UL (ref 0–0.85)
MONOCYTES NFR BLD AUTO: 9.4 % (ref 0–13.4)
NEUTROPHILS # BLD AUTO: 4.46 K/UL (ref 1.82–7.42)
NEUTROPHILS NFR BLD: 54.5 % (ref 44–72)
NRBC # BLD AUTO: 0 K/UL
NRBC BLD-RTO: 0 /100 WBC
O2/TOTAL GAS SETTING VFR VENT: 100 %
O2/TOTAL GAS SETTING VFR VENT: 40 %
O2/TOTAL GAS SETTING VFR VENT: 40 %
O2/TOTAL GAS SETTING VFR VENT: 60 %
O2/TOTAL GAS SETTING VFR VENT: 80 %
PCO2 BLDA: 32.3 MMHG (ref 26–37)
PCO2 BLDA: 35.1 MMHG (ref 26–37)
PCO2 BLDA: 37.5 MMHG (ref 26–37)
PCO2 BLDA: 43.2 MMHG (ref 26–37)
PCO2 BLDA: 44.1 MMHG (ref 26–37)
PCO2 BLDA: 45.8 MMHG (ref 26–37)
PCO2 BLDA: 47.8 MMHG (ref 26–37)
PCO2 BLDA: 50.9 MMHG (ref 26–37)
PCO2 BLDA: 51.5 MMHG (ref 26–37)
PCO2 BLDA: 55.8 MMHG (ref 26–37)
PCO2 BLDV: 46 MMHG (ref 41–51)
PCO2 TEMP ADJ BLDA: 32.3 MMHG (ref 26–37)
PCO2 TEMP ADJ BLDA: 35.1 MMHG (ref 26–37)
PCO2 TEMP ADJ BLDA: 37.5 MMHG (ref 26–37)
PCO2 TEMP ADJ BLDA: 42.3 MMHG (ref 26–37)
PCO2 TEMP ADJ BLDA: 43.8 MMHG (ref 26–37)
PCO2 TEMP ADJ BLDA: 45.5 MMHG (ref 26–37)
PCO2 TEMP ADJ BLDA: 45.8 MMHG (ref 26–37)
PCO2 TEMP ADJ BLDA: 47.1 MMHG (ref 26–37)
PCO2 TEMP ADJ BLDA: 51.5 MMHG (ref 26–37)
PCO2 TEMP ADJ BLDA: 52 MMHG (ref 26–37)
PCO2 TEMP ADJ BLDV: 46 MMHG (ref 41–51)
PH BLDA: 7.24 [PH] (ref 7.4–7.5)
PH BLDA: 7.26 [PH] (ref 7.4–7.5)
PH BLDA: 7.28 [PH] (ref 7.4–7.5)
PH BLDA: 7.29 [PH] (ref 7.4–7.5)
PH BLDA: 7.33 [PH] (ref 7.4–7.5)
PH BLDA: 7.34 [PH] (ref 7.4–7.5)
PH BLDA: 7.34 [PH] (ref 7.4–7.5)
PH BLDA: 7.42 [PH] (ref 7.4–7.5)
PH BLDA: 7.43 [PH] (ref 7.4–7.5)
PH BLDA: 7.43 [PH] (ref 7.4–7.5)
PH BLDV: 7.32 [PH] (ref 7.31–7.45)
PH TEMP ADJ BLDA: 7.27 [PH] (ref 7.4–7.5)
PH TEMP ADJ BLDA: 7.28 [PH] (ref 7.4–7.5)
PH TEMP ADJ BLDA: 7.29 [PH] (ref 7.4–7.5)
PH TEMP ADJ BLDA: 7.29 [PH] (ref 7.4–7.5)
PH TEMP ADJ BLDA: 7.33 [PH] (ref 7.4–7.5)
PH TEMP ADJ BLDA: 7.34 [PH] (ref 7.4–7.5)
PH TEMP ADJ BLDA: 7.35 [PH] (ref 7.4–7.5)
PH TEMP ADJ BLDA: 7.42 [PH] (ref 7.4–7.5)
PH TEMP ADJ BLDA: 7.43 [PH] (ref 7.4–7.5)
PH TEMP ADJ BLDA: 7.43 [PH] (ref 7.4–7.5)
PH TEMP ADJ BLDV: 7.32 [PH] (ref 7.31–7.45)
PLATELET # BLD AUTO: 201 K/UL (ref 164–446)
PMV BLD AUTO: 9.9 FL (ref 9–12.9)
PO2 BLDA: 100 MMHG (ref 64–87)
PO2 BLDA: 109 MMHG (ref 64–87)
PO2 BLDA: 206 MMHG (ref 64–87)
PO2 BLDA: 220 MMHG (ref 64–87)
PO2 BLDA: 253 MMHG (ref 64–87)
PO2 BLDA: 286 MMHG (ref 64–87)
PO2 BLDA: 347 MMHG (ref 64–87)
PO2 BLDA: 374 MMHG (ref 64–87)
PO2 BLDA: 77 MMHG (ref 64–87)
PO2 BLDA: 86 MMHG (ref 64–87)
PO2 BLDV: 45 MMHG (ref 25–40)
PO2 TEMP ADJ BLDA: 206 MMHG (ref 64–87)
PO2 TEMP ADJ BLDA: 212 MMHG (ref 64–87)
PO2 TEMP ADJ BLDA: 253 MMHG (ref 64–87)
PO2 TEMP ADJ BLDA: 286 MMHG (ref 64–87)
PO2 TEMP ADJ BLDA: 347 MMHG (ref 64–87)
PO2 TEMP ADJ BLDA: 374 MMHG (ref 64–87)
PO2 TEMP ADJ BLDA: 76 MMHG (ref 64–87)
PO2 TEMP ADJ BLDA: 83 MMHG (ref 64–87)
PO2 TEMP ADJ BLDA: 94 MMHG (ref 64–87)
PO2 TEMP ADJ BLDA: 98 MMHG (ref 64–87)
PO2 TEMP ADJ BLDV: 45 MMHG (ref 25–40)
POTASSIUM BLD-SCNC: 4.3 MMOL/L (ref 3.6–5.5)
POTASSIUM BLD-SCNC: 4.4 MMOL/L (ref 3.6–5.5)
POTASSIUM BLD-SCNC: 4.4 MMOL/L (ref 3.6–5.5)
POTASSIUM BLD-SCNC: 4.5 MMOL/L (ref 3.6–5.5)
POTASSIUM BLD-SCNC: 5.1 MMOL/L (ref 3.6–5.5)
POTASSIUM BLD-SCNC: 5.4 MMOL/L (ref 3.6–5.5)
POTASSIUM BLD-SCNC: 5.6 MMOL/L (ref 3.6–5.5)
POTASSIUM SERPL-SCNC: 4.6 MMOL/L (ref 3.6–5.5)
POTASSIUM SERPL-SCNC: 5.2 MMOL/L (ref 3.6–5.5)
POTASSIUM SERPL-SCNC: 5.4 MMOL/L (ref 3.6–5.5)
PROTHROMBIN TIME: 13.3 SEC (ref 12–14.6)
RBC # BLD AUTO: 4.55 M/UL (ref 4.7–6.1)
SAO2 % BLDA: 100 % (ref 93–99)
SAO2 % BLDA: 94 % (ref 93–99)
SAO2 % BLDA: 96 % (ref 93–99)
SAO2 % BLDA: 97 % (ref 93–99)
SAO2 % BLDA: 97 % (ref 93–99)
SAO2 % BLDV: 77 %
SODIUM BLD-SCNC: 133 MMOL/L (ref 135–145)
SODIUM BLD-SCNC: 133 MMOL/L (ref 135–145)
SODIUM BLD-SCNC: 134 MMOL/L (ref 135–145)
SODIUM BLD-SCNC: 134 MMOL/L (ref 135–145)
SODIUM BLD-SCNC: 137 MMOL/L (ref 135–145)
SODIUM BLD-SCNC: 137 MMOL/L (ref 135–145)
SODIUM BLD-SCNC: 139 MMOL/L (ref 135–145)
SODIUM SERPL-SCNC: 137 MMOL/L (ref 135–145)
SPECIMEN DRAWN FROM PATIENT: ABNORMAL
WBC # BLD AUTO: 8.2 K/UL (ref 4.8–10.8)

## 2020-06-25 PROCEDURE — 83735 ASSAY OF MAGNESIUM: CPT

## 2020-06-25 PROCEDURE — 501445 HCHG STAPLER, SKIN DISP: Performed by: THORACIC SURGERY (CARDIOTHORACIC VASCULAR SURGERY)

## 2020-06-25 PROCEDURE — 503001 HCHG PERFUSION: Performed by: THORACIC SURGERY (CARDIOTHORACIC VASCULAR SURGERY)

## 2020-06-25 PROCEDURE — A9270 NON-COVERED ITEM OR SERVICE: HCPCS | Performed by: STUDENT IN AN ORGANIZED HEALTH CARE EDUCATION/TRAINING PROGRAM

## 2020-06-25 PROCEDURE — 02HV33Z INSERTION OF INFUSION DEVICE INTO SUPERIOR VENA CAVA, PERCUTANEOUS APPROACH: ICD-10-PCS | Performed by: ANESTHESIOLOGY

## 2020-06-25 PROCEDURE — 33533 CABG ARTERIAL SINGLE: CPT | Mod: AS | Performed by: PHYSICIAN ASSISTANT

## 2020-06-25 PROCEDURE — 33508 ENDOSCOPIC VEIN HARVEST: CPT | Performed by: THORACIC SURGERY (CARDIOTHORACIC VASCULAR SURGERY)

## 2020-06-25 PROCEDURE — 93010 ELECTROCARDIOGRAM REPORT: CPT | Performed by: INTERNAL MEDICINE

## 2020-06-25 PROCEDURE — 700111 HCHG RX REV CODE 636 W/ 250 OVERRIDE (IP): Performed by: PHYSICIAN ASSISTANT

## 2020-06-25 PROCEDURE — 500890 HCHG PACK, OPEN HEART: Performed by: THORACIC SURGERY (CARDIOTHORACIC VASCULAR SURGERY)

## 2020-06-25 PROCEDURE — C1729 CATH, DRAINAGE: HCPCS | Performed by: THORACIC SURGERY (CARDIOTHORACIC VASCULAR SURGERY)

## 2020-06-25 PROCEDURE — 82803 BLOOD GASES ANY COMBINATION: CPT | Mod: 91

## 2020-06-25 PROCEDURE — 84295 ASSAY OF SERUM SODIUM: CPT | Mod: 91

## 2020-06-25 PROCEDURE — A6402 STERILE GAUZE <= 16 SQ IN: HCPCS | Performed by: THORACIC SURGERY (CARDIOTHORACIC VASCULAR SURGERY)

## 2020-06-25 PROCEDURE — 33518 CABG ARTERY-VEIN TWO: CPT | Mod: AS | Performed by: PHYSICIAN ASSISTANT

## 2020-06-25 PROCEDURE — 501879 HCHG BONE PUTTY HEMOSTATIC (59): Performed by: THORACIC SURGERY (CARDIOTHORACIC VASCULAR SURGERY)

## 2020-06-25 PROCEDURE — B5181ZA FLUOROSCOPY OF SUPERIOR VENA CAVA USING LOW OSMOLAR CONTRAST, GUIDANCE: ICD-10-PCS | Performed by: ANESTHESIOLOGY

## 2020-06-25 PROCEDURE — 37799 UNLISTED PX VASCULAR SURGERY: CPT

## 2020-06-25 PROCEDURE — 160048 HCHG OR STATISTICAL LEVEL 1-5: Performed by: THORACIC SURGERY (CARDIOTHORACIC VASCULAR SURGERY)

## 2020-06-25 PROCEDURE — 700105 HCHG RX REV CODE 258: Performed by: ANESTHESIOLOGY

## 2020-06-25 PROCEDURE — 160009 HCHG ANES TIME/MIN: Performed by: THORACIC SURGERY (CARDIOTHORACIC VASCULAR SURGERY)

## 2020-06-25 PROCEDURE — 770022 HCHG ROOM/CARE - ICU (200)

## 2020-06-25 PROCEDURE — 503000 HCHG SUTURE, OHS: Performed by: THORACIC SURGERY (CARDIOTHORACIC VASCULAR SURGERY)

## 2020-06-25 PROCEDURE — 83036 HEMOGLOBIN GLYCOSYLATED A1C: CPT

## 2020-06-25 PROCEDURE — 85610 PROTHROMBIN TIME: CPT

## 2020-06-25 PROCEDURE — 500896 HCHG PACK, VASCULAR (FOR ROOM 10): Performed by: THORACIC SURGERY (CARDIOTHORACIC VASCULAR SURGERY)

## 2020-06-25 PROCEDURE — 700111 HCHG RX REV CODE 636 W/ 250 OVERRIDE (IP): Performed by: ANESTHESIOLOGY

## 2020-06-25 PROCEDURE — B24BZZ4 ULTRASONOGRAPHY OF HEART WITH AORTA, TRANSESOPHAGEAL: ICD-10-PCS | Performed by: ANESTHESIOLOGY

## 2020-06-25 PROCEDURE — 99291 CRITICAL CARE FIRST HOUR: CPT | Performed by: INTERNAL MEDICINE

## 2020-06-25 PROCEDURE — 82962 GLUCOSE BLOOD TEST: CPT | Mod: 91

## 2020-06-25 PROCEDURE — C9248 INJ, CLEVIDIPINE BUTYRATE: HCPCS | Performed by: ANESTHESIOLOGY

## 2020-06-25 PROCEDURE — 500734 HCHG INSERT, STEALTH: Performed by: THORACIC SURGERY (CARDIOTHORACIC VASCULAR SURGERY)

## 2020-06-25 PROCEDURE — 85025 COMPLETE CBC W/AUTO DIFF WBC: CPT

## 2020-06-25 PROCEDURE — 700111 HCHG RX REV CODE 636 W/ 250 OVERRIDE (IP)

## 2020-06-25 PROCEDURE — A9270 NON-COVERED ITEM OR SERVICE: HCPCS | Performed by: PHYSICIAN ASSISTANT

## 2020-06-25 PROCEDURE — 5A1221Z PERFORMANCE OF CARDIAC OUTPUT, CONTINUOUS: ICD-10-PCS | Performed by: THORACIC SURGERY (CARDIOTHORACIC VASCULAR SURGERY)

## 2020-06-25 PROCEDURE — 700101 HCHG RX REV CODE 250

## 2020-06-25 PROCEDURE — 700101 HCHG RX REV CODE 250: Performed by: ANESTHESIOLOGY

## 2020-06-25 PROCEDURE — 501745 HCHG WIRE, SURGICAL STEEL: Performed by: THORACIC SURGERY (CARDIOTHORACIC VASCULAR SURGERY)

## 2020-06-25 PROCEDURE — 84132 ASSAY OF SERUM POTASSIUM: CPT | Mod: 91

## 2020-06-25 PROCEDURE — 700101 HCHG RX REV CODE 250: Performed by: PHYSICIAN ASSISTANT

## 2020-06-25 PROCEDURE — 94150 VITAL CAPACITY TEST: CPT

## 2020-06-25 PROCEDURE — 02100Z9 BYPASS CORONARY ARTERY, ONE ARTERY FROM LEFT INTERNAL MAMMARY, OPEN APPROACH: ICD-10-PCS | Performed by: THORACIC SURGERY (CARDIOTHORACIC VASCULAR SURGERY)

## 2020-06-25 PROCEDURE — 82947 ASSAY GLUCOSE BLOOD QUANT: CPT | Mod: 91

## 2020-06-25 PROCEDURE — 110372 HCHG SHELL REV 278: Performed by: THORACIC SURGERY (CARDIOTHORACIC VASCULAR SURGERY)

## 2020-06-25 PROCEDURE — 700102 HCHG RX REV CODE 250 W/ 637 OVERRIDE(OP): Performed by: PHYSICIAN ASSISTANT

## 2020-06-25 PROCEDURE — 500767 HCHG KIT, ENDOSCOPIC VEIN HARVEST: Performed by: THORACIC SURGERY (CARDIOTHORACIC VASCULAR SURGERY)

## 2020-06-25 PROCEDURE — 700102 HCHG RX REV CODE 250 W/ 637 OVERRIDE(OP): Performed by: STUDENT IN AN ORGANIZED HEALTH CARE EDUCATION/TRAINING PROGRAM

## 2020-06-25 PROCEDURE — 700111 HCHG RX REV CODE 636 W/ 250 OVERRIDE (IP): Performed by: THORACIC SURGERY (CARDIOTHORACIC VASCULAR SURGERY)

## 2020-06-25 PROCEDURE — 33518 CABG ARTERY-VEIN TWO: CPT | Performed by: THORACIC SURGERY (CARDIOTHORACIC VASCULAR SURGERY)

## 2020-06-25 PROCEDURE — 85347 COAGULATION TIME ACTIVATED: CPT | Mod: 91

## 2020-06-25 PROCEDURE — 500002 HCHG ADHESIVE, DERMABOND: Performed by: THORACIC SURGERY (CARDIOTHORACIC VASCULAR SURGERY)

## 2020-06-25 PROCEDURE — 85730 THROMBOPLASTIN TIME PARTIAL: CPT

## 2020-06-25 PROCEDURE — 93005 ELECTROCARDIOGRAM TRACING: CPT | Performed by: PHYSICIAN ASSISTANT

## 2020-06-25 PROCEDURE — 33533 CABG ARTERIAL SINGLE: CPT | Performed by: THORACIC SURGERY (CARDIOTHORACIC VASCULAR SURGERY)

## 2020-06-25 PROCEDURE — 82330 ASSAY OF CALCIUM: CPT | Mod: 91

## 2020-06-25 PROCEDURE — C1725 CATH, TRANSLUMIN NON-LASER: HCPCS | Performed by: THORACIC SURGERY (CARDIOTHORACIC VASCULAR SURGERY)

## 2020-06-25 PROCEDURE — 33508 ENDOSCOPIC VEIN HARVEST: CPT | Mod: AS | Performed by: PHYSICIAN ASSISTANT

## 2020-06-25 PROCEDURE — 500359 HCHG DILATORS, PARSONNET (OHS): Performed by: THORACIC SURGERY (CARDIOTHORACIC VASCULAR SURGERY)

## 2020-06-25 PROCEDURE — 06BP4ZZ EXCISION OF RIGHT SAPHENOUS VEIN, PERCUTANEOUS ENDOSCOPIC APPROACH: ICD-10-PCS | Performed by: THORACIC SURGERY (CARDIOTHORACIC VASCULAR SURGERY)

## 2020-06-25 PROCEDURE — 501519 HCHG SUTURE, E PACK: Performed by: THORACIC SURGERY (CARDIOTHORACIC VASCULAR SURGERY)

## 2020-06-25 PROCEDURE — C1898 LEAD, PMKR, OTHER THAN TRANS: HCPCS | Performed by: THORACIC SURGERY (CARDIOTHORACIC VASCULAR SURGERY)

## 2020-06-25 PROCEDURE — 700105 HCHG RX REV CODE 258: Performed by: THORACIC SURGERY (CARDIOTHORACIC VASCULAR SURGERY)

## 2020-06-25 PROCEDURE — 500901 HCHG PACKING, VAG 2 X-RAY: Performed by: THORACIC SURGERY (CARDIOTHORACIC VASCULAR SURGERY)

## 2020-06-25 PROCEDURE — 71045 X-RAY EXAM CHEST 1 VIEW: CPT

## 2020-06-25 PROCEDURE — 94002 VENT MGMT INPAT INIT DAY: CPT

## 2020-06-25 PROCEDURE — 160042 HCHG SURGERY MINUTES - EA ADDL 1 MIN LEVEL 5: Performed by: THORACIC SURGERY (CARDIOTHORACIC VASCULAR SURGERY)

## 2020-06-25 PROCEDURE — 500424 HCHG DRESSING, AIRSTRIP: Performed by: THORACIC SURGERY (CARDIOTHORACIC VASCULAR SURGERY)

## 2020-06-25 PROCEDURE — 93312 ECHO TRANSESOPHAGEAL: CPT

## 2020-06-25 PROCEDURE — 94669 MECHANICAL CHEST WALL OSCILL: CPT

## 2020-06-25 PROCEDURE — 501183 HCHG PUNCH, AORTIC #4: Performed by: THORACIC SURGERY (CARDIOTHORACIC VASCULAR SURGERY)

## 2020-06-25 PROCEDURE — 85014 HEMATOCRIT: CPT | Mod: 91

## 2020-06-25 PROCEDURE — 03HY32Z INSERTION OF MONITORING DEVICE INTO UPPER ARTERY, PERCUTANEOUS APPROACH: ICD-10-PCS | Performed by: ANESTHESIOLOGY

## 2020-06-25 PROCEDURE — 80048 BASIC METABOLIC PNL TOTAL CA: CPT

## 2020-06-25 PROCEDURE — 700102 HCHG RX REV CODE 250 W/ 637 OVERRIDE(OP): Performed by: ANESTHESIOLOGY

## 2020-06-25 PROCEDURE — 160031 HCHG SURGERY MINUTES - 1ST 30 MINS LEVEL 5: Performed by: THORACIC SURGERY (CARDIOTHORACIC VASCULAR SURGERY)

## 2020-06-25 PROCEDURE — 021109W BYPASS CORONARY ARTERY, TWO ARTERIES FROM AORTA WITH AUTOLOGOUS VENOUS TISSUE, OPEN APPROACH: ICD-10-PCS | Performed by: THORACIC SURGERY (CARDIOTHORACIC VASCULAR SURGERY)

## 2020-06-25 DEVICE — BONE PUTTY HEMOSTATIC ABSORBABLE (12/BX): Type: IMPLANTABLE DEVICE | Site: CHEST | Status: FUNCTIONAL

## 2020-06-25 DEVICE — LOCATORS AC VEIN GRAFT (OHS) - 10/BX  SCANLAN #250-1001-83: Type: IMPLANTABLE DEVICE | Site: CHEST | Status: FUNCTIONAL

## 2020-06-25 RX ORDER — PAPAVERINE HYDROCHLORIDE 30 MG/ML
INJECTION INTRAMUSCULAR; INTRAVENOUS
Status: DISCONTINUED | OUTPATIENT
Start: 2020-06-25 | End: 2020-06-25

## 2020-06-25 RX ORDER — MORPHINE SULFATE 10 MG/ML
4 INJECTION, SOLUTION INTRAMUSCULAR; INTRAVENOUS
Status: DISCONTINUED | OUTPATIENT
Start: 2020-06-25 | End: 2020-06-26 | Stop reason: ALTCHOICE

## 2020-06-25 RX ORDER — OXYCODONE HYDROCHLORIDE 5 MG/1
5 TABLET ORAL
Status: DISCONTINUED | OUTPATIENT
Start: 2020-06-25 | End: 2020-06-30

## 2020-06-25 RX ORDER — ACETAMINOPHEN 500 MG
1000 TABLET ORAL EVERY 6 HOURS
Status: DISPENSED | OUTPATIENT
Start: 2020-06-25 | End: 2020-06-30

## 2020-06-25 RX ORDER — ACETAMINOPHEN 650 MG/1
650 SUPPOSITORY RECTAL EVERY 4 HOURS PRN
Status: DISCONTINUED | OUTPATIENT
Start: 2020-06-30 | End: 2020-07-01 | Stop reason: HOSPADM

## 2020-06-25 RX ORDER — CALCIUM CHLORIDE 100 MG/ML
INJECTION INTRAVENOUS; INTRAVENTRICULAR PRN
Status: DISCONTINUED | OUTPATIENT
Start: 2020-06-25 | End: 2020-06-25 | Stop reason: SURG

## 2020-06-25 RX ORDER — TRAMADOL HYDROCHLORIDE 50 MG/1
50 TABLET ORAL EVERY 4 HOURS PRN
Status: DISCONTINUED | OUTPATIENT
Start: 2020-06-25 | End: 2020-06-30

## 2020-06-25 RX ORDER — MAGNESIUM SULFATE 1 G/100ML
1 INJECTION INTRAVENOUS DAILY
Status: COMPLETED | OUTPATIENT
Start: 2020-06-25 | End: 2020-06-27

## 2020-06-25 RX ORDER — GABAPENTIN 300 MG/1
300 CAPSULE ORAL 2 TIMES DAILY
Status: COMPLETED | OUTPATIENT
Start: 2020-06-25 | End: 2020-06-30

## 2020-06-25 RX ORDER — HEPARIN SODIUM,PORCINE 1000/ML
VIAL (ML) INJECTION PRN
Status: DISCONTINUED | OUTPATIENT
Start: 2020-06-25 | End: 2020-06-25 | Stop reason: SURG

## 2020-06-25 RX ORDER — MIDAZOLAM HYDROCHLORIDE 1 MG/ML
INJECTION INTRAMUSCULAR; INTRAVENOUS PRN
Status: DISCONTINUED | OUTPATIENT
Start: 2020-06-25 | End: 2020-06-25 | Stop reason: SURG

## 2020-06-25 RX ORDER — BISACODYL 10 MG
10 SUPPOSITORY, RECTAL RECTAL
Status: DISCONTINUED | OUTPATIENT
Start: 2020-06-25 | End: 2020-07-01 | Stop reason: HOSPADM

## 2020-06-25 RX ORDER — SODIUM CHLORIDE 9 MG/ML
INJECTION, SOLUTION INTRAVENOUS
Status: DISCONTINUED | OUTPATIENT
Start: 2020-06-25 | End: 2020-06-25 | Stop reason: SURG

## 2020-06-25 RX ORDER — MAGNESIUM SULFATE HEPTAHYDRATE 500 MG/ML
INJECTION, SOLUTION INTRAMUSCULAR; INTRAVENOUS PRN
Status: DISCONTINUED | OUTPATIENT
Start: 2020-06-25 | End: 2020-06-25 | Stop reason: SURG

## 2020-06-25 RX ORDER — DEXMEDETOMIDINE HYDROCHLORIDE 4 UG/ML
0-1.5 INJECTION, SOLUTION INTRAVENOUS CONTINUOUS
Status: DISCONTINUED | OUTPATIENT
Start: 2020-06-25 | End: 2020-06-25

## 2020-06-25 RX ORDER — SODIUM CHLORIDE, SODIUM GLUCONATE, SODIUM ACETATE, POTASSIUM CHLORIDE AND MAGNESIUM CHLORIDE 526; 502; 368; 37; 30 MG/100ML; MG/100ML; MG/100ML; MG/100ML; MG/100ML
INJECTION, SOLUTION INTRAVENOUS
Status: DISCONTINUED | OUTPATIENT
Start: 2020-06-25 | End: 2020-06-25 | Stop reason: SURG

## 2020-06-25 RX ORDER — ESMOLOL HYDROCHLORIDE 10 MG/ML
INJECTION INTRAVENOUS PRN
Status: DISCONTINUED | OUTPATIENT
Start: 2020-06-25 | End: 2020-06-25 | Stop reason: SURG

## 2020-06-25 RX ORDER — SODIUM CHLORIDE 9 MG/ML
INJECTION, SOLUTION INTRAVENOUS CONTINUOUS
Status: DISCONTINUED | OUTPATIENT
Start: 2020-06-25 | End: 2020-06-26 | Stop reason: ALTCHOICE

## 2020-06-25 RX ORDER — POLYETHYLENE GLYCOL 3350 17 G/17G
1 POWDER, FOR SOLUTION ORAL DAILY
Status: DISCONTINUED | OUTPATIENT
Start: 2020-06-26 | End: 2020-07-01 | Stop reason: HOSPADM

## 2020-06-25 RX ORDER — METHADONE HYDROCHLORIDE 10 MG/ML
INJECTION, SOLUTION INTRAMUSCULAR; INTRAVENOUS; SUBCUTANEOUS PRN
Status: DISCONTINUED | OUTPATIENT
Start: 2020-06-25 | End: 2020-06-25 | Stop reason: SURG

## 2020-06-25 RX ORDER — ONDANSETRON 2 MG/ML
8 INJECTION INTRAMUSCULAR; INTRAVENOUS EVERY 6 HOURS PRN
Status: DISCONTINUED | OUTPATIENT
Start: 2020-06-25 | End: 2020-07-01 | Stop reason: HOSPADM

## 2020-06-25 RX ORDER — MIDAZOLAM HYDROCHLORIDE 1 MG/ML
2 INJECTION INTRAMUSCULAR; INTRAVENOUS
Status: DISCONTINUED | OUTPATIENT
Start: 2020-06-25 | End: 2020-06-25

## 2020-06-25 RX ORDER — SODIUM CHLORIDE, SODIUM GLUCONATE, SODIUM ACETATE, POTASSIUM CHLORIDE AND MAGNESIUM CHLORIDE 526; 502; 368; 37; 30 MG/100ML; MG/100ML; MG/100ML; MG/100ML; MG/100ML
INJECTION, SOLUTION INTRAVENOUS PRN
Status: DISCONTINUED | OUTPATIENT
Start: 2020-06-25 | End: 2020-06-25

## 2020-06-25 RX ORDER — SODIUM CHLORIDE, SODIUM LACTATE, POTASSIUM CHLORIDE, CALCIUM CHLORIDE 600; 310; 30; 20 MG/100ML; MG/100ML; MG/100ML; MG/100ML
INJECTION, SOLUTION INTRAVENOUS
Status: DISCONTINUED | OUTPATIENT
Start: 2020-06-25 | End: 2020-06-25 | Stop reason: SURG

## 2020-06-25 RX ORDER — ROCURONIUM BROMIDE 10 MG/ML
INJECTION, SOLUTION INTRAVENOUS PRN
Status: DISCONTINUED | OUTPATIENT
Start: 2020-06-25 | End: 2020-06-25 | Stop reason: SURG

## 2020-06-25 RX ORDER — ACETAMINOPHEN 325 MG/1
650 TABLET ORAL EVERY 4 HOURS PRN
Status: DISCONTINUED | OUTPATIENT
Start: 2020-06-30 | End: 2020-07-01 | Stop reason: HOSPADM

## 2020-06-25 RX ORDER — NITROGLYCERIN 20 MG/100ML
INJECTION INTRAVENOUS
Status: DISCONTINUED | OUTPATIENT
Start: 2020-06-25 | End: 2020-06-25 | Stop reason: SURG

## 2020-06-25 RX ORDER — PROCHLORPERAZINE EDISYLATE 5 MG/ML
10 INJECTION INTRAMUSCULAR; INTRAVENOUS EVERY 6 HOURS PRN
Status: DISCONTINUED | OUTPATIENT
Start: 2020-06-25 | End: 2020-07-01 | Stop reason: HOSPADM

## 2020-06-25 RX ORDER — PROTAMINE SULFATE 10 MG/ML
INJECTION, SOLUTION INTRAVENOUS PRN
Status: DISCONTINUED | OUTPATIENT
Start: 2020-06-25 | End: 2020-06-25 | Stop reason: SURG

## 2020-06-25 RX ORDER — OXYCODONE HYDROCHLORIDE 10 MG/1
10 TABLET ORAL
Status: DISCONTINUED | OUTPATIENT
Start: 2020-06-25 | End: 2020-06-30

## 2020-06-25 RX ORDER — GABAPENTIN 100 MG/1
100 CAPSULE ORAL 2 TIMES DAILY
Status: DISCONTINUED | OUTPATIENT
Start: 2020-07-01 | End: 2020-07-01 | Stop reason: HOSPADM

## 2020-06-25 RX ORDER — EPINEPHRINE HCL IN 0.9 % NACL 4MG/250ML
0-.2 PLASTIC BAG, INJECTION (ML) INTRAVENOUS CONTINUOUS
Status: DISCONTINUED | OUTPATIENT
Start: 2020-06-25 | End: 2020-06-26 | Stop reason: ALTCHOICE

## 2020-06-25 RX ORDER — NITROGLYCERIN 20 MG/100ML
0-200 INJECTION INTRAVENOUS CONTINUOUS
Status: DISCONTINUED | OUTPATIENT
Start: 2020-06-25 | End: 2020-06-26 | Stop reason: ALTCHOICE

## 2020-06-25 RX ORDER — MORPHINE SULFATE 10 MG/ML
4 INJECTION, SOLUTION INTRAMUSCULAR; INTRAVENOUS
Status: DISCONTINUED | OUTPATIENT
Start: 2020-06-25 | End: 2020-06-25

## 2020-06-25 RX ORDER — NITROGLYCERIN 20 MG/100ML
INJECTION INTRAVENOUS PRN
Status: DISCONTINUED | OUTPATIENT
Start: 2020-06-25 | End: 2020-06-25 | Stop reason: SURG

## 2020-06-25 RX ORDER — ALUMINA, MAGNESIA, AND SIMETHICONE 2400; 2400; 240 MG/30ML; MG/30ML; MG/30ML
30 SUSPENSION ORAL EVERY 4 HOURS PRN
Status: DISCONTINUED | OUTPATIENT
Start: 2020-06-25 | End: 2020-07-01 | Stop reason: HOSPADM

## 2020-06-25 RX ORDER — PROMETHAZINE HYDROCHLORIDE 25 MG/1
25 SUPPOSITORY RECTAL EVERY 6 HOURS PRN
Status: DISCONTINUED | OUTPATIENT
Start: 2020-06-25 | End: 2020-07-01 | Stop reason: HOSPADM

## 2020-06-25 RX ORDER — DEXAMETHASONE SODIUM PHOSPHATE 4 MG/ML
INJECTION, SOLUTION INTRA-ARTICULAR; INTRALESIONAL; INTRAMUSCULAR; INTRAVENOUS; SOFT TISSUE PRN
Status: DISCONTINUED | OUTPATIENT
Start: 2020-06-25 | End: 2020-06-25 | Stop reason: SURG

## 2020-06-25 RX ORDER — LIDOCAINE HYDROCHLORIDE 20 MG/ML
INJECTION, SOLUTION EPIDURAL; INFILTRATION; INTRACAUDAL; PERINEURAL PRN
Status: DISCONTINUED | OUTPATIENT
Start: 2020-06-25 | End: 2020-06-25 | Stop reason: SURG

## 2020-06-25 RX ORDER — SODIUM CHLORIDE 9 MG/ML
INJECTION, SOLUTION INTRAVENOUS
Status: COMPLETED
Start: 2020-06-25 | End: 2020-06-25

## 2020-06-25 RX ORDER — ONDANSETRON 2 MG/ML
INJECTION INTRAMUSCULAR; INTRAVENOUS PRN
Status: DISCONTINUED | OUTPATIENT
Start: 2020-06-25 | End: 2020-06-25 | Stop reason: SURG

## 2020-06-25 RX ORDER — CLOPIDOGREL BISULFATE 75 MG/1
75 TABLET ORAL DAILY
Status: DISCONTINUED | OUTPATIENT
Start: 2020-06-26 | End: 2020-06-29

## 2020-06-25 RX ORDER — DEXTROSE MONOHYDRATE 25 G/50ML
50 INJECTION, SOLUTION INTRAVENOUS PRN
Status: ACTIVE | OUTPATIENT
Start: 2020-06-25 | End: 2020-06-26

## 2020-06-25 RX ORDER — DEXMEDETOMIDINE HYDROCHLORIDE 4 UG/ML
INJECTION, SOLUTION INTRAVENOUS
Status: DISCONTINUED | OUTPATIENT
Start: 2020-06-25 | End: 2020-06-25 | Stop reason: SURG

## 2020-06-25 RX ORDER — AMOXICILLIN 250 MG
2 CAPSULE ORAL 2 TIMES DAILY
Status: DISCONTINUED | OUTPATIENT
Start: 2020-06-25 | End: 2020-07-01 | Stop reason: HOSPADM

## 2020-06-25 RX ORDER — NOREPINEPHRINE BITARTRATE 0.03 MG/ML
0-30 INJECTION, SOLUTION INTRAVENOUS CONTINUOUS
Status: DISCONTINUED | OUTPATIENT
Start: 2020-06-25 | End: 2020-06-26 | Stop reason: ALTCHOICE

## 2020-06-25 RX ORDER — PHENYLEPHRINE HCL IN 0.9% NACL 0.5 MG/5ML
SYRINGE (ML) INTRAVENOUS PRN
Status: DISCONTINUED | OUTPATIENT
Start: 2020-06-25 | End: 2020-06-25 | Stop reason: SURG

## 2020-06-25 RX ADMIN — ROCURONIUM BROMIDE 100 MG: 10 INJECTION, SOLUTION INTRAVENOUS at 07:44

## 2020-06-25 RX ADMIN — ESMOLOL HYDROCHLORIDE 10 MG: 100 INJECTION, SOLUTION INTRAVENOUS at 08:15

## 2020-06-25 RX ADMIN — VANCOMYCIN HYDROCHLORIDE 1.5 G: 1 INJECTION, POWDER, LYOPHILIZED, FOR SOLUTION INTRAVENOUS at 07:50

## 2020-06-25 RX ADMIN — LIDOCAINE HYDROCHLORIDE 40 MG: 20 INJECTION, SOLUTION EPIDURAL; INFILTRATION; INTRACAUDAL at 07:44

## 2020-06-25 RX ADMIN — NITROGLYCERIN 100 MCG: 20 INJECTION INTRAVENOUS at 09:26

## 2020-06-25 RX ADMIN — ONDANSETRON 8 MG: 2 INJECTION INTRAMUSCULAR; INTRAVENOUS at 17:26

## 2020-06-25 RX ADMIN — NITROGLYCERIN 200 MCG: 20 INJECTION INTRAVENOUS at 11:28

## 2020-06-25 RX ADMIN — CLEVIPIDINE 250 MCG: 0.5 EMULSION INTRAVENOUS at 08:05

## 2020-06-25 RX ADMIN — ESMOLOL HYDROCHLORIDE 10 MG: 100 INJECTION, SOLUTION INTRAVENOUS at 11:31

## 2020-06-25 RX ADMIN — DEXMEDETOMIDINE HYDROCHLORIDE 0.6 MCG/KG/HR: 100 INJECTION, SOLUTION INTRAVENOUS at 11:11

## 2020-06-25 RX ADMIN — ESMOLOL HYDROCHLORIDE 10 MG: 100 INJECTION, SOLUTION INTRAVENOUS at 08:12

## 2020-06-25 RX ADMIN — NITROGLYCERIN 100 MCG: 20 INJECTION INTRAVENOUS at 09:21

## 2020-06-25 RX ADMIN — NITROGLYCERIN 100 MCG: 20 INJECTION INTRAVENOUS at 08:59

## 2020-06-25 RX ADMIN — MAGNESIUM SULFATE 1 G: 1 INJECTION INTRAVENOUS at 12:59

## 2020-06-25 RX ADMIN — NITROGLYCERIN 50 MCG: 20 INJECTION INTRAVENOUS at 09:24

## 2020-06-25 RX ADMIN — SODIUM BICARBONATE 50 MEQ: 84 INJECTION INTRAVENOUS at 14:56

## 2020-06-25 RX ADMIN — CLEVIPIDINE 4 MG/HR: 0.5 EMULSION INTRAVENOUS at 11:32

## 2020-06-25 RX ADMIN — CALCIUM CHLORIDE 1 G: 100 INJECTION INTRAVENOUS; INTRAVENTRICULAR at 11:11

## 2020-06-25 RX ADMIN — METHADONE HYDROCHLORIDE 10 MG: 10 INJECTION, SOLUTION INTRAMUSCULAR; INTRAVENOUS; SUBCUTANEOUS at 07:35

## 2020-06-25 RX ADMIN — NITROGLYCERIN 200 MCG: 20 INJECTION INTRAVENOUS at 09:28

## 2020-06-25 RX ADMIN — CLEVIPIDINE 500 MCG: 0.5 EMULSION INTRAVENOUS at 08:38

## 2020-06-25 RX ADMIN — NITROGLYCERIN 50 MCG: 20 INJECTION INTRAVENOUS at 09:30

## 2020-06-25 RX ADMIN — NITROGLYCERIN 100 MCG: 20 INJECTION INTRAVENOUS at 08:55

## 2020-06-25 RX ADMIN — DEXAMETHASONE SODIUM PHOSPHATE 4 MG: 4 INJECTION, SOLUTION INTRA-ARTICULAR; INTRALESIONAL; INTRAMUSCULAR; INTRAVENOUS; SOFT TISSUE at 07:44

## 2020-06-25 RX ADMIN — SULFAMETHOXAZOLE AND TRIMETHOPRIM 1 TABLET: 800; 160 TABLET ORAL at 17:31

## 2020-06-25 RX ADMIN — HEPARIN SODIUM 10000 UNITS: 1000 INJECTION, SOLUTION INTRAVENOUS; SUBCUTANEOUS at 09:32

## 2020-06-25 RX ADMIN — ESMOLOL HYDROCHLORIDE 10 MG: 100 INJECTION, SOLUTION INTRAVENOUS at 08:19

## 2020-06-25 RX ADMIN — AMINOCAPROIC ACID 8 G: 250 INJECTION, SOLUTION INTRAVENOUS at 07:50

## 2020-06-25 RX ADMIN — DOCUSATE SODIUM 50 MG AND SENNOSIDES 8.6 MG 2 TABLET: 8.6; 5 TABLET, FILM COATED ORAL at 17:33

## 2020-06-25 RX ADMIN — SODIUM CHLORIDE, SODIUM GLUCONATE, SODIUM ACETATE, POTASSIUM CHLORIDE AND MAGNESIUM CHLORIDE: 526; 502; 368; 37; 30 INJECTION, SOLUTION INTRAVENOUS at 07:50

## 2020-06-25 RX ADMIN — NITROGLYCERIN 100 MCG: 20 INJECTION INTRAVENOUS at 09:34

## 2020-06-25 RX ADMIN — CLEVIPIDINE 500 MCG: 0.5 EMULSION INTRAVENOUS at 08:51

## 2020-06-25 RX ADMIN — CLEVIPIDINE 250 MCG: 0.5 EMULSION INTRAVENOUS at 08:09

## 2020-06-25 RX ADMIN — ACETAMINOPHEN 1000 MG: 500 TABLET ORAL at 17:32

## 2020-06-25 RX ADMIN — OXYCODONE HYDROCHLORIDE 10 MG: 10 TABLET ORAL at 17:47

## 2020-06-25 RX ADMIN — DEXMEDETOMIDINE HYDROCHLORIDE 0.6 MCG/KG/HR: 100 INJECTION, SOLUTION INTRAVENOUS at 13:04

## 2020-06-25 RX ADMIN — NITROGLYCERIN 100 MCG: 20 INJECTION INTRAVENOUS at 08:53

## 2020-06-25 RX ADMIN — NITROGLYCERIN 300 MCG: 20 INJECTION INTRAVENOUS at 11:31

## 2020-06-25 RX ADMIN — GABAPENTIN 300 MG: 300 CAPSULE ORAL at 07:13

## 2020-06-25 RX ADMIN — VANCOMYCIN HYDROCHLORIDE 1.5 G: 1 INJECTION, POWDER, LYOPHILIZED, FOR SOLUTION INTRAVENOUS at 19:23

## 2020-06-25 RX ADMIN — NITROGLYCERIN 200 MCG: 20 INJECTION INTRAVENOUS at 11:26

## 2020-06-25 RX ADMIN — NITROGLYCERIN 100 MCG/MIN: 20 INJECTION INTRAVENOUS at 11:11

## 2020-06-25 RX ADMIN — NITROGLYCERIN 50 MCG: 20 INJECTION INTRAVENOUS at 09:03

## 2020-06-25 RX ADMIN — MIDAZOLAM HYDROCHLORIDE 2 MG: 1 INJECTION, SOLUTION INTRAMUSCULAR; INTRAVENOUS at 07:35

## 2020-06-25 RX ADMIN — SULFAMETHOXAZOLE AND TRIMETHOPRIM 1 TABLET: 800; 160 TABLET ORAL at 05:37

## 2020-06-25 RX ADMIN — ONDANSETRON 4 MG: 2 INJECTION INTRAMUSCULAR; INTRAVENOUS at 11:42

## 2020-06-25 RX ADMIN — SODIUM CHLORIDE: 9 INJECTION, SOLUTION INTRAVENOUS at 07:50

## 2020-06-25 RX ADMIN — NITROGLYCERIN 50 MCG: 20 INJECTION INTRAVENOUS at 09:25

## 2020-06-25 RX ADMIN — ROCURONIUM BROMIDE 50 MG: 10 INJECTION, SOLUTION INTRAVENOUS at 09:07

## 2020-06-25 RX ADMIN — CLEVIPIDINE 500 MCG: 0.5 EMULSION INTRAVENOUS at 08:18

## 2020-06-25 RX ADMIN — NITROGLYCERIN 60 MCG/MIN: 20 INJECTION INTRAVENOUS at 18:29

## 2020-06-25 RX ADMIN — PROPOFOL 200 MG: 10 INJECTION, EMULSION INTRAVENOUS at 07:44

## 2020-06-25 RX ADMIN — HEPARIN SODIUM 31000 UNITS: 1000 INJECTION, SOLUTION INTRAVENOUS; SUBCUTANEOUS at 08:56

## 2020-06-25 RX ADMIN — CLEVIPIDINE 500 MCG: 0.5 EMULSION INTRAVENOUS at 08:47

## 2020-06-25 RX ADMIN — ACETAMINOPHEN 1000 MG: 500 TABLET ORAL at 07:13

## 2020-06-25 RX ADMIN — CLEVIPIDINE 500 MCG: 0.5 EMULSION INTRAVENOUS at 08:11

## 2020-06-25 RX ADMIN — SODIUM CHLORIDE 2 UNITS/HR: 9 INJECTION, SOLUTION INTRAVENOUS at 09:45

## 2020-06-25 RX ADMIN — ACETAMINOPHEN 1000 MG: 500 TABLET ORAL at 22:54

## 2020-06-25 RX ADMIN — SODIUM CHLORIDE, POTASSIUM CHLORIDE, SODIUM LACTATE AND CALCIUM CHLORIDE: 600; 310; 30; 20 INJECTION, SOLUTION INTRAVENOUS at 07:30

## 2020-06-25 RX ADMIN — NITROGLYCERIN 100 MCG: 20 INJECTION INTRAVENOUS at 09:07

## 2020-06-25 RX ADMIN — Medication 100 MCG: at 08:26

## 2020-06-25 RX ADMIN — CARBIDOPA AND LEVODOPA 1 TABLET: 25; 100 TABLET ORAL at 17:32

## 2020-06-25 RX ADMIN — AMINOCAPROIC ACID 5 G: 250 INJECTION, SOLUTION INTRAVENOUS at 08:30

## 2020-06-25 RX ADMIN — GABAPENTIN 300 MG: 300 CAPSULE ORAL at 17:33

## 2020-06-25 RX ADMIN — ROCURONIUM BROMIDE 25 MG: 10 INJECTION, SOLUTION INTRAVENOUS at 11:13

## 2020-06-25 RX ADMIN — PROTAMINE SULFATE 350 MG: 10 INJECTION, SOLUTION INTRAVENOUS at 11:26

## 2020-06-25 RX ADMIN — NITROGLYCERIN 50 MCG: 20 INJECTION INTRAVENOUS at 09:31

## 2020-06-25 RX ADMIN — CLEVIPIDINE 500 MCG: 0.5 EMULSION INTRAVENOUS at 08:15

## 2020-06-25 RX ADMIN — MUPIROCIN 1 APPLICATION: 20 OINTMENT TOPICAL at 17:34

## 2020-06-25 RX ADMIN — MAGNESIUM SULFATE HEPTAHYDRATE 2 G: 500 INJECTION, SOLUTION INTRAMUSCULAR; INTRAVENOUS at 10:09

## 2020-06-25 RX ADMIN — VALACYCLOVIR HYDROCHLORIDE 1000 MG: 500 TABLET, FILM COATED ORAL at 05:37

## 2020-06-25 RX ADMIN — ESMOLOL HYDROCHLORIDE 10 MG: 100 INJECTION, SOLUTION INTRAVENOUS at 08:47

## 2020-06-25 RX ADMIN — ATORVASTATIN CALCIUM 80 MG: 80 TABLET, FILM COATED ORAL at 17:30

## 2020-06-25 RX ADMIN — Medication 200 MCG: at 09:39

## 2020-06-25 ASSESSMENT — PULMONARY FUNCTION TESTS: FVC: 1.3

## 2020-06-25 NOTE — DIETARY
Nutrition Services: Consult received for cardiac rehab det education (repeat). Diet education provided by RD on 6/23; see Education tab for documentation. RD available as needed.

## 2020-06-25 NOTE — PROGRESS NOTES
2 RN skin check completed with TANIA Pittman    No signs of skin breakdown. Bilateral ears pink, blanchable. Cath site to R groin, dressing CD&I. Patient turns independently, pillows in place for support and positioning.

## 2020-06-25 NOTE — ANESTHESIA PROCEDURE NOTES
Arterial Line  Performed by: Shlomo Mccarty M.D.  Authorized by: Shlomo Mccarty M.D.     Start Time:  6/25/2020 7:40 AM  End Time:  6/25/2020 7:50 AM  Localization: ultrasound guidance  Image captured, interpreted and electronically stored.  Patient Location:  OR  Indication: continuous blood pressure monitoring and blood sampling needed        Catheter Size:  20 G  Seldinger Technique?: Yes    Laterality:  Left  Site:  Radial artery  Line Secured:  Antimicrobial disc, tape and transparent dressing  Events: patient tolerated procedure well with no complications     2 attempts made on the right, unable to advance catheter.  Successful with first attempt on the left.

## 2020-06-25 NOTE — CARE PLAN
Problem: Pre Op  Goal: Optimal preparation for CABG/Heart Valve surgery  Intervention: Consents obtained for Surgery, Anesthesia and Transfusion/Bloodless Program Participant  Note: All consents printed and placed in chart. Blood consent signed.   Intervention: Pre op checklist completed. Admit profile completed. Advanced directive verified.  Note: Complete  Intervention: Pre op labs per MD order: CBC, CMP, INR, PTT, COD if not done in past 72 hours.  HbA1C if diabetic.  Note: All labs have been resulted within the past 72 hours  Intervention: Pre op diagnostics per MD order (EKG, CXR, Bilateral carotid doppler study and vein mapping)  Note: All diagnostics complete  Intervention: Baseline assessment documented to include IS volume, weight, bilateral BP and peripheral pulses.  Note: Completed by dayshift RN on 6/24/20  Intervention: NPO at midnight except cardiac medications. (No ASA, coumadin or Plavix)  Note: NPO since midnight, sips with meds this morning  Intervention: Shower with chlorhexidine x 2  Note: Showered 6/24 before bed with CHG wipes, CHG wipes completed again this morning  Intervention: Prep with clippers  Note: complete  Intervention: Remove dentures, valuables and jewelry  Note: Pt not in possession of dentures or jewelry    Intervention: Determine if patient is taking Beta Blockers  Note: Beta blocker held due to HR 40-50's  Intervention: Cardiac/BP meds and Mupirocin ointment given prior to surgery. Verify orders for hold or administer of anticoags.  Note: BP meds held  Intervention: If diabetic, administer insulin night before surgery  Note: Pt not diabetic

## 2020-06-25 NOTE — ASSESSMENT & PLAN NOTE
Currently requiring nitroglycerin and Cleviprex infusion postoperatively, continue strict blood pressure control and follow

## 2020-06-25 NOTE — PROGRESS NOTES
Patient lives in [place]. Referral for cardiology telemedicine placed today as they have a location in [place].

## 2020-06-25 NOTE — PROGRESS NOTES
RBP: 130/66  LBP: 138/53  Radial pulses equal bilaterally +2  Pedal pulses equal bilaterally +2  Baseline IS 2750

## 2020-06-25 NOTE — ANESTHESIA PROCEDURE NOTES
Central Venous Line  Performed by: Shlomo Mccarty M.D.  Authorized by: Shlomo Mccarty M.D.     Start Time:  6/25/2020 7:50 AM  End Time:  6/25/2020 8:00 AM  Patient Location:  OR  Indication: central venous access        provider hand hygiene performed prior to central venous catheter insertion, all 5 sterile barriers used (gloves, gown, cap, mask, large sterile drape) during central venous catheter insertion and skin prep agent completely dried prior to procedure    Patient Position:  Trendelenburg  Laterality:  Right  Site:  Internal jugular  Prep:  Chlorhexidine  Catheter size: 8 Fr.  Catheter length (cm): 16.  Number of Lumens:  Triple lumen  target vein identified, needle advanced into vein and blood aspirated and guidewire advanced into vein    Seldinger Technique?: Yes    Ultrasound-Guided: ultrasound-guided  Image captured, interpreted and electronically stored.  Sterile Gel and Probe Cover Used for Ultrasound?: Yes    Intravenous Verification: verified by ultrasound, venous blood return and chest x-ray pending    all ports aspirated, all ports flushed easily, guidewire was removed intact, biopatch was applied, line was sutured in place and dressing was applied    Events: patient tolerated procedure well with no complications

## 2020-06-25 NOTE — CARE PLAN
Problem: Pre Op  Goal: Optimal preparation for CABG/Heart Valve surgery  Outcome: PROGRESSING AS EXPECTED  Intervention: Pre Op education to patient/significant other. Provide patient [facility] Patient Guideline for Cardiac Surgery (See Pt. Ed.)  Note: Reviewed packet with patient, all questions answered. Reviewed expectations for after surgery  Intervention: Consents obtained for Surgery, Anesthesia and Transfusion/Bloodless Program Participant  Note: To be signed in pre-op  Intervention: Pre op checklist completed. Admit profile completed. Advanced directive verified.  Note: Checklist to be completed by NOC RN    Intervention: Pre op labs per MD order: CBC, CMP, INR, PTT, COD if not done in past 72 hours.  HbA1C if diabetic.  Note: Labs ordered  Intervention: Pre op diagnostics per MD order (EKG, CXR, Bilateral carotid doppler study and vein mapping)  Note: All diagnostics completed  Intervention: Baseline assessment documented to include IS volume, weight, bilateral BP and peripheral pulses.  Note: IS 2750, Weight 77.1 kg,   RBP: 130/66  LBP: 138/53  Radial pulses equal bilaterally +2  Pedal pulses equal bilaterally +2  Intervention: NPO at midnight except cardiac medications. (No ASA, coumadin or Plavix)  Note: Educated to be NPO at midnight  Intervention: Shower with chlorhexidine x 2  Note: NOC RN to complete shower 1  Intervention: Prep with clippers  Note: NOC RN to complete  Intervention: Remove dentures, valuables and jewelry  Note: NOC RN to complete  Intervention: Determine if patient is taking Beta Blockers  Note: Metoprolol 12.5mg  Intervention: Cardiac/BP meds and Mupirocin ointment given prior to surgery. Verify orders for hold or administer of anticoags.  Note: NOC RN to complete  Intervention: If diabetic, administer insulin night before surgery  Note: No insulin ordered, pt pre-daibetic  Intervention: If diabetic, FSBS in am and follow sliding scale if indicated  Note: Not indicated  Intervention:  Pre op antibiotics sent to surgery with patient per MD order (surgery to administer)  Note: N/A  Intervention: Medical record sent to surgery with current H&P  Note: Will be sent with pt prior to sx in morning  Intervention: Transport to surgery with cardiac monitor and oxygen  Note: To be completed

## 2020-06-25 NOTE — ANESTHESIA QCDR
2019 Qualified Clinical Data Registry (for Quality Improvement)     Postoperative nausea/vomiting risk protocol (Adult = 18 yrs and Pediatric 3-17 yrs)- (430 and 463)  General inhalation anesthetic (NOT TIVA) with PONV risk factors: Yes  Provision of anti-emetic therapy with at least 2 different classes of agents: Yes   Patient DID NOT receive anti-emetic therapy and reason is documented in Medical Record:  N/A    Multimodal Pain Management- (477)  Non-emergent surgery AND patient age >= 18: Yes  Use of Multimodal Pain Management, two or more drugs and/or interventions, NOT including systemic opioids: Yes  Exception: Documented allergy to multiple classes of analgesics: N/A    Smoking Abstinence (404)  Patient is current smoker (cigarette, pipe, e-cig, marijuanna): No  Elective Surgery:   Abstinence instructions provided prior to day of surgery:   Patient abstained from smoking on day of surgery:     Pre-Op Beta-Blocker in Isolated CABG (44)  Isolated CABG AND patient age >= 18: No  Beta-blocker admin within 24 hours of surgical incision:   Exception:of medical reason(s) for not administering beta blocker within 24 hours prior to surgical incision (e.g., not  indicated,other medical reason):     PACU assessment of acute postoperative pain prior to Anesthesia Care End- Applies to Patients Age = 18- (ABG7)  Initial PACU pain score is which of the following: Pain not assessed  Patient unable to report pain score: Yes (Patient Unable to Report)    Post-anesthetic transfer of care checklist/protocol to PACU/ICU- (426 and 427)  Upon conclusion of case, patient transferred to which of the following locations: ICU  Use of transfer checklist/protocol: Yes  Exclusion: Service Performed in Patient Hospital Room (and thus did not require transfer): N/A  Unplanned admission to ICU related to anesthesia service up through end of PACU care- (MD51)  Unplanned admission to ICU (not initially anticipated at anesthesia start time):  No

## 2020-06-25 NOTE — ANESTHESIA TIME REPORT
Anesthesia Start and Stop Event Times     Date Time Event    6/25/2020 0626 Ready for Procedure     0730 Anesthesia Start     1232 Anesthesia Stop        Responsible Staff  06/25/20    Name Role Begin End    Shlomo Mccarty M.D. Anesth 0730 1232        Preop Diagnosis (Free Text):  Pre-op Diagnosis     CAD        Preop Diagnosis (Codes):    Post op Diagnosis  CAD (coronary artery disease)      Premium Reason  Non-Premium    Comments:

## 2020-06-25 NOTE — ANESTHESIA PREPROCEDURE EVALUATION
Relevant Problems   PULMONARY   (+) COPD (chronic obstructive pulmonary disease) (HCC)      CARDIAC   (+) Hypertension   (+) NSTEMI (non-ST elevated myocardial infarction) (HCC)      ENDO   (+) Type 2 diabetes mellitus (HCC)      Other   (+) Pancreatitis, chronic (HCC)   (+) Parkinson's disease (HCC)       Physical Exam    Airway   TM distance: >3 FB  Neck ROM: full       Cardiovascular - normal exam  Rhythm: regular  Rate: normal  (-) murmur     Dental              Pulmonary - normal exam  (+) decreased breath sounds     Abdominal    Neurological - normal exam                 Anesthesia Plan    ASA 3   ASA physical status 3 criteria: CAD/stents (> 3 months)    Plan - general       Airway plan will be ETT        Induction: intravenous      Pertinent diagnostic labs and testing reviewed    Informed Consent:    Anesthetic plan and risks discussed with patient.    Use of blood products discussed with: patient whom.

## 2020-06-25 NOTE — PROGRESS NOTES
Critical Care Progress Note    Date of admission  6/20/2020    Chief Complaint  70 y.o. male admitted 6/20/2020 with chest pain, nausea found to have NSTEMI with multivessel disease, transferred to ICU 6/25 after 3V CABG    Hospital Course    This is a 70-year-old male with a history of diabetes mellitus, Parkinson's disease, hypertension, COPD and is a former smoker who presented to [facility] on or around 6/20 with chest pain nausea and abdominal discomfort.  He was found to have NSTEMI and transferred to [facility] for further evaluation and treatment.  Cardiac catheterization showed multivessel disease.  Today he underwent CABG x3 and is transferred back to the intensive care unit on full ventilator support.  Bedside handoff was taken from cardiac anesthesiology.  He has had some hypertension requiring nitroglycerin and Cleviprex infusion which is coming under control now.  He is on full ventilator support.  I reviewed anesthesiology case log and examined the patient.  Patient has preserved left ventricular ejection fraction and came off-pump well.      Interval Problem Update  Reviewed last 24 hour events:      Review of Systems  Review of Systems   Unable to perform ROS: Intubated        Vital Signs for last 24 hours   Temp:  [35 °C (95 °F)-36.4 °C (97.6 °F)] 35 °C (95 °F)  Pulse:  [] 78  Resp:  [10-26] 21  BP: ()/(34-66) 77/44  SpO2:  [91 %-100 %] 97 %    Hemodynamic parameters for last 24 hours  CVP:  [5 MM HG-204 MM HG] 204 MM HG    Respiratory Information for the last 24 hours  Vent Mode: ASV  PEEP/CPAP: 8  MAP: 11  Control VTE (exp VT): 523    Physical Exam   Physical Exam  Vitals signs and nursing note reviewed.   HENT:      Head: Atraumatic.      Nose: Nose normal.      Mouth/Throat:      Mouth: Mucous membranes are moist.      Comments: ETT in place  Eyes:      Pupils: Pupils are equal, round, and reactive to light.   Neck:      Musculoskeletal: Neck supple. No neck rigidity.    Cardiovascular:      Rate and Rhythm: Normal rate and regular rhythm.      Pulses: Normal pulses.      Comments: Median sternotomy incision dressed, substernal chest tubes in place  Pulmonary:      Comments: Intubated, full ventilator support, moderately diminished breath sounds  Abdominal:      General: There is no distension.      Palpations: Abdomen is soft. There is no mass.   Musculoskeletal:         General: No swelling or deformity.   Skin:     General: Skin is warm and dry.      Capillary Refill: Capillary refill takes less than 2 seconds.   Neurological:      General: No focal deficit present.      Comments: Sedated immediately postoperative, no current withdrawal to stimulus         Medications  Current Facility-Administered Medications   Medication Dose Route Frequency Provider Last Rate Last Dose   • EPINEPHrine (Adrenalin) infusion 4 mg/250 mL (premix)  0-0.2 mcg/kg/min Intravenous Continuous Fabiola C Gabe, P.A.-C.   Stopped at 06/25/20 1300   • norepinephrine (Levophed) infusion 8 mg/250 mL (premix)  0-30 mcg/min Intravenous Continuous Fabiola C Gabe, P.A.-C.   Stopped at 06/25/20 1300   • insulin regular human (HUMULIN/NOVOLIN R) 62.5 Units in  mL infusion per protocol  1-6 Units/hr Intravenous Continuous Fabiola C Gabe, P.A.-C. 16 mL/hr at 06/25/20 1230 4 Units/hr at 06/25/20 1230   • Respiratory Therapy Consult   Nebulization Continuous RT Fabiola C Gabe, P.A.-C.       • NS infusion   Intravenous Continuous Fabioal C Gabe, P.A.-C. 10 mL/hr at 06/25/20 1306     • calcium CHLORIDE 1,000 mg in D5W 100 mL IVPB  1,000 mg Intravenous Once PRN Fabiola C Gabe, P.A.-C.       • Magnesium Sulfate in D5W IVPB premix 1 g  1 g Intravenous DAILY Fabiola C Gabe, P.A.-C.   Stopped at 06/25/20 1359   • K+ Scale: Goal of 4.5  1 Each Intravenous Q6HRS Fabiola C Gabe, P.A.-C.   Stopped at 06/25/20 1245   • Pharmacy Consult Request ...Pain Management Review 1 Each  1 Each Other PHARMACY TO DOSE Fabiola C Gabe, P.A.-C.       •  acetaminophen (TYLENOL) tablet 1,000 mg  1,000 mg Oral Q6HRS Fabiola C Gabe, P.A.-C.       • gabapentin (NEURONTIN) capsule 300 mg  300 mg Oral BID Fabiola C Gabe, P.A.-C.        Followed by   • [START ON 7/1/2020] gabapentin (NEURONTIN) capsule 100 mg  100 mg Oral BID Fabiola C Gabe, P.A.-C.       • senna-docusate (PERICOLACE or SENOKOT S) 8.6-50 MG per tablet 2 Tab  2 Tab Oral BID Fabiola C Gabe, P.A.-C.        And   • [START ON 6/26/2020] polyethylene glycol/lytes (MIRALAX) PACKET 1 Packet  1 Packet Oral DAILY Fabiola C Gabe, P.A.-C.        And   • [START ON 6/27/2020] magnesium hydroxide (MILK OF MAGNESIA) suspension 30 mL  30 mL Oral DAILY Fabiola C Gabe, P.A.-C.        And   • bisacodyl (DULCOLAX) suppository 10 mg  10 mg Rectal QDAY PRN Fabiola C Gabe, P.A.-C.       • electrolyte-A (PLASMALYTE-A) infusion   Intravenous PRN Fabiola C Gabe, P.A.-C.       • [START ON 6/26/2020] enoxaparin (LOVENOX) inj 40 mg  40 mg Subcutaneous Q EVENING Fabiola C Gabe, P.A.-C.       • vancomycin 1500 mg/250mL NS IVPB premix  1.5 g Intravenous Once Fabiola C Gabe, P.A.-C.       • mupirocin (BACTROBAN) 2 % ointment 1 Application  1 Application Topical BID Fabiola C Gabe, P.A.-C.       • [START ON 6/26/2020] metoprolol (LOPRESSOR) tablet 12.5 mg  12.5 mg Oral BID Fabiola C Gabe, P.A.-C.        Followed by   • [START ON 6/27/2020] metoprolol (LOPRESSOR) tablet 25 mg  25 mg Oral BID Fabiola C Gabe, P.A.-C.       • [START ON 6/26/2020] aspirin EC (ECOTRIN) tablet 81 mg  81 mg Oral DAILY Fabiola C Gabe, P.A.-C.       • [START ON 6/26/2020] clopidogrel (PLAVIX) tablet 75 mg  75 mg Oral DAILY Fabiola C Gabe, P.A.-C.       • clevidipine (CLEVIPREX) IV emulsion  1-21 mg/hr Intravenous Continuous Fabiola C Gabe, P.A.-C.   Stopped at 06/25/20 1303   • nitroglycerin 50 mg in D5W 250 ml infusion  0-200 mcg/min Intravenous Continuous Fabiola C Gabe, P.A.-C. 24 mL/hr at 06/25/20 1303 80 mcg/min at 06/25/20 1303   • oxyCODONE immediate-release (ROXICODONE) tablet 5 mg  5 mg Oral  Q3HRS PRN Fabiola C Gabe, P.A.-C.       • oxyCODONE immediate release (ROXICODONE) tablet 10 mg  10 mg Oral Q3HRS PRN Fabiola C Gabe, P.A.-C.       • morphine (pf) 10 mg/mL injection 4 mg  4 mg Intravenous Q3HRS PRN Fabiola C Gabe, P.A.-C.       • tramadol (ULTRAM) 50 MG tablet 50 mg  50 mg Oral Q4HRS PRN Fabiola C Gabe, P.A.-C.       • midazolam (VERSED) 2 MG/2ML injection 2 mg  2 mg Intravenous Q HOUR PRN Fabiola C Gabe, P.A.-C.       • dexmedetomidine (PRECEDEX) 400 mcg/100mL NS premix infusion  0-1.5 mcg/kg/hr Intravenous Continuous Fabiola C Gabe, P.A.-C. 3.9 mL/hr at 06/25/20 1315 0.2 mcg/kg/hr at 06/25/20 1315   • sodium bicarbonate 8.4 % injection 50 mEq  50 mEq Intravenous Q HOUR PRN Fabiola C Gabe, P.A.-C.       • morphine (pf) 10 mg/mL injection 4 mg  4 mg Intravenous Q HOUR PRN Fabiola C Gabe, P.A.-C.       • ondansetron (ZOFRAN) syringe/vial injection 8 mg  8 mg Intravenous Q6HRS PRN Fabiola C Gabe, P.A.-C.        Or   • prochlorperazine (COMPAZINE) injection 10 mg  10 mg Intravenous Q6HRS PRN Fabiola C Gabe, P.A.-C.        Or   • promethazine (PHENERGAN) suppository 25 mg  25 mg Rectal Q6HRS PRN Fabiola C Gabe, P.A.-C.       • [START ON 6/30/2020] acetaminophen (TYLENOL) tablet 650 mg  650 mg Oral Q4HRS PRN Fabiola C Agbe, P.A.-C.        Or   • [START ON 6/30/2020] acetaminophen (TYLENOL) suppository 650 mg  650 mg Rectal Q4HRS PRN Fabiola C Gabe, P.A.-C.       • mag hydrox-al hydrox-simeth (MAALOX PLUS ES or MYLANTA DS) suspension 30 mL  30 mL Oral Q4HRS PRN Fabiola C Gabe, P.A.-C.       • insulin regular human (HUMULIN/NOVOLIN R) 62.5 Units in  mL infusion per protocol  1-6 Units/hr Intravenous Continuous Fabiola C Gabe, P.A.-C.        And   • insulin regular (HUMULIN R) injection 0-14 Units  0-14 Units Intravenous Once Fabiola C Gabe, P.A.-C.        And   • insulin regular (HUMULIN R) injection 0-10 Units  0-10 Units Intravenous PRN Fabiola C Gabe, P.A.-C.        And   • dextrose 50% (D50W) injection 50 mL  50 mL Intravenous  PRN Fabiola RADHA Bernal P.A.-C.       • insulin lispro (HUMALOG) injection 0-20 Units  0-20 Units Subcutaneous PRN Fabiola JOSEPHINE Poole-C.       • atorvastatin (LIPITOR) tablet 80 mg  80 mg Oral Q EVENING Jia Suarez M.D.   80 mg at 06/24/20 1705   • omeprazole (PRILOSEC) capsule 20 mg  20 mg Oral DAILY Jia Suarez M.D.   Stopped at 06/25/20 0600   • carbidopa-levodopa (SINEMET)  MG tablet 1 Tab  1 Tab Oral TID Jia Suarez M.D.   Stopped at 06/25/20 0600   • finasteride (PROSCAR) tablet 5 mg  5 mg Oral DAILY Jia Suarez M.D.   Stopped at 06/25/20 0600   • pancrelipase (Lip-Prot-Amyl) (CREON 6000) 6000 units capsule 6,000 Units  1 Cap Oral TID WITH MEALS Jia Suarez M.D.   6,000 Units at 06/24/20 1705   • PARoxetine (PAXIL) tablet 40 mg  40 mg Oral DAILY Jia Suarez M.D.   Stopped at 06/25/20 0600   • tamsulosin (FLOMAX) capsule 0.4 mg  0.4 mg Oral DAILY Jia Suarez M.D.   Stopped at 06/25/20 0600   • valACYclovir (VALTREX) caplet 1,000 mg  1,000 mg Oral DAILY Jia Suarez M.D.   1,000 mg at 06/25/20 0537   • sulfamethoxazole-trimethoprim (BACTRIM DS) 800-160 MG tablet 1 Tab  1 Tab Oral Q12HRS Jia Suarez M.D.   1 Tab at 06/25/20 0537       Fluids    Intake/Output Summary (Last 24 hours) at 6/25/2020 1330  Last data filed at 6/25/2020 1231  Gross per 24 hour   Intake 3648.41 ml   Output 1650 ml   Net 1998.41 ml       Laboratory  Recent Labs     06/25/20  1114 06/25/20  1145 06/25/20  1235   ISTATAPH 7.435 7.289* 7.244*   ISTATAPCO2 32.3 51.5* 55.8*   ISTATAPO2 253* 374* 220*   ISTATATCO2 23 26 26   [id number] 100* 100* 100*   ISTATARTHCO3 21.7 24.7 24.1   ISTATARTBE -2 -2 -4   ISTATTEMP 37.0 C 37.0 C 35.4 C   ISTATFIO2 80 100 100   ISTATSPEC Arterial Arterial Arterial   ISTATAPHTC 7.435 7.289* 7.266*   [id number] 253* 374* 212*         Recent Labs     06/23/20  0117 06/23/20  1141 06/24/20  0404 06/25/20  0335   SODIUM 137  --  135 137   POTASSIUM 4.4  --  4.3 4.6    CHLORIDE 104  --  103 103   CO2 21  --  24 24   BUN 14  --  10 14   CREATININE 0.94  --  1.16 1.21   MAGNESIUM  --  1.9 1.9 2.0   CALCIUM 8.8  --  8.8 8.9     Recent Labs     06/23/20 0117 06/23/20 0405 06/24/20 0404 06/25/20  0335   LIPASE  --  19  --   --    GLUCOSE 105*  --  112* 110*     Recent Labs     06/23/20 0117 06/25/20  0335   WBC 9.0 8.2   NEUTSPOLYS  --  54.50   LYMPHOCYTES  --  28.90   MONOCYTES  --  9.40   EOSINOPHILS  --  5.90   BASOPHILS  --  0.90     Recent Labs     06/23/20 0117 06/24/20 0404 06/25/20  0335   RBC 4.60*  --  4.55*   HEMOGLOBIN 14.2  --  14.2   HEMATOCRIT 43.7  --  43.5   PLATELETCT 208  --  201   PROTHROMBTM  --   --  13.3   APTT 56.4* 57.1* 54.5*   INR  --   --  0.98       Imaging  X-Ray:  I have personally reviewed the images and compared with prior images. and My impression is: ETT and lines in appropriate position    Assessment/Plan  NSTEMI (non-ST elevated myocardial infarction) ([facility])  Assessment & Plan  With multivessel CAD  CABG 3V pm 6/25/20  I have made ventilator changes for postoperative respiratory failure on full ventilator support  Reviewed with RT and nursing in detail at bedside as well as cardiac anesthesia  Tinea with weaning efforts in the immediate perioperative period  I will follow hemoglobin, chest tube output, hemodynamics, respiratory and neurologic status closely postoperatively with all appropriate bundles in place    Hypertension  Assessment & Plan  Currently requiring nitroglycerin and Cleviprex infusion postoperatively, continue strict blood pressure control and follow    Parkinson's disease ([facility])  Assessment & Plan       COPD (chronic obstructive pulmonary disease) ([facility])  Assessment & Plan  Former smoker, unknown severity  RT protocols, bronchodilators    Pancreatitis, chronic ([facility])  Assessment & Plan  Without acute component, chronically on pancrelipase    Type 2 diabetes mellitus ([facility])  Assessment & Plan  Continue strict glycemic control  perioperatively, insulin infusion titrated       VTE:  Lovenox  Ulcer: Not Indicated  Lines: Central Line  Ongoing indication addressed    I have performed a physical exam and reviewed and updated ROS and Plan today (6/25/2020). In review of yesterday's note (6/24/2020), there are no changes except as documented above.     Discussed patient condition and risk of morbidity and/or mortality with RN, RT, Pharmacy and [facility]  The patient remains critically ill.  Critical care time = 35 minutes in directly providing and coordinating critical care and extensive data review.  No time overlap and excludes procedures.

## 2020-06-25 NOTE — ANESTHESIA PROCEDURE NOTES
Airway    Date/Time: 6/25/2020 7:45 AM  Performed by: Shlomo Mccarty M.D.  Authorized by: Shlomo Mccarty M.D.     Location:  OR  Urgency:  Elective  Difficult Airway: No    Indications for Airway Management:  Anesthesia      Spontaneous Ventilation: absent    Sedation Level:  Deep  Preoxygenated: Yes    Patient Position:  Sniffing  Final Airway Type:  Endotracheal airway  Final Endotracheal Airway:  ETT  Cuffed: Yes    Technique Used for Successful ETT Placement:  Direct laryngoscopy  Devices/Methods Used in Placement:  Intubating stylet    Insertion Site:  Oral  Blade Type:  Negra  Laryngoscope Blade/Videolaryngoscope Blade Size:  4  ETT Size (mm):  8.5  Measured from:  Teeth  ETT to Teeth (cm):  23  Placement Verified by: auscultation and capnometry    Cormack-Lehane Classification:  Grade IIa - partial view of glottis  Number of Attempts at Approach:  1

## 2020-06-25 NOTE — PROGRESS NOTES
Monitor Summary:   SB-SR : 40-60's  0.20/0.12/0.40    12 hour chart check   2 RN skin check complete

## 2020-06-25 NOTE — OR SURGEON
Operative report  PreOp Diagnosis: Non-STEMI, mi multivessel coronary disease, Parkinson's, hypertension, diabetes mellitus, chronic pancreatitis, prostatitis, COPD    PostOp Diagnosis: Same as above    Procedure(s):  CABG,   WITH ENDOSCOPIC VEIN PROCUREMENT of right greater saphenous vein- X3 - Wound Class: Clean with Drain  ECHOCARDIOGRAM, TRANSESOPHAGEAL - Wound Class: None    Surgeon(s):  ELA Huynh MD ([facility])     Anesthesiologist/Type of Anesthesia:  Anesthesiologist: Shlomo Mccarty M.D./General    Surgical Staff:  Assistant: Fabiola Bernal P.A.-C.  Circulator: Rod Ramirez R.N.; Mya Watson R.N.  Perfusionist: Ron Tom Circulator: Asha Felder R.N.  Scrub Person: Luciana Tran; Shad Noel    Specimens removed if any:  * No specimens in log *    Estimated Blood Loss: Cardiopulmonary bypass    Findings: Pre-and post procedure showed preserved ejection fraction of 65%.  No wall motion abnormalities upon completion.  EKG normal.  Patient with hypertension that was difficult control to control during and after the case.  Nitroglycerin drip started.  Patient had extensive calcification of his LAD and right coronary artery.  The right coronary artery had calcification from its origin past the takeoff of the PDA.  Upon opening the PDA was able to perform a partial thrombectomy of the most distal portion of thrombus noted on angiogram.  Flushing of the coronary was performed the proximal thrombus did not come out.  LAD was completely calcified from its origin down to the most distal diagonal takeoff.  There was 1 area with only side and posterior wall calcification prior to this bifurcation.  The LAD distal to the bifurcation was too small for bypass.  Patient tolerated bypass run without issue.  He did not require defibrillation with cross-clamp removal.  Patient right atrial tissue extremely thin-walled and friable, required repair after placement of  venous cannula    Complications: None immediate      Specimen removed: None    Condition of patient: Stable/critical    Chest tubes: 32 [place] mediastinal drains x2    Pericardium open    Pacing wires: Ventricular x2    Drains: None    Endoscopic vein harvest: Right thigh performed by Fabiola Bernal PA-C    Cross-clamp time 78 minutes    Bypass time 116 minutes    Contraindication to KAIDEN: None    Vessels    #1: LIMA to LAD: 2 mm in size LAD was completely calcified from its origin down to the distal bifurcation of the last diagonal.  There was a soft area right before this bifurcation that was adequate for anastomosis.  Distal to the last diagonal takeoff it was too small for bypass     #2 saphenous vein graft to OM1: 1.75 mm in size, minimal calcification, good target, flow measured with cardioplegia instillation, 100 mls/min at a pressure of 150 mmHg    #3: Saphenous vein graft to PDA: 1.75 mm in size, dense proximal calcifications noted, I was able to remove thrombus noted on angiogram, flows were noted to be 100 mL/min at a pressure of 150 mmHg      Cardioplegia: Antegrade cold blood cardioplegia, initial arrest with 900 and mils, redosed every 15 to 20 minutes 300 mL's    Hotshot: None    Vent: Aortic root      Procedure:    After informed consent was obtained, the patient was brought to the operating room.  He was placed in supine position on the operating table.  General anesthesia was induced via endotracheal tube.  Lines,  arterial line, Anderson were placed by the nursing and anesthesia teams.  He received pre-incision antibiotics and beta-blockade.  He was prepped in a sterile fashion from his chin to his feet.  Drapes were placed in a sterile fashion.  The procedure was begun with a timeout.    I began the procedure by making a median sternotomy incision with a 10 blade scalpel.  Concurrently, Fabiola Bernal PA-C, performed endoscopic vein harvest of the right greater saphenous vein.  After removal of the vein,  she closed these wounds and then prepped the vein on the back table for use later.    Once she finished with that portion of the procedure, she then joined me at the head of the table to assist in retraction and exposure for anastomoses and closing.  Dr. Olivas was scheduled to [facility] the case, unfortunately, he was called away for an emergency prior to starting.This was deepened to the sternum using electrocautery.  The sternum was divided in the midline using the sternal saw.  Hemostasis of the sternal edges was obtained using bone putty and electrocautery.  The mammary retractor was brought up at that point and the left hemisternum was elevated.  Using the electrocautery with occasional clips for side branches, the mammary was dissected down from its takeoff at the subclavian down to its bifurcation into the muscular phrenic and inferior epigastric.   Once the mammary was completely dissected out, systemic heparinization was performed.  After waiting 3 minutes, the mammary was ligated distally and divided.  There was excellent flow noted.  It was treated with papaverine.  I then divided it just before its distal bifurcation and spatulated the mammary for use later.  It was wrapped in a papaverine soaked sponge and placed back in the left chest for safe keeping.  The distal stump was secured with a 0 silk free tie and 2 large clips.  The mammary retractor was removed and sternal retractor was placed in the chest open.     With the sternal retractor open, I dissected out the prepericardial fat exposing the pericardium and the innominate vein.  The pericardium was Odo in a inverted T fashion.  At that point I then marsupialized the pericardium in standard fashion.  2 [place] pursestrings x2 were placed on the distal ascending aorta at the reflection of the pericardium.  Next, a 2-0 [place] was placed on the right atrial appendage.  This patient's right atrium was quite deep in his chest and the heart seem to be  rotated with the right atrium posterior.  Pursestring was placed in standard fashion.  After checking that the ACT was adequate, the aorta was cannulated in standard fashion.  The two-stage venous cannula was placed to the right atrium, but I noted he had very friable tissue.  After placing it I noted bleeding around the cannula.  Examination showed that the atrial tissue tore circumferentially.  At that time, I initiated cardiopulmonary bypass.  The patient's temperature was allowed to drift.  The cannula allowed for bypass and I positioned it to minimize air entrainment into the circuit.  I was able to use a 3-0 Prolene suture to place another pursestring around it and get good control of the cannulation site.  Next, a 3-0 Prolene suture pursestring was placed on the ascending aorta.  The antegrade needle was placed through that and connected to the circuit and the root vent.  Because of the difficulty of exposing the right atrium, I elected not to place a retrograde cardioplegia cannula.  Next, the heart was repositioned to allow me to expose the coronary arteries.  I noted the LAD on the right work diffusely and densely calcified.  The LAD had one area just before the bifurcation of the most distal diagonal that was suitable for bypass.  The PDA and the OM1 were noted to be reasonable targets.  I then brought the heart strep through the oblique and transverse sinus to allow for positioning of the heart during distal anastomoses.    I began the procedure by first, elevating the heart to expose the PDA.  The heart was then secured in place with a heart strap.  The PDA was dissected out with a [place] blade.  It was opened longitudinally with a [place] blade.  Upon opening, I noted the thrombus that was seen on angiogram.  This was removed with pickups.  The vessel was then flushed with cardioplegia as well as cold heparinized saline.  I also passed a 1.5 mm coronary probe in order to try to remove any other all  thrombus noted.  Once finished, because the patient has a proximal nearly occlusive thrombus, I elected to perform venous bypass grafting.  I discussed this in detail with the patient prior to the procedure.  Because the vessel itself does not have an obvious flow-limiting lesion outside of this proximal thrombus, this vein graft was at elevated risk for closure.  The patient understood this and I performed this bypass graft as a temporizing safety measure secondary to the loose floating thrombus in the distal right coronary artery.  The anastomosis was created with a running 7-0 Prolene suture in a running continuous fashion.  Upon completion it was checked with hand-injection of heparinized saline.  It was hemostatic and had good flow.  It was then connected to the cardioplegia circuit and flows checked there and are noted above.  Once this was finished, I disconnected the vein graft from the cardioplegia circuit and brought it around the right atrium along the pericardium up to the aorta.  I then divided it and spatulated it for use later for proximal anastomosis.  A bulldog clamp was applied at this time as well.  New    The heart was then repositioned to expose the obtuse marginal branch #1.  It was the only branch of the circumflex system that was adequate for bypass.  It was located just at its takeoff at the AV groove.  This was dissected out with a [place] blade and then opened with an 11 blade.  Anastomosis was created with a running 7-0 Prolene suture in running continuous fashion.  It was probed prior to completion with a 1.5 mm probe to ensure patency proximally distally.  It was also connected to the cardioplegia circuit, after hand-injection testing hemostasis and flow.  It had excellent flow noted on cardioplegia instillation.  I brought this around the pericardium over the pulmonary artery to the aorta for creation of a proximal anastomosis later.    I created a rent in the pericardium through which  I brought the mammary pedicle.  I again checked and noted excellent flow.  The LAD was then exposed and dissected out with a [place] blade.  It was opened with the [place] blade in a longitudinal fashion.  Arteriotomy was extended similar fashion to the other arteriotomies with [place] scissors.  An end LIMA to side LAD anastomosis was created with a running 7-0 Prolene suture in standard fashion.  Test flashing of the mammary pedicle revealed excellent flow.  The pedicle was then tacked to the epicardium using interrupted 5-0 Prolene sutures.    The aorta was distended with cardioplegia and 2 arteriotomies was created with a 11 blade scalpel and a 4 oh millimeter punch to create a proximal anastomosis sites.  Both vein grafts were anastomosed to the aorta with a 5-0 Prolene suture in a running continuous fashion and marked with coronary markers.  At that point, the vents were turned up, patient was placed in some Trendelenburg and flows are dropped.  Cross-clamp was removed.   V wires were then placed in standard fashion.  Hemostasis was checked and found to be adequate.  The patient was then sequentially decannulated.  Protamine was given to reverse heparinization.  Chest tubes were placed into the mediastinum via the epigastrium.  The sternum was reapproximated with #7 stainless steel wires.  The wound itself was then closed in layers using absorbable sutures.  Sterile dressings were applied.  The patient tolerated procedure well.  All instrument counts were correct x2.  He finished the case on no epinephrine or vasopressors.  He was taken to the ICU in guarded/stable condition.                  6/25/2020 12:05 PM Mychal Linda D.O.

## 2020-06-25 NOTE — ASSESSMENT & PLAN NOTE
With multivessel CAD  CABG 3V pm 6/25/20  I have made ventilator changes for postoperative respiratory failure on full ventilator support  Reviewed with RT and nursing in detail at bedside as well as cardiac anesthesia  Tinea with weaning efforts in the immediate perioperative period  I will follow hemoglobin, chest tube output, hemodynamics, respiratory and neurologic status closely postoperatively with all appropriate bundles in place

## 2020-06-26 ENCOUNTER — APPOINTMENT (OUTPATIENT)
Dept: RADIOLOGY | Facility: MEDICAL CENTER | Age: 70
DRG: 233 | End: 2020-06-26
Attending: PHYSICIAN ASSISTANT
Payer: MEDICARE

## 2020-06-26 LAB
ANION GAP SERPL CALC-SCNC: 11 MMOL/L (ref 7–16)
BUN SERPL-MCNC: 14 MG/DL (ref 8–22)
CALCIUM SERPL-MCNC: 8.5 MG/DL (ref 8.5–10.5)
CHLORIDE SERPL-SCNC: 103 MMOL/L (ref 96–112)
CO2 SERPL-SCNC: 22 MMOL/L (ref 20–33)
CREAT SERPL-MCNC: 1.02 MG/DL (ref 0.5–1.4)
EKG IMPRESSION: NORMAL
ERYTHROCYTE [DISTWIDTH] IN BLOOD BY AUTOMATED COUNT: 48.9 FL (ref 35.9–50)
GLUCOSE BLD-MCNC: 105 MG/DL (ref 65–99)
GLUCOSE BLD-MCNC: 106 MG/DL (ref 65–99)
GLUCOSE BLD-MCNC: 107 MG/DL (ref 65–99)
GLUCOSE BLD-MCNC: 107 MG/DL (ref 65–99)
GLUCOSE BLD-MCNC: 111 MG/DL (ref 65–99)
GLUCOSE BLD-MCNC: 125 MG/DL (ref 65–99)
GLUCOSE BLD-MCNC: 131 MG/DL (ref 65–99)
GLUCOSE BLD-MCNC: 132 MG/DL (ref 65–99)
GLUCOSE BLD-MCNC: 82 MG/DL (ref 65–99)
GLUCOSE BLD-MCNC: 95 MG/DL (ref 65–99)
GLUCOSE SERPL-MCNC: 109 MG/DL (ref 65–99)
HCT VFR BLD AUTO: 35.6 % (ref 42–52)
HGB BLD-MCNC: 11.7 G/DL (ref 14–18)
MCH RBC QN AUTO: 31.5 PG (ref 27–33)
MCHC RBC AUTO-ENTMCNC: 32.9 G/DL (ref 33.7–35.3)
MCV RBC AUTO: 95.7 FL (ref 81.4–97.8)
PLATELET # BLD AUTO: 138 K/UL (ref 164–446)
PMV BLD AUTO: 10 FL (ref 9–12.9)
POTASSIUM SERPL-SCNC: 4.8 MMOL/L (ref 3.6–5.5)
RBC # BLD AUTO: 3.72 M/UL (ref 4.7–6.1)
SODIUM SERPL-SCNC: 136 MMOL/L (ref 135–145)
WBC # BLD AUTO: 16.9 K/UL (ref 4.8–10.8)

## 2020-06-26 PROCEDURE — 770020 HCHG ROOM/CARE - TELE (206)

## 2020-06-26 PROCEDURE — 82962 GLUCOSE BLOOD TEST: CPT

## 2020-06-26 PROCEDURE — 700111 HCHG RX REV CODE 636 W/ 250 OVERRIDE (IP): Performed by: PHYSICIAN ASSISTANT

## 2020-06-26 PROCEDURE — 700102 HCHG RX REV CODE 250 W/ 637 OVERRIDE(OP): Performed by: STUDENT IN AN ORGANIZED HEALTH CARE EDUCATION/TRAINING PROGRAM

## 2020-06-26 PROCEDURE — 93010 ELECTROCARDIOGRAM REPORT: CPT | Performed by: INTERNAL MEDICINE

## 2020-06-26 PROCEDURE — 700102 HCHG RX REV CODE 250 W/ 637 OVERRIDE(OP): Performed by: PHYSICIAN ASSISTANT

## 2020-06-26 PROCEDURE — A9270 NON-COVERED ITEM OR SERVICE: HCPCS | Performed by: STUDENT IN AN ORGANIZED HEALTH CARE EDUCATION/TRAINING PROGRAM

## 2020-06-26 PROCEDURE — 71045 X-RAY EXAM CHEST 1 VIEW: CPT

## 2020-06-26 PROCEDURE — 80048 BASIC METABOLIC PNL TOTAL CA: CPT

## 2020-06-26 PROCEDURE — 85027 COMPLETE CBC AUTOMATED: CPT

## 2020-06-26 PROCEDURE — 93005 ELECTROCARDIOGRAM TRACING: CPT | Performed by: PHYSICIAN ASSISTANT

## 2020-06-26 PROCEDURE — 700102 HCHG RX REV CODE 250 W/ 637 OVERRIDE(OP): Performed by: CLINICAL NURSE SPECIALIST

## 2020-06-26 PROCEDURE — A9270 NON-COVERED ITEM OR SERVICE: HCPCS | Performed by: PHYSICIAN ASSISTANT

## 2020-06-26 PROCEDURE — 99024 POSTOP FOLLOW-UP VISIT: CPT | Performed by: THORACIC SURGERY (CARDIOTHORACIC VASCULAR SURGERY)

## 2020-06-26 RX ORDER — FUROSEMIDE 10 MG/ML
20 INJECTION INTRAMUSCULAR; INTRAVENOUS
Status: DISCONTINUED | OUTPATIENT
Start: 2020-06-26 | End: 2020-06-28

## 2020-06-26 RX ORDER — POTASSIUM CHLORIDE 20 MEQ/1
20 TABLET, EXTENDED RELEASE ORAL DAILY
Status: DISCONTINUED | OUTPATIENT
Start: 2020-06-26 | End: 2020-06-28

## 2020-06-26 RX ADMIN — ASPIRIN 81 MG: 81 TABLET, COATED ORAL at 05:22

## 2020-06-26 RX ADMIN — METOPROLOL TARTRATE 12.5 MG: 25 TABLET, FILM COATED ORAL at 05:23

## 2020-06-26 RX ADMIN — GABAPENTIN 300 MG: 300 CAPSULE ORAL at 18:12

## 2020-06-26 RX ADMIN — ACETAMINOPHEN 1000 MG: 500 TABLET ORAL at 05:23

## 2020-06-26 RX ADMIN — ONDANSETRON 8 MG: 2 INJECTION INTRAMUSCULAR; INTRAVENOUS at 07:57

## 2020-06-26 RX ADMIN — ACETAMINOPHEN 1000 MG: 500 TABLET ORAL at 23:25

## 2020-06-26 RX ADMIN — OXYCODONE HYDROCHLORIDE 10 MG: 10 TABLET ORAL at 21:05

## 2020-06-26 RX ADMIN — OXYCODONE HYDROCHLORIDE 10 MG: 10 TABLET ORAL at 03:39

## 2020-06-26 RX ADMIN — FUROSEMIDE 20 MG: 10 INJECTION, SOLUTION INTRAMUSCULAR; INTRAVENOUS at 10:10

## 2020-06-26 RX ADMIN — PANCRELIPASE 6000 UNITS: 30000; 6000; 19000 CAPSULE, DELAYED RELEASE PELLETS ORAL at 12:26

## 2020-06-26 RX ADMIN — INSULIN HUMAN 3 UNITS: 100 INJECTION, SUSPENSION SUBCUTANEOUS at 21:11

## 2020-06-26 RX ADMIN — INSULIN HUMAN 3 UNITS: 100 INJECTION, SUSPENSION SUBCUTANEOUS at 10:15

## 2020-06-26 RX ADMIN — ACETAMINOPHEN 1000 MG: 500 TABLET ORAL at 12:26

## 2020-06-26 RX ADMIN — PANCRELIPASE 6000 UNITS: 30000; 6000; 19000 CAPSULE, DELAYED RELEASE PELLETS ORAL at 07:57

## 2020-06-26 RX ADMIN — ALUMINUM HYDROXIDE, MAGNESIUM HYDROXIDE,SIMETHICONE 30 ML: 400; 400; 40 LIQUID ORAL at 19:52

## 2020-06-26 RX ADMIN — POLYETHYLENE GLYCOL 3350 1 PACKET: 17 POWDER, FOR SOLUTION ORAL at 05:21

## 2020-06-26 RX ADMIN — GABAPENTIN 300 MG: 300 CAPSULE ORAL at 05:22

## 2020-06-26 RX ADMIN — CARBIDOPA AND LEVODOPA 1 TABLET: 25; 100 TABLET ORAL at 05:25

## 2020-06-26 RX ADMIN — POTASSIUM CHLORIDE 20 MEQ: 1500 TABLET, EXTENDED RELEASE ORAL at 10:10

## 2020-06-26 RX ADMIN — DOCUSATE SODIUM 50 MG AND SENNOSIDES 8.6 MG 2 TABLET: 8.6; 5 TABLET, FILM COATED ORAL at 05:22

## 2020-06-26 RX ADMIN — PANCRELIPASE 6000 UNITS: 30000; 6000; 19000 CAPSULE, DELAYED RELEASE PELLETS ORAL at 18:12

## 2020-06-26 RX ADMIN — CLOPIDOGREL BISULFATE 75 MG: 75 TABLET ORAL at 05:22

## 2020-06-26 RX ADMIN — METOPROLOL TARTRATE 12.5 MG: 25 TABLET, FILM COATED ORAL at 18:12

## 2020-06-26 RX ADMIN — OMEPRAZOLE 20 MG: 20 CAPSULE, DELAYED RELEASE ORAL at 05:22

## 2020-06-26 RX ADMIN — TAMSULOSIN HYDROCHLORIDE 0.4 MG: 0.4 CAPSULE ORAL at 05:22

## 2020-06-26 RX ADMIN — VALACYCLOVIR HYDROCHLORIDE 1000 MG: 500 TABLET, FILM COATED ORAL at 05:26

## 2020-06-26 RX ADMIN — ATORVASTATIN CALCIUM 80 MG: 80 TABLET, FILM COATED ORAL at 18:12

## 2020-06-26 RX ADMIN — CARBIDOPA AND LEVODOPA 1 TABLET: 25; 100 TABLET ORAL at 12:26

## 2020-06-26 RX ADMIN — MAGNESIUM SULFATE 1 G: 1 INJECTION INTRAVENOUS at 05:19

## 2020-06-26 RX ADMIN — MUPIROCIN 1 APPLICATION: 20 OINTMENT TOPICAL at 05:24

## 2020-06-26 RX ADMIN — CARBIDOPA AND LEVODOPA 1 TABLET: 25; 100 TABLET ORAL at 18:20

## 2020-06-26 RX ADMIN — ONDANSETRON 8 MG: 2 INJECTION INTRAMUSCULAR; INTRAVENOUS at 21:53

## 2020-06-26 RX ADMIN — SULFAMETHOXAZOLE AND TRIMETHOPRIM 1 TABLET: 800; 160 TABLET ORAL at 18:12

## 2020-06-26 RX ADMIN — MUPIROCIN 1 APPLICATION: 20 OINTMENT TOPICAL at 18:20

## 2020-06-26 RX ADMIN — SULFAMETHOXAZOLE AND TRIMETHOPRIM 1 TABLET: 800; 160 TABLET ORAL at 05:25

## 2020-06-26 RX ADMIN — OXYCODONE HYDROCHLORIDE 10 MG: 10 TABLET ORAL at 18:18

## 2020-06-26 RX ADMIN — OXYCODONE HYDROCHLORIDE 10 MG: 10 TABLET ORAL at 10:43

## 2020-06-26 RX ADMIN — ACETAMINOPHEN 1000 MG: 500 TABLET ORAL at 18:12

## 2020-06-26 RX ADMIN — ENOXAPARIN SODIUM 40 MG: 40 INJECTION SUBCUTANEOUS at 18:13

## 2020-06-26 RX ADMIN — PAROXETINE HYDROCHLORIDE 40 MG: 20 TABLET, FILM COATED ORAL at 05:22

## 2020-06-26 RX ADMIN — FINASTERIDE 5 MG: 5 TABLET, FILM COATED ORAL at 05:22

## 2020-06-26 ASSESSMENT — ENCOUNTER SYMPTOMS
BLOOD IN STOOL: 0
HALLUCINATIONS: 0
FEVER: 0
PHOTOPHOBIA: 0
DIZZINESS: 0
PND: 0
EYE PAIN: 0
ORTHOPNEA: 0
HEADACHES: 0
TREMORS: 0
HEMOPTYSIS: 0
ABDOMINAL PAIN: 0
FOCAL WEAKNESS: 0
POLYDIPSIA: 0
SEIZURES: 0
NAUSEA: 1
SHORTNESS OF BREATH: 0
SPEECH CHANGE: 0
BRUISES/BLEEDS EASILY: 0
COUGH: 0
PALPITATIONS: 0
MEMORY LOSS: 0
CHILLS: 0
DOUBLE VISION: 0
VOMITING: 0

## 2020-06-26 NOTE — PROGRESS NOTES
Problem: Post Op Day 1 CABG/Heart Valve Replacement  Goal: Optimal care of the post op CABG/heart valve replacement Post Op Day 1  Outcome: PROGRESSING  AS EXPECTED  Intervention: EKG and CXR completed  Note: Met  Intervention: Daily Weights  Note: Met  Intervention: Up in chair for all meals  Note: Met  Intervention: Discontinue victor catheter unless documented reason for continuation  Note: Discussed with PA.  To remain in place  Intervention: Remove original surgical dressing after 24 hrs, leave open to air unless otherwise specified by physician  Note: Met  Intervention: IS q 1 hour while awake and record best IS volume  Note: 2600  Intervention: Graduated elastic stockings  Note:  Met  Intervention: After 24th hour post-anesthesia end time, transition patient to Cardiac Surgery SQ Insulin Protocol  Note: Met, transition completed at 1200  Intervention: If patient is CABG or on home beta-blocker, start Beta-Blocker on POD 1 or POD 2 or contraindication documented  Note: Not yet initiated

## 2020-06-26 NOTE — PROGRESS NOTES
Monitor Summary-    Rhythm: SR with RBBB   Rate: 60-70's  .16/.12/.40    No pacing required throughout shift.        12 hour chart check

## 2020-06-26 NOTE — CARE PLAN
Problem: Day of surgery post CABG/Heart valve replacement  Goal: Stabilization in immediate post op period  Intervention: Out of bed, dangle 4 hours post extubation  Note: EOB at 2000  Intervention: Up in chair 4 hours, day of extubation  Note: Patient up in chair this morning for breakfast  Intervention: Maintain all original surgical dressings for 24 hours  Note: Island dressing to be removed during day shift today.  Intervention: Clear liquids post extubation, advance as tolerated  Note: Patient nausea. Clear liquid diet will advance as tolerated  Intervention: Discontinue [place] jonathan and arterial line 12-18 hours post op if hemodynamically stable and off vasoactive drips  Note: Art Line discontinued and off vasopressors.  Intervention: A-Fib and DVT prophylaxis per MD order or contraindications documented (refer to DVT/VTE problem on Care Plan)  Note: DVT/VTE prophylaxis in place  Intervention: Amiodarone protocol per MD order  Note: NA

## 2020-06-26 NOTE — CARE PLAN
Problem: Day of surgery post CABG/Heart valve replacement  Goal: Stabilization in immediate post op period  Intervention: VS q 15 min x 4 hours, then q 1 hour. Include temperature immediately upon arrival. Check CO/CI q 2-4 hours and PRN  Note: See flowsheets.  Intervention: If radial artery used, elevate arm, no BP checks or needle sticks from affected arm, monitor ulnar pulse and capillary refill  Note: Left radial used for art line  Intervention: First post op hour labs and diagnostics per MD order  Note: Completed in epic  Intervention: Serum K q 6 hours x 24 hours.  ABG and CBC prn.  Note: No replacement K+ required. Most recent was 5.2  Intervention: For FSBS greater than 130, start Post Cardiac Surgery Insulin Drip Protocol  Note: Patient arrived on insulin drip  Intervention: FSBS frequency as per Cardiac Surgery Insulin Drip Protocol  Note: Every hour blood sugar checks at this time  Intervention: For patients on Beta Blockers: verify dose given prior to surgery or within 6 hours after arrival to the unit  Note: Patient on nitroglycerin drip  Intervention: Chest tube to 20 cm suction, record CT drainage with VS  Note: Total chest tube output by 1800 was 138. Patent with no air leak  Intervention: For CT drainage > 300 cc in first post op hour and/or 150 cc in subsequent hours: platelets, coag screen, fibrinogen, H&H per order  Note: N/A  Intervention: Titrate and wean off vasoactive drips per patient's condition and per MD order while maintaining SBP  mmHg per MD order  Note: Patient on nitroglycerin drip  Intervention: VAP protocol in place  Note: Completed and patient now etubated  Intervention: Wean from vent per protocol (see protocol), extubation goal with 2-6 hours post op.  Note: Extubated at 1617.  Intervention: IS q 1 hour while awake post extubation  Note: Best IS of 2250  Intervention: Bedrest until extubated and groin lines out  Note: No groin lines in. Patient to dangle at EOB at  2017.  Intervention: Out of bed, dangle 4 hours post extubation  Note: EOB around 2000  Intervention: Up in chair 4 hours, day of extubation  Note: Patient to be in chair around 2000  Intervention: Maintain all original surgical dressings for 24 hours  Note: Island dressing in place on mediastinum as well as ACE wrap for EVH  Intervention: Clear liquids post extubation, advance as tolerated  Note: Clear liquids ordered  Intervention: Discontinue [place] jonathan and arterial line 12-18 hours post op if hemodynamically stable and off vasoactive drips  Note: No swan in place. Patient on nitro drip currently.  Intervention: A-Fib and DVT prophylaxis per MD order or contraindications documented (refer to DVT/VTE problem on Care Plan)  Note: SCDs on patient currently.  Intervention: Amiodarone protocol per MD order  Note: N/A at this time

## 2020-06-26 NOTE — DISCHARGE PLANNING
Care Transition Team Assessment  NOK spouse, Brenda, @ [phone].  Lsw spoke to pt at bedside and collected information below. Pt is independent w/ I/ADLs. Pt has PCP Dr Rodríguez @ [facility], and uses their pharmacy as well.     Pt is newly dxed w/ Parkinson's and is concerned about his future abilities to remain independent w/ ambulation. Pt has no DME at home. Pt lives w/ his wife and 46yo son. 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 Pt has been dxed w/ ANX and Dep and prescribed meds. Pt sees psychologist Frankie Garcia at [facility].    Pt indicates he has no need for resources or services at this time.     Pt 2ndary INS is [facility]. He would like Lsw to f/u w/ [facility] and his wife to acquire the information for face sheet. Lsw called [facility] and they will call pt's wife to acquire his 2ndary INS info.        Information Source  Orientation : Oriented x 4  Information Given By: Patient  Informant's Name: Ricky  Who is responsible for making decisions for patient? : Patient    Readmission Evaluation  Is this a readmission?: (yes recent admit [facility])    Elopement Risk  Legal Hold: No  Ambulatory or Self Mobile in Wheelchair: Yes  Disoriented: No  Psychiatric Symptoms: None  History of Wandering: No  Elopement this Admit: No  Vocalizing Wanting to Leave: No  Displays Behaviors, Body Language Wanting to Leave: No-Not at Risk for Elopement  Elopement Risk: Not at Risk for Elopement    Interdisciplinary Discharge Planning  Primary Care Physician: [facility], Dr. Rodríguez  Lives with - Patient's Self Care Capacity: Spouse, Adult Children(wife and son 46yo)  Patient or legal guardian wants to designate a caregiver (see row info): No  Support Systems: Children, Family Member(s), Friends / Neighbors, Spouse / Significant Other  Housing / Facility: [address], Mobile Home  Name of Care  Facility: (part  and part house)  Do You Take your Prescribed Medications Regularly: Yes  Mobility Issues: No  Prior Services: (PCP and pharmacy at [facility])  Patient Expects to be Discharged to:: home w/ wife and son  Durable Medical Equipment: Not Applicable    Discharge Preparedness  What is your plan after discharge?: Home with help  What are your discharge supports?: Child, Spouse  Prior Functional Level: Ambulatory, Drives Self, Independent with Activities of Daily Living, Independent with Medication Management    Functional Assesment  Prior Functional Level: Ambulatory, Drives Self, Independent with Activities of Daily Living, Independent with Medication Management    Finances  Prescription Coverage: Yes(Medicare and [employer]-[facility] will look up)    Vision / Hearing Impairment  Vision Impairment : No  Hearing Impairment : No         Advance Directive  Advance Directive?: (says wife is POA and d/k were copy is located)    Domestic Abuse  Have you ever been the victim of abuse or violence?: No  Physical Abuse or Sexual Abuse: No  Verbal Abuse or Emotional Abuse: No  Possible Abuse Reported to:: Not Applicable    Psychological Assessment  History of Substance Abuse: (sober ETOH 15yrs, stopped nicotene 20yo)  History of Psychiatric Problems: (dxes ANX and Dep w/ meds prescribed by MD in past)  Newly Diagnosed Illness: (parkinsons new dx concerend about futr independenc-ambulatn)         Anticipated Discharge Information  Anticipated discharge disposition: Discharge needs currently unknown, Home  Discharge Address: mail po box on face sheet, physical [address] [id number]

## 2020-06-26 NOTE — PROGRESS NOTES
Cardiovascular Surgery Progress Note    Name: Ricky Spangler  MRN: [id number]  : 1950  Admit Date: 2020  6:15 AM  Procedure:  Procedure(s) and Anesthesia Type:     * CABG, WITH ENDOSCOPIC VEIN PROCUREMENT- X3 - General     * ECHOCARDIOGRAM, TRANSESOPHAGEAL - General  1 Day Post-Op    Vitals:  Patient Vitals for the past 8 hrs:   Temp Monitored Temp 2 SpO2 O2 Delivery Device O2 (LPM) Pulse Resp BP   20 0800 37.2 °C (99 °F) -- -- Silicone Nasal Cannula 3 -- -- --   20 0700 37.1 °C (98.8 °F) -- -- -- -- -- -- --   20 0600 37.3 °C (99.1 °F) 37.3 °C (99.1 °F) 96 % Silicone Nasal Cannula 2 73 12 (!) 99/55   20 0525 -- -- -- -- -- 79 -- 20 0523 -- -- -- -- -- 82 -- 20 0500 -- 37.3 °C (99.1 °F) 93 % -- -- 77 15 11120 0400 36.8 °C (98.2 °F) 36.8 °C (98.2 °F) 95 % -- 2 74 20 --   20 0300 -- 36.9 °C (98.4 °F) 95 % -- -- 61 15 (!) 96/50   20 0200 36.9 °C (98.4 °F) 36.9 °C (98.4 °F) 95 % Silicone Nasal Cannula 2 65 (!) 9 (!) 99/48     Temp (24hrs), Av.7 °C (98.1 °F), Min:35 °C (95 °F), Max:37.3 °C (99.1 °F)      Respiratory:  Vent Mode: Spont, PEEP/CPAP: 8, PIP: 16, MAP: 10 Respiration: 12, Pulse Oximetry: 96 %     Chest Tube Drains:          Fluids:    Intake/Output Summary (Last 24 hours) at 2020 0919  Last data filed at 2020 0800  Gross per 24 hour   Intake 4545.65 ml   Output 2975 ml   Net 1570.65 ml     Admit weight: Weight: 82 kg (180 lb 12.4 oz)  Current weight: Weight: 77.1 kg (169 lb 15.6 oz) (20 1600)    Labs:  Recent Labs     20  0335 20  0515   WBC 8.2 16.9*   RBC 4.55* 3.72*   HEMOGLOBIN 14.2 11.7*   HEMATOCRIT 43.5 35.6*   MCV 95.6 95.7   MCH 31.2 31.5   MCHC 32.6* 32.9*   RDW 48.8 48.9   PLATELETCT 201 138*   MPV 9.9 10.0     Recent Labs     20  0335   NEUTSPOLYS 54.50   LYMPHOCYTES 28.90   MONOCYTES 9.40   EOSINOPHILS 5.90   BASOPHILS 0.90     Recent Labs     20  0404  06/25/20  0335 06/25/20  1652 06/25/20  2300 06/26/20  0515   SODIUM 135 137  --   --  136   POTASSIUM 4.3 4.6 5.2 5.4 4.8   CHLORIDE 103 103  --   --  103   CO2 24 24  --   --  22   GLUCOSE 112* 110*  --   --  109*   BUN 10 14  --   --  14   CREATININE 1.16 1.21  --   --  1.02   CALCIUM 8.8 8.9  --   --  8.5     Recent Labs     06/24/20  0404 06/25/20  0335   APTT 57.1* 54.5*   INR  --  0.98       Medications:  • insulin lispro  2 Units     • insulin NPH  3 Units     • insulin lispro  0-10 Units     • magnesium sulfate  1 g Stopped (06/26/20 0619)   • K+ Scale: Goal of 4.5  1 Each     • Pharmacy Consult Request  1 Each     • acetaminophen  1,000 mg     • gabapentin  300 mg      Followed by   • [START ON 7/1/2020] gabapentin  100 mg     • senna-docusate  2 Tab      And   • polyethylene glycol/lytes  1 Packet      And   • [START ON 6/27/2020] magnesium hydroxide  30 mL     • enoxaparin  40 mg     • mupirocin  1 Application     • metoprolol  12.5 mg      Followed by   • [START ON 6/27/2020] metoprolol  25 mg     • aspirin EC  81 mg     • clopidogrel  75 mg     • insulin regular  0-14 Units     • atorvastatin  80 mg     • omeprazole  20 mg     • carbidopa-levodopa  1 Tab     • finasteride  5 mg     • pancrelipase (Lip-Prot-Amyl)  1 Cap     • PARoxetine  40 mg     • tamsulosin  0.4 mg     • valACYclovir  1,000 mg     • sulfamethoxazole-trimethoprim  1 Tab          Ordered Medications:    ASA - Yes    Plavix - Yes    Post-operative Beta Blockers - Yes    Ace Inhibitor - No; contraindicated because of Normal EF    Statin - Yes    Exam:   Review of Systems   Constitutional: Positive for malaise/fatigue. Negative for chills and fever.   HENT: Negative for ear pain, nosebleeds and tinnitus.    Eyes: Negative for double vision, photophobia and pain.   Respiratory: Negative for cough, hemoptysis and shortness of breath.    Cardiovascular: Negative for chest pain, palpitations, orthopnea, leg swelling and PND.    Gastrointestinal: Positive for nausea. Negative for abdominal pain, blood in stool and vomiting.   Genitourinary: Negative for frequency, hematuria and urgency.   Musculoskeletal: Positive for joint pain.   Skin: Negative for rash.   Neurological: Negative for dizziness, tremors, speech change, focal weakness, seizures and headaches.   Endo/Heme/Allergies: Negative for polydipsia. Does not bruise/bleed easily.   Psychiatric/Behavioral: Negative for hallucinations and memory loss.       Physical Exam  Vitals signs and nursing note reviewed.   Constitutional:       Appearance: Normal appearance.   HENT:      Head: Normocephalic and atraumatic.      Nose: Nose normal.      Mouth/Throat:      Mouth: Mucous membranes are moist.      Pharynx: Oropharynx is clear.   Eyes:      Extraocular Movements: Extraocular movements intact.      Conjunctiva/sclera: Conjunctivae normal.      Pupils: Pupils are equal, round, and reactive to light.   Neck:      Musculoskeletal: Normal range of motion and neck supple.   Cardiovascular:      Rate and Rhythm: Normal rate and regular rhythm.      Pulses: Normal pulses.   Pulmonary:      Effort: Pulmonary effort is normal.      Comments: Decreased in the bases bilaterally  Chest:      Chest wall: Tenderness present.   Abdominal:      General: Abdomen is flat. Bowel sounds are normal.      Palpations: Abdomen is soft.   Musculoskeletal: Normal range of motion.      Right lower leg: Edema present.      Left lower leg: Edema present.   Skin:     General: Skin is warm and dry.      Coloration: Skin is not jaundiced.      Findings: No rash.   Neurological:      General: No focal deficit present.      Mental Status: He is alert and oriented to person, place, and time. Mental status is at baseline.   Psychiatric:         Mood and Affect: Mood normal.         Quality Measures:   Quality-Core Measures   Reviewed items::  EKG reviewed, Radiology images reviewed, Labs reviewed and Medications  reviewed  Victor catheter::  One or Two Days Post Surgery (Day of Surgery being Day 0)  DVT prophylaxis pharmacological::  Enoxaparin (Lovenox)  DVT prophylaxis - mechanical:  Not indicated at this time, ambulatory  Ulcer Prophylaxis::  No      Assessment/Plan:  POD #1:  HDS.  Off all gtts.  Moderate chest tube output overnight.  Pain controlled.  Patient nauseated. Improved after BM.  Wounds CDI.  Weight registered as down??  Fluid positive.   Plan:  Keep mediastinal tubes.  Keep victor for strict I/O.  Begin gentle diuresis.  Up and out of bed as tolerated.    Active Hospital Problems    Diagnosis   • NSTEMI (non-ST elevated myocardial infarction) ([facility]) [I21.4]     Priority: High   • Parkinson's disease ([facility]) [G20]     Priority: Medium   • Hypertension [I10]     Priority: Medium   • Type 2 diabetes mellitus ([facility]) [E11.9]     Priority: Low   • Pancreatitis, chronic ([facility]) [K86.1]     Priority: Low   • Prostatitis [N41.9]     Priority: Low   • BPH (benign prostatic hyperplasia) [N40.0]     Priority: Low   • COPD (chronic obstructive pulmonary disease) ([facility]) [J44.9]     Priority: Low

## 2020-06-26 NOTE — ANESTHESIA POSTPROCEDURE EVALUATION
Patient: Ricky Spangler    Procedure Summary     Date:  06/25/20 Room / Location:  [facility] OR 02 / SURGERY [facility]    Anesthesia Start:  0730 Anesthesia Stop:  1232    Procedures:       CABG, WITH ENDOSCOPIC VEIN PROCUREMENT- X3 (Chest)      ECHOCARDIOGRAM, TRANSESOPHAGEAL (Esophagus) Diagnosis:  (CAD)    Surgeon:  Mychal Linda D.O. Responsible Provider:  Shlomo Mccarty M.D.    Anesthesia Type:  general ASA Status:  3          Final Anesthesia Type: general  Last vitals  BP   Blood Pressure : (!) 99/55, Arterial BP: (!) 98/50    Temp   37.1 °C (98.8 °F)    Pulse   Pulse: 73   Resp   12    SpO2   96 %      Anesthesia Post Evaluation    Patient location during evaluation: ICU  Patient participation: complete - patient participated  Level of consciousness: awake and alert    Airway patency: patent  Anesthetic complications: no  Cardiovascular status: hemodynamically stable  Respiratory status: acceptable  Hydration status: euvolemic    PONV: none           Nurse Pain Score: 0 (NPRS)

## 2020-06-27 LAB
ANION GAP SERPL CALC-SCNC: 9 MMOL/L (ref 7–16)
BUN SERPL-MCNC: 16 MG/DL (ref 8–22)
CALCIUM SERPL-MCNC: 8.7 MG/DL (ref 8.5–10.5)
CHLORIDE SERPL-SCNC: 98 MMOL/L (ref 96–112)
CO2 SERPL-SCNC: 28 MMOL/L (ref 20–33)
CREAT SERPL-MCNC: 1.09 MG/DL (ref 0.5–1.4)
ERYTHROCYTE [DISTWIDTH] IN BLOOD BY AUTOMATED COUNT: 47.8 FL (ref 35.9–50)
GLUCOSE BLD-MCNC: 104 MG/DL (ref 65–99)
GLUCOSE BLD-MCNC: 107 MG/DL (ref 65–99)
GLUCOSE BLD-MCNC: 110 MG/DL (ref 65–99)
GLUCOSE BLD-MCNC: 126 MG/DL (ref 65–99)
GLUCOSE BLD-MCNC: 127 MG/DL (ref 65–99)
GLUCOSE BLD-MCNC: 136 MG/DL (ref 65–99)
GLUCOSE SERPL-MCNC: 115 MG/DL (ref 65–99)
HCT VFR BLD AUTO: 34.6 % (ref 42–52)
HGB BLD-MCNC: 11.3 G/DL (ref 14–18)
MCH RBC QN AUTO: 31 PG (ref 27–33)
MCHC RBC AUTO-ENTMCNC: 32.7 G/DL (ref 33.7–35.3)
MCV RBC AUTO: 95.1 FL (ref 81.4–97.8)
PLATELET # BLD AUTO: 131 K/UL (ref 164–446)
PMV BLD AUTO: 10.1 FL (ref 9–12.9)
POTASSIUM SERPL-SCNC: 4.8 MMOL/L (ref 3.6–5.5)
RBC # BLD AUTO: 3.64 M/UL (ref 4.7–6.1)
SODIUM SERPL-SCNC: 135 MMOL/L (ref 135–145)
WBC # BLD AUTO: 14.1 K/UL (ref 4.8–10.8)

## 2020-06-27 PROCEDURE — 97162 PT EVAL MOD COMPLEX 30 MIN: CPT

## 2020-06-27 PROCEDURE — A9270 NON-COVERED ITEM OR SERVICE: HCPCS | Performed by: PHYSICIAN ASSISTANT

## 2020-06-27 PROCEDURE — 82962 GLUCOSE BLOOD TEST: CPT

## 2020-06-27 PROCEDURE — 99024 POSTOP FOLLOW-UP VISIT: CPT | Performed by: THORACIC SURGERY (CARDIOTHORACIC VASCULAR SURGERY)

## 2020-06-27 PROCEDURE — 94669 MECHANICAL CHEST WALL OSCILL: CPT

## 2020-06-27 PROCEDURE — 700111 HCHG RX REV CODE 636 W/ 250 OVERRIDE (IP): Performed by: PHYSICIAN ASSISTANT

## 2020-06-27 PROCEDURE — 700102 HCHG RX REV CODE 250 W/ 637 OVERRIDE(OP): Performed by: PHYSICIAN ASSISTANT

## 2020-06-27 PROCEDURE — 770020 HCHG ROOM/CARE - TELE (206)

## 2020-06-27 PROCEDURE — 85027 COMPLETE CBC AUTOMATED: CPT

## 2020-06-27 PROCEDURE — A9270 NON-COVERED ITEM OR SERVICE: HCPCS | Performed by: STUDENT IN AN ORGANIZED HEALTH CARE EDUCATION/TRAINING PROGRAM

## 2020-06-27 PROCEDURE — 94760 N-INVAS EAR/PLS OXIMETRY 1: CPT

## 2020-06-27 PROCEDURE — 700102 HCHG RX REV CODE 250 W/ 637 OVERRIDE(OP): Performed by: STUDENT IN AN ORGANIZED HEALTH CARE EDUCATION/TRAINING PROGRAM

## 2020-06-27 PROCEDURE — 80048 BASIC METABOLIC PNL TOTAL CA: CPT

## 2020-06-27 RX ORDER — PANTOPRAZOLE SODIUM 40 MG/10ML
40 INJECTION, POWDER, LYOPHILIZED, FOR SOLUTION INTRAVENOUS DAILY
Status: DISCONTINUED | OUTPATIENT
Start: 2020-06-28 | End: 2020-06-28

## 2020-06-27 RX ADMIN — OXYCODONE HYDROCHLORIDE 10 MG: 10 TABLET ORAL at 09:09

## 2020-06-27 RX ADMIN — POTASSIUM CHLORIDE 20 MEQ: 1500 TABLET, EXTENDED RELEASE ORAL at 05:02

## 2020-06-27 RX ADMIN — ATORVASTATIN CALCIUM 80 MG: 80 TABLET, FILM COATED ORAL at 17:23

## 2020-06-27 RX ADMIN — INSULIN HUMAN 3 UNITS: 100 INJECTION, SUSPENSION SUBCUTANEOUS at 13:38

## 2020-06-27 RX ADMIN — PANCRELIPASE 6000 UNITS: 30000; 6000; 19000 CAPSULE, DELAYED RELEASE PELLETS ORAL at 08:04

## 2020-06-27 RX ADMIN — MAGNESIUM SULFATE 1 G: 1 INJECTION INTRAVENOUS at 05:18

## 2020-06-27 RX ADMIN — ONDANSETRON 8 MG: 2 INJECTION INTRAMUSCULAR; INTRAVENOUS at 13:23

## 2020-06-27 RX ADMIN — ACETAMINOPHEN 1000 MG: 500 TABLET ORAL at 17:24

## 2020-06-27 RX ADMIN — MUPIROCIN 1 APPLICATION: 20 OINTMENT TOPICAL at 05:03

## 2020-06-27 RX ADMIN — INSULIN HUMAN 3 UNITS: 100 INJECTION, SUSPENSION SUBCUTANEOUS at 05:13

## 2020-06-27 RX ADMIN — OXYCODONE HYDROCHLORIDE 10 MG: 10 TABLET ORAL at 20:42

## 2020-06-27 RX ADMIN — OMEPRAZOLE 20 MG: 20 CAPSULE, DELAYED RELEASE ORAL at 05:02

## 2020-06-27 RX ADMIN — ACETAMINOPHEN 1000 MG: 500 TABLET ORAL at 13:19

## 2020-06-27 RX ADMIN — METOPROLOL TARTRATE 25 MG: 25 TABLET, FILM COATED ORAL at 05:02

## 2020-06-27 RX ADMIN — CARBIDOPA AND LEVODOPA 1 TABLET: 25; 100 TABLET ORAL at 06:00

## 2020-06-27 RX ADMIN — TAMSULOSIN HYDROCHLORIDE 0.4 MG: 0.4 CAPSULE ORAL at 05:02

## 2020-06-27 RX ADMIN — ONDANSETRON 8 MG: 2 INJECTION INTRAMUSCULAR; INTRAVENOUS at 06:19

## 2020-06-27 RX ADMIN — CARBIDOPA AND LEVODOPA 1 TABLET: 25; 100 TABLET ORAL at 13:20

## 2020-06-27 RX ADMIN — FINASTERIDE 5 MG: 5 TABLET, FILM COATED ORAL at 05:01

## 2020-06-27 RX ADMIN — GABAPENTIN 300 MG: 300 CAPSULE ORAL at 05:01

## 2020-06-27 RX ADMIN — MUPIROCIN 1 APPLICATION: 20 OINTMENT TOPICAL at 17:28

## 2020-06-27 RX ADMIN — VALACYCLOVIR HYDROCHLORIDE 1000 MG: 500 TABLET, FILM COATED ORAL at 05:02

## 2020-06-27 RX ADMIN — ENOXAPARIN SODIUM 40 MG: 40 INJECTION SUBCUTANEOUS at 17:24

## 2020-06-27 RX ADMIN — ASPIRIN 81 MG: 81 TABLET, COATED ORAL at 05:01

## 2020-06-27 RX ADMIN — ACETAMINOPHEN 1000 MG: 500 TABLET ORAL at 05:01

## 2020-06-27 RX ADMIN — GABAPENTIN 300 MG: 300 CAPSULE ORAL at 17:24

## 2020-06-27 RX ADMIN — FUROSEMIDE 20 MG: 10 INJECTION, SOLUTION INTRAMUSCULAR; INTRAVENOUS at 05:03

## 2020-06-27 RX ADMIN — PAROXETINE HYDROCHLORIDE 40 MG: 20 TABLET, FILM COATED ORAL at 05:02

## 2020-06-27 RX ADMIN — METOPROLOL TARTRATE 25 MG: 25 TABLET, FILM COATED ORAL at 17:25

## 2020-06-27 RX ADMIN — CLOPIDOGREL BISULFATE 75 MG: 75 TABLET ORAL at 05:01

## 2020-06-27 RX ADMIN — SULFAMETHOXAZOLE AND TRIMETHOPRIM 1 TABLET: 800; 160 TABLET ORAL at 05:02

## 2020-06-27 RX ADMIN — PANCRELIPASE 6000 UNITS: 30000; 6000; 19000 CAPSULE, DELAYED RELEASE PELLETS ORAL at 13:19

## 2020-06-27 RX ADMIN — PANCRELIPASE 6000 UNITS: 30000; 6000; 19000 CAPSULE, DELAYED RELEASE PELLETS ORAL at 17:24

## 2020-06-27 RX ADMIN — CARBIDOPA AND LEVODOPA 1 TABLET: 25; 100 TABLET ORAL at 17:23

## 2020-06-27 ASSESSMENT — ENCOUNTER SYMPTOMS
EYE PAIN: 0
CHILLS: 0
DIZZINESS: 0
SHORTNESS OF BREATH: 0
FOCAL WEAKNESS: 0
MEMORY LOSS: 0
POLYDIPSIA: 0
BLOOD IN STOOL: 0
SPEECH CHANGE: 0
HALLUCINATIONS: 0
COUGH: 0
HEMOPTYSIS: 0
HEADACHES: 0
PHOTOPHOBIA: 0
ABDOMINAL PAIN: 0
BRUISES/BLEEDS EASILY: 0
FEVER: 0
TREMORS: 0
PND: 0
NAUSEA: 1
PALPITATIONS: 0
ORTHOPNEA: 0
DOUBLE VISION: 0
VOMITING: 0
SEIZURES: 0

## 2020-06-27 ASSESSMENT — COGNITIVE AND FUNCTIONAL STATUS - GENERAL
MOBILITY SCORE: 24
SUGGESTED CMS G CODE MODIFIER MOBILITY: CH

## 2020-06-27 ASSESSMENT — FIBROSIS 4 INDEX: FIB4 SCORE: 3.04

## 2020-06-27 ASSESSMENT — COPD QUESTIONNAIRES
DO YOU EVER COUGH UP ANY MUCUS OR PHLEGM?: NO/ONLY WITH OCCASIONAL COLDS OR INFECTIONS
COPD SCREENING SCORE: 5
HAVE YOU SMOKED AT LEAST 100 CIGARETTES IN YOUR ENTIRE LIFE: YES
DURING THE PAST 4 WEEKS HOW MUCH DID YOU FEEL SHORT OF BREATH: SOME OF THE TIME

## 2020-06-27 ASSESSMENT — GAIT ASSESSMENTS
DEVIATION: NO DEVIATION
GAIT LEVEL OF ASSIST: SUPERVISED
DISTANCE (FEET): 350

## 2020-06-27 ASSESSMENT — LIFESTYLE VARIABLES: EVER_SMOKED: YES

## 2020-06-27 NOTE — THERAPY
"Physical Therapy   Initial Evaluation     Patient Name: Ricky Spangler  Age:  70 y.o., Sex:  male  Medical Record #: [id number]  Today's Date: 6/27/2020     Precautions: (P) Cardiac Precautions (See Comments), Sternal Precautions (See Comments)    Assessment  Patient is 70 y.o. male POD#2 s/p CABG. Patient was indep PTA but had also received recent diagnosis of PD. Patient is recovering well from surgery and was able to demo all mobility at a supervision level. Provided extensive education re: sternal precautions and cardiac rehab with safe return to exercise. Patient is in agreement with plan and can be ambulating with the RN staff as tolerated. No other further skilled IP PT needs at this time. Safe for DC home when medically stable.     Plan    Discharge recommendations:  Recommend outpatient physical therapy services for cardiac rehab     Subjective  \"I'm feeling pretty good\"     Objective       06/27/20 1401   Precautions   Precautions Cardiac Precautions (See Comments);Sternal Precautions (See Comments)   Comments Handout provided    Pain 0 - 10 Group   Pain Rating Scale (NPRS) 0   Prior Living Situation   Prior Services Home-Independent   Housing / Facility [address]   Steps Into Home 4  (ramp too )   Bathroom Set up Walk In Shower;Shower Chair   Equipment Owned Front-Wheel Walker;Single Point Cane   Lives with - Patient's Self Care Capacity Adult Children;Spouse   Comments wife and son can provide assist as needed    Prior Level of Functional Mobility   Bed Mobility Independent   Transfer Status Independent   Ambulation Independent   Distance Ambulation (Feet)   (community distances )   Assistive Devices Used None   History of Falls   History of Falls No   Cognition    Cognition / Consciousness WDL   Level of Consciousness Alert   Strength Lower Body   Lower Body Strength  WDL   Sensation Lower Body   Lower Extremity Sensation   WDL   Strength Upper Body   Upper Body Strength  WDL   Neurological Concerns "   Neurological Concerns Yes   Comments new diagnosis of PD, no tremors notes, some delayed initiation of gait observed    Balance Assessment   Sitting Balance (Static) Good   Sitting Balance (Dynamic) Good   Standing Balance (Static) Fair +   Standing Balance (Dynamic) Fair   Weight Shift Sitting Good   Weight Shift Standing Good   Gait Analysis   Gait Level Of Assist Supervised   Assistive Device None   Distance (Feet) 350   # of Times Distance was Traveled 1   Deviation No deviation   Skilled Intervention Verbal Cuing   Comments cueing for self-pacing    Bed Mobility    Supine to Sit Supervised   Sit to Supine Supervised   Scooting Supervised   Rolling Supervised   Functional Mobility   Sit to Stand Supervised   Edema / Skin Assessment   Comments strenal incision C/D/I    Education Group   Education Provided Cardiac Precautions;Sternal Precautions   Cardiac Precautions Patient Response Patient;Acceptance;Explanation;Demonstration;Handout;Verbal Demonstration;Action Demonstration   Sternal Precautions Patient Response Patient;Eager;Explanation;Demonstration;Handout;Verbal Demonstration;Action Demonstration   Anticipated Discharge Equipment   DC Equipment None       Gabby Lopez, PT, DPT, GCS

## 2020-06-27 NOTE — CARE PLAN
Problem: Post op day 2 CABG/Heart Valve Replacement  Goal: Optimal care of the post op CABG/heart valve replacement post op day 2  Outcome: PROGRESSING AS EXPECTED  Intervention: Daily Weights  Note: Completed on NOC  Intervention: Up in chair for all meals  Note: Up to chair  Intervention: Ambulate, increasing the distance each time x 3 and before bed  Note: Ambulation increased  Intervention: IS q 1 hour while awake and record best IS volume  Note: IS completed while awake

## 2020-06-27 NOTE — CARE PLAN
Problem: Post op day 2 CABG/Heart Valve Replacement  Goal: Optimal care of the post op CABG/heart valve replacement post op day 2  Outcome: PROGRESSING AS EXPECTED  Intervention: FSBS: when 2 consecutive BS < 130 after post op day 2, discontinue FSBS unless patient is insulin dependent diabetic  Note: Diabetic  Intervention: Daily Weights  Note: Met  Intervention: Up in chair for all meals  Note: compliant  Intervention: Ambulate, increasing the distance each time x 3 and before bed  Note: Met  Intervention: Stand at sink and wash up with assistance.  Clean incisions twice daily with soap and water.  Note: Met  Intervention: IS q 1 hour while awake and record best IS volume  Note: Met  Intervention: Consider pacer wire removal by MD  Note: Discussed with Fabiola Bernal.  Anticipated removal 6/28  Intervention: Consider removal of victor and chest tube if not already done  Note: Met

## 2020-06-27 NOTE — PROGRESS NOTES
Cardiovascular Surgery Progress Note    Name: Ricky Spangler  MRN: [id number]  : 1950  Admit Date: 2020  6:15 AM  Procedure:  Procedure(s) and Anesthesia Type:     * CABG, WITH ENDOSCOPIC VEIN PROCUREMENT- X3 - General     * ECHOCARDIOGRAM, TRANSESOPHAGEAL - General  2 Day Post-Op    Vitals:  Patient Vitals for the past 8 hrs:   Temp SpO2 O2 Delivery Device O2 (LPM) Pulse Resp BP Weight   20 0957 -- -- Silicone Nasal Cannula 0.5 -- -- -- --   20 0920 -- 94 % Silicone Nasal Cannula 1 66 16 -- --   20 0800 36.3 °C (97.3 °F) -- Silicone Nasal Cannula 1 -- -- -- --   20 0721 -- 96 % Silicone Nasal Cannula 1.5 70 19 -- --   20 0600 -- -- -- -- -- -- -- 78.8 kg (173 lb 11.6 oz)   20 0502 -- -- -- -- 72 -- 121/59 --   20 0400 36.7 °C (98.1 °F) 93 % Silicone Nasal Cannula 1 73 13 110/57 --     Temp (24hrs), Av.8 °C (98.2 °F), Min:36.3 °C (97.3 °F), Max:37.4 °C (99.3 °F)      Respiratory:    Respiration: 16, Pulse Oximetry: 94 %     Chest Tube Drains:          Fluids:    Intake/Output Summary (Last 24 hours) at 2020 1020  Last data filed at 2020 0800  Gross per 24 hour   Intake 12.34 ml   Output 2410 ml   Net -2397.66 ml     Admit weight: Weight: 82 kg (180 lb 12.4 oz)  Current weight: Weight: 78.8 kg (173 lb 11.6 oz) (20 0600)    Labs:  Recent Labs     20  0335 20  0515 20  0223   WBC 8.2 16.9* 14.1*   RBC 4.55* 3.72* 3.64*   HEMOGLOBIN 14.2 11.7* 11.3*   HEMATOCRIT 43.5 35.6* 34.6*   MCV 95.6 95.7 95.1   MCH 31.2 31.5 31.0   MCHC 32.6* 32.9* 32.7*   RDW 48.8 48.9 47.8   PLATELETCT 201 138* 131*   MPV 9.9 10.0 10.1     Recent Labs     20  033   NEUTSPOLYS 54.50   LYMPHOCYTES 28.90   MONOCYTES 9.40   EOSINOPHILS 5.90   BASOPHILS 0.90     Recent Labs     20  0335  20  2300 20  0515 20  0223   SODIUM 137  --   --  136 135   POTASSIUM 4.6   < > 5.4 4.8 4.8   CHLORIDE 103  --   --  103 98   CO2 24  --   --   22 28   GLUCOSE 110*  --   --  109* 115*   BUN 14  --   --  14 16   CREATININE 1.21  --   --  1.02 1.09   CALCIUM 8.9  --   --  8.5 8.7    < > = values in this interval not displayed.     Recent Labs     06/25/20  0335   APTT 54.5*   INR 0.98       Medications:  • pantoprazole  40 mg     • insulin lispro  2 Units     • insulin NPH  3 Units     • insulin lispro  0-10 Units     • furosemide  20 mg     • potassium chloride SA  20 mEq     • Pharmacy Consult Request  1 Each     • acetaminophen  1,000 mg     • gabapentin  300 mg      Followed by   • [START ON 7/1/2020] gabapentin  100 mg     • senna-docusate  2 Tab      And   • polyethylene glycol/lytes  1 Packet      And   • magnesium hydroxide  30 mL     • enoxaparin  40 mg     • mupirocin  1 Application     • metoprolol  25 mg     • aspirin EC  81 mg     • clopidogrel  75 mg     • atorvastatin  80 mg     • carbidopa-levodopa  1 Tab     • finasteride  5 mg     • pancrelipase (Lip-Prot-Amyl)  1 Cap     • PARoxetine  40 mg     • tamsulosin  0.4 mg     • valACYclovir  1,000 mg          Ordered Medications:    ASA - Yes    Plavix - Yes    Post-operative Beta Blockers - Yes    Ace Inhibitor - No; contraindicated because of Normal EF    Statin - Yes    Exam:   Review of Systems   Constitutional: Positive for malaise/fatigue. Negative for chills and fever.   HENT: Negative for ear pain, nosebleeds and tinnitus.    Eyes: Negative for double vision, photophobia and pain.   Respiratory: Negative for cough, hemoptysis and shortness of breath.    Cardiovascular: Negative for chest pain, palpitations, orthopnea, leg swelling and PND.   Gastrointestinal: Positive for nausea. Negative for abdominal pain, blood in stool and vomiting.   Genitourinary: Negative for frequency, hematuria and urgency.   Musculoskeletal: Positive for joint pain.   Skin: Negative for rash.   Neurological: Negative for dizziness, tremors, speech change, focal weakness, seizures and headaches.    Endo/Heme/Allergies: Negative for polydipsia. Does not bruise/bleed easily.   Psychiatric/Behavioral: Negative for hallucinations and memory loss.       Physical Exam  Vitals signs and nursing note reviewed.   Constitutional:       Appearance: Normal appearance.   HENT:      Head: Normocephalic and atraumatic.      Nose: Nose normal.      Mouth/Throat:      Mouth: Mucous membranes are moist.      Pharynx: Oropharynx is clear.   Eyes:      Extraocular Movements: Extraocular movements intact.      Conjunctiva/sclera: Conjunctivae normal.      Pupils: Pupils are equal, round, and reactive to light.   Neck:      Musculoskeletal: Normal range of motion and neck supple.   Cardiovascular:      Rate and Rhythm: Normal rate and regular rhythm.      Pulses: Normal pulses.   Pulmonary:      Effort: Pulmonary effort is normal.      Comments: Decreased in the bases bilaterally  Chest:      Chest wall: Tenderness present.   Abdominal:      General: Abdomen is flat. Bowel sounds are normal.      Palpations: Abdomen is soft.   Musculoskeletal: Normal range of motion.      Right lower leg: Edema present.      Left lower leg: Edema present.   Skin:     General: Skin is warm and dry.      Coloration: Skin is not jaundiced.      Findings: No rash.   Neurological:      General: No focal deficit present.      Mental Status: He is alert and oriented to person, place, and time. Mental status is at baseline.   Psychiatric:         Mood and Affect: Mood normal.         Quality Measures:   Quality-Core Measures   Reviewed items::  EKG reviewed, Radiology images reviewed, Labs reviewed and Medications reviewed  Anderson catheter::  No Anderson  DVT prophylaxis pharmacological::  Enoxaparin (Lovenox)  DVT prophylaxis - mechanical:  Not indicated at this time, ambulatory  Ulcer Prophylaxis::  No      Assessment/Plan:  POD #1:  HDS.  Off all gtts.  Moderate chest tube output overnight.  Pain controlled.  Patient nauseated. Improved after BM.  Wounds  CDI.  Weight registered as down??  Fluid positive.   Plan:  Keep mediastinal tubes.  Keep victor for strict I/O.  Begin gentle diuresis.  Up and out of bed as tolerated.  POD #2:  HDS.  NSR with RBBB.  Minimal chest tube output overnight.  Heartburn/nausea.  States he does better with Protonix vs prilosec.  Pain controlled.  Diuresing well.  WBC trending downward.  Plan:  D/c mediastinal tubes.  D/c victor.  Switch to protonix.  Continue diuresis.  Up and out of bed as tolerated.  Encourage IS.     Active Hospital Problems    Diagnosis   • NSTEMI (non-ST elevated myocardial infarction) ([facility]) [I21.4]     Priority: High   • Parkinson's disease ([facility]) [G20]     Priority: Medium   • Hypertension [I10]     Priority: Medium   • Type 2 diabetes mellitus ([facility]) [E11.9]     Priority: Low   • Pancreatitis, chronic ([facility]) [K86.1]     Priority: Low   • Prostatitis [N41.9]     Priority: Low   • BPH (benign prostatic hyperplasia) [N40.0]     Priority: Low   • COPD (chronic obstructive pulmonary disease) ([facility]) [J44.9]     Priority: Low

## 2020-06-28 ENCOUNTER — APPOINTMENT (OUTPATIENT)
Dept: RADIOLOGY | Facility: MEDICAL CENTER | Age: 70
DRG: 233 | End: 2020-06-28
Attending: STUDENT IN AN ORGANIZED HEALTH CARE EDUCATION/TRAINING PROGRAM
Payer: MEDICARE

## 2020-06-28 LAB
ANION GAP SERPL CALC-SCNC: 6 MMOL/L (ref 7–16)
APPEARANCE UR: CLEAR
BACTERIA #/AREA URNS HPF: NEGATIVE /HPF
BILIRUB UR QL STRIP.AUTO: NEGATIVE
BUN SERPL-MCNC: 14 MG/DL (ref 8–22)
CALCIUM SERPL-MCNC: 9 MG/DL (ref 8.5–10.5)
CHLORIDE SERPL-SCNC: 99 MMOL/L (ref 96–112)
CO2 SERPL-SCNC: 29 MMOL/L (ref 20–33)
COLOR UR: YELLOW
CREAT SERPL-MCNC: 1.07 MG/DL (ref 0.5–1.4)
EPI CELLS #/AREA URNS HPF: NEGATIVE /HPF
ERYTHROCYTE [DISTWIDTH] IN BLOOD BY AUTOMATED COUNT: 48.4 FL (ref 35.9–50)
ERYTHROCYTE [DISTWIDTH] IN BLOOD BY AUTOMATED COUNT: 49.7 FL (ref 35.9–50)
GLUCOSE SERPL-MCNC: 134 MG/DL (ref 65–99)
GLUCOSE UR STRIP.AUTO-MCNC: NEGATIVE MG/DL
HCT VFR BLD AUTO: 35 % (ref 42–52)
HCT VFR BLD AUTO: 37.1 % (ref 42–52)
HGB BLD-MCNC: 11.3 G/DL (ref 14–18)
HGB BLD-MCNC: 11.9 G/DL (ref 14–18)
KETONES UR STRIP.AUTO-MCNC: NEGATIVE MG/DL
LEUKOCYTE ESTERASE UR QL STRIP.AUTO: NEGATIVE
MCH RBC QN AUTO: 31 PG (ref 27–33)
MCH RBC QN AUTO: 31.2 PG (ref 27–33)
MCHC RBC AUTO-ENTMCNC: 32.1 G/DL (ref 33.7–35.3)
MCHC RBC AUTO-ENTMCNC: 32.3 G/DL (ref 33.7–35.3)
MCV RBC AUTO: 95.9 FL (ref 81.4–97.8)
MCV RBC AUTO: 97.4 FL (ref 81.4–97.8)
MICRO URNS: ABNORMAL
NITRITE UR QL STRIP.AUTO: NEGATIVE
PH UR STRIP.AUTO: 8 [PH] (ref 5–8)
PLATELET # BLD AUTO: 151 K/UL (ref 164–446)
PLATELET # BLD AUTO: 217 K/UL (ref 164–446)
PMV BLD AUTO: 10.2 FL (ref 9–12.9)
PMV BLD AUTO: 9.9 FL (ref 9–12.9)
POTASSIUM SERPL-SCNC: 5.3 MMOL/L (ref 3.6–5.5)
PROT UR QL STRIP: NEGATIVE MG/DL
RBC # BLD AUTO: 3.65 M/UL (ref 4.7–6.1)
RBC # BLD AUTO: 3.81 M/UL (ref 4.7–6.1)
RBC # URNS HPF: ABNORMAL /HPF
RBC UR QL AUTO: ABNORMAL
SODIUM SERPL-SCNC: 134 MMOL/L (ref 135–145)
SP GR UR STRIP.AUTO: 1
UROBILINOGEN UR STRIP.AUTO-MCNC: 0.2 MG/DL
WBC # BLD AUTO: 12.2 K/UL (ref 4.8–10.8)
WBC # BLD AUTO: 14.4 K/UL (ref 4.8–10.8)
WBC #/AREA URNS HPF: ABNORMAL /HPF

## 2020-06-28 PROCEDURE — 80048 BASIC METABOLIC PNL TOTAL CA: CPT

## 2020-06-28 PROCEDURE — 700111 HCHG RX REV CODE 636 W/ 250 OVERRIDE (IP): Performed by: PHYSICIAN ASSISTANT

## 2020-06-28 PROCEDURE — 94669 MECHANICAL CHEST WALL OSCILL: CPT

## 2020-06-28 PROCEDURE — A9270 NON-COVERED ITEM OR SERVICE: HCPCS | Performed by: STUDENT IN AN ORGANIZED HEALTH CARE EDUCATION/TRAINING PROGRAM

## 2020-06-28 PROCEDURE — 85027 COMPLETE CBC AUTOMATED: CPT

## 2020-06-28 PROCEDURE — 81001 URINALYSIS AUTO W/SCOPE: CPT

## 2020-06-28 PROCEDURE — 700102 HCHG RX REV CODE 250 W/ 637 OVERRIDE(OP): Performed by: PHYSICIAN ASSISTANT

## 2020-06-28 PROCEDURE — 700102 HCHG RX REV CODE 250 W/ 637 OVERRIDE(OP): Performed by: STUDENT IN AN ORGANIZED HEALTH CARE EDUCATION/TRAINING PROGRAM

## 2020-06-28 PROCEDURE — 99024 POSTOP FOLLOW-UP VISIT: CPT | Performed by: THORACIC SURGERY (CARDIOTHORACIC VASCULAR SURGERY)

## 2020-06-28 PROCEDURE — 770020 HCHG ROOM/CARE - TELE (206)

## 2020-06-28 PROCEDURE — 94760 N-INVAS EAR/PLS OXIMETRY 1: CPT

## 2020-06-28 PROCEDURE — C9113 INJ PANTOPRAZOLE SODIUM, VIA: HCPCS | Performed by: PHYSICIAN ASSISTANT

## 2020-06-28 PROCEDURE — A9270 NON-COVERED ITEM OR SERVICE: HCPCS | Performed by: PHYSICIAN ASSISTANT

## 2020-06-28 PROCEDURE — 36415 COLL VENOUS BLD VENIPUNCTURE: CPT

## 2020-06-28 PROCEDURE — 99232 SBSQ HOSP IP/OBS MODERATE 35: CPT | Mod: GC | Performed by: INTERNAL MEDICINE

## 2020-06-28 PROCEDURE — 71046 X-RAY EXAM CHEST 2 VIEWS: CPT

## 2020-06-28 RX ORDER — PANTOPRAZOLE SODIUM 40 MG/10ML
40 INJECTION, POWDER, LYOPHILIZED, FOR SOLUTION INTRAVENOUS 2 TIMES DAILY
Status: DISCONTINUED | OUTPATIENT
Start: 2020-06-28 | End: 2020-06-30

## 2020-06-28 RX ORDER — FUROSEMIDE 20 MG/1
20 TABLET ORAL
Status: DISCONTINUED | OUTPATIENT
Start: 2020-06-29 | End: 2020-07-01 | Stop reason: HOSPADM

## 2020-06-28 RX ORDER — SUCRALFATE 1 G/1
1 TABLET ORAL EVERY 6 HOURS
Status: DISCONTINUED | OUTPATIENT
Start: 2020-06-29 | End: 2020-07-01 | Stop reason: HOSPADM

## 2020-06-28 RX ORDER — FUROSEMIDE 20 MG/1
20-40 TABLET ORAL
Status: DISCONTINUED | OUTPATIENT
Start: 2020-06-28 | End: 2020-06-28

## 2020-06-28 RX ORDER — SIMETHICONE 80 MG
80 TABLET,CHEWABLE ORAL 3 TIMES DAILY PRN
Status: DISCONTINUED | OUTPATIENT
Start: 2020-06-28 | End: 2020-07-01 | Stop reason: HOSPADM

## 2020-06-28 RX ORDER — ALPRAZOLAM 0.5 MG/1
.5-1 TABLET ORAL NIGHTLY PRN
Status: DISCONTINUED | OUTPATIENT
Start: 2020-06-28 | End: 2020-07-01 | Stop reason: HOSPADM

## 2020-06-28 RX ADMIN — CLOPIDOGREL BISULFATE 75 MG: 75 TABLET ORAL at 09:20

## 2020-06-28 RX ADMIN — CARBIDOPA AND LEVODOPA 1 TABLET: 25; 100 TABLET ORAL at 09:20

## 2020-06-28 RX ADMIN — METFORMIN HYDROCHLORIDE 500 MG: 500 TABLET ORAL at 17:35

## 2020-06-28 RX ADMIN — CARBIDOPA AND LEVODOPA 1 TABLET: 25; 100 TABLET ORAL at 17:35

## 2020-06-28 RX ADMIN — ACETAMINOPHEN 1000 MG: 500 TABLET ORAL at 09:22

## 2020-06-28 RX ADMIN — METOPROLOL TARTRATE 25 MG: 25 TABLET, FILM COATED ORAL at 17:35

## 2020-06-28 RX ADMIN — MUPIROCIN 1 APPLICATION: 20 OINTMENT TOPICAL at 06:32

## 2020-06-28 RX ADMIN — ATORVASTATIN CALCIUM 80 MG: 80 TABLET, FILM COATED ORAL at 17:35

## 2020-06-28 RX ADMIN — PANTOPRAZOLE SODIUM 40 MG: 40 INJECTION, POWDER, LYOPHILIZED, FOR SOLUTION INTRAVENOUS at 15:12

## 2020-06-28 RX ADMIN — ASPIRIN 81 MG: 81 TABLET, COATED ORAL at 09:19

## 2020-06-28 RX ADMIN — VALACYCLOVIR HYDROCHLORIDE 1000 MG: 500 TABLET, FILM COATED ORAL at 09:19

## 2020-06-28 RX ADMIN — FINASTERIDE 5 MG: 5 TABLET, FILM COATED ORAL at 09:20

## 2020-06-28 RX ADMIN — FUROSEMIDE 20 MG: 10 INJECTION, SOLUTION INTRAMUSCULAR; INTRAVENOUS at 06:28

## 2020-06-28 RX ADMIN — PANTOPRAZOLE SODIUM 40 MG: 40 INJECTION, POWDER, LYOPHILIZED, FOR SOLUTION INTRAVENOUS at 06:19

## 2020-06-28 RX ADMIN — TAMSULOSIN HYDROCHLORIDE 0.4 MG: 0.4 CAPSULE ORAL at 09:19

## 2020-06-28 RX ADMIN — METOPROLOL TARTRATE 25 MG: 25 TABLET, FILM COATED ORAL at 09:20

## 2020-06-28 RX ADMIN — ALPRAZOLAM 1 MG: 0.5 TABLET ORAL at 21:08

## 2020-06-28 RX ADMIN — GABAPENTIN 300 MG: 300 CAPSULE ORAL at 09:21

## 2020-06-28 RX ADMIN — CARBIDOPA AND LEVODOPA 1 TABLET: 25; 100 TABLET ORAL at 13:04

## 2020-06-28 RX ADMIN — GABAPENTIN 300 MG: 300 CAPSULE ORAL at 17:35

## 2020-06-28 RX ADMIN — MUPIROCIN 1 APPLICATION: 20 OINTMENT TOPICAL at 17:36

## 2020-06-28 RX ADMIN — ONDANSETRON 8 MG: 2 INJECTION INTRAMUSCULAR; INTRAVENOUS at 21:10

## 2020-06-28 RX ADMIN — PAROXETINE HYDROCHLORIDE 40 MG: 20 TABLET, FILM COATED ORAL at 06:25

## 2020-06-28 RX ADMIN — PANCRELIPASE 6000 UNITS: 30000; 6000; 19000 CAPSULE, DELAYED RELEASE PELLETS ORAL at 09:20

## 2020-06-28 ASSESSMENT — ENCOUNTER SYMPTOMS
DEPRESSION: 0
DIZZINESS: 0
EYE PAIN: 0
CHILLS: 0
NAUSEA: 1
ORTHOPNEA: 0
NAUSEA: 0
WHEEZING: 0
BRUISES/BLEEDS EASILY: 0
FOCAL WEAKNESS: 0
SPEECH CHANGE: 0
POLYDIPSIA: 0
FEVER: 0
COUGH: 0
HALLUCINATIONS: 0
VOMITING: 0
PALPITATIONS: 0
MEMORY LOSS: 0
BLOOD IN STOOL: 0
TREMORS: 0
DOUBLE VISION: 0
HEMOPTYSIS: 0
PHOTOPHOBIA: 0
SHORTNESS OF BREATH: 0
SEIZURES: 0
BACK PAIN: 0
PND: 0
HEADACHES: 0
DIARRHEA: 0
ABDOMINAL PAIN: 0

## 2020-06-28 ASSESSMENT — COGNITIVE AND FUNCTIONAL STATUS - GENERAL
SUGGESTED CMS G CODE MODIFIER MOBILITY: CH
MOBILITY SCORE: 24
SUGGESTED CMS G CODE MODIFIER DAILY ACTIVITY: CH
DAILY ACTIVITIY SCORE: 24

## 2020-06-28 ASSESSMENT — FIBROSIS 4 INDEX: FIB4 SCORE: 2.64

## 2020-06-28 NOTE — PROGRESS NOTES
2 RN Skin Check    2 RN skin check complete w/ Elyssa RN.   Devices in place: nasal cannula, tele box, peripheral IV  Skin assessed under devices: yes.  Confirmed pressure ulcers found on: N/A.  New potential pressure ulcers noted on N/AA. Wound consult placed No.  The following interventions in place Pillows, Lotion and silicone oxygen tubing, grey foams, patient can turn from side to side.    Ears: intact  Elbows: intact  Sacrum: pink, blanching  Chest: midline incision, clean, dry, chest tube dressing has old drainage, pacer wires capped  Legs: R vein graft, dressing CDI  Feet/heels: intact, dry, calloused

## 2020-06-28 NOTE — DISCHARGE PLANNING
"RN CM met with patient at bedside to discuss home oxygen as ordered. [facility] [place]. Patient's plan is to be picked up by family tomorrow if discharged.   RN CM called and spoke with on call person at [facility] ([phone]) fax ([phone]) who said that if a referral was sent tomorrow morning and is written properly-with a long-term cardiopulmonary diagnosis for \"99 months\", Medicare should approve and patient's family can  enough oxygen equipment and bring to [place] to pick patient up so he has oxygen to get home on.   They cannot process anything today so she suggested not sending referral until the morning. RN CM read current referral to representative who states that it would not be covered as written due to \"30 days\" being the length of need written, and unsure if diagnosis listed would be covered, but there is no way to know today.  RED MARIN, Elyssa, misti. Will repeat home oxygen eval to see if patient still requiring it either tonight or tomorrow morning. Referral can then be sent if still needed, may need to be rewritten if able to per Medicare guidelines.    Patient's physical address: [facility], [place] [address]  "

## 2020-06-28 NOTE — PROGRESS NOTES
Assumed care of patient, bedside report received from TANIA Morse. Updated on POC, call light within reach and fall precautions in place. Bed locked and in lowest position. Patient instructed to call for assistance before getting out of bed. All questions answered, no other needs at this time.

## 2020-06-28 NOTE — PROGRESS NOTES
Daily Progress Note:     Date of Service: 6/28/2020  Primary Team: [facility] IM Blue Team   Attending: Lexa Arnold M.D.   Senior Resident: Dr. Campbell  Intern: Dr. Suarez  Contact:  [phone]    Chief Complaint:   Chest pain    ID: 69-year-old male transferred from outside hospital for further management of NSTEMI.  Receiving heparin drip.  Cardiac cath remarkable for multivessel disease and reduced EF 45%.  Patient underwent CABG on 6/25/2020    Subjective:   -Patient transferred to medical floors.  No acute events overnight.  Remains hemodynamically stable. POD#3  -Patient reports feeling better.  Has minimal chest pain at surgical site.  No shortness of breath/palpitations.  -No change in bowel/bladder habits  -Oral intake adequate    Dispo: Pending cardiothoracic clearance    Consultants/Specialty:  Cardiology  Cardio Thoracic surgery    Review of Systems:    Review of Systems   Constitutional: Negative for chills and fever.   HENT: Negative for ear pain.    Eyes: Negative for double vision.   Respiratory: Negative for cough, shortness of breath and wheezing.    Cardiovascular: Positive for chest pain. Negative for palpitations, orthopnea and leg swelling.        Minimal surgical site pain   Gastrointestinal: Negative for abdominal pain, diarrhea, nausea and vomiting.   Genitourinary: Negative for dysuria, frequency and hematuria.   Musculoskeletal: Negative for back pain.   Neurological: Negative for dizziness, focal weakness and headaches.   Psychiatric/Behavioral: Negative for depression.       Objective Data:   Physical Exam:   Vitals:   Temp:  [36.3 °C (97.4 °F)-36.7 °C (98 °F)] 36.7 °C (98 °F)  Pulse:  [64-93] 88  Resp:  [11-21] 18  BP: (105-145)/(53-78) 145/65  SpO2:  [91 %-95 %] 94 %    Physical Exam  Constitutional:       General: He is not in acute distress.  HENT:      Head: Normocephalic and atraumatic.      Nose: No rhinorrhea.      Mouth/Throat:      Mouth: Mucous membranes are moist.   Eyes:       Extraocular Movements: Extraocular movements intact.      Pupils: Pupils are equal, round, and reactive to light.   Neck:      Musculoskeletal: Normal range of motion.   Cardiovascular:      Rate and Rhythm: Normal rate.      Pulses: Normal pulses.      Heart sounds: No murmur.      Comments: Surgical site dressings intact, no signs of leakage  Pulmonary:      Effort: Pulmonary effort is normal. No respiratory distress.      Breath sounds: No wheezing.      Comments: Minimal bibasilar rales  Abdominal:      General: Abdomen is flat. There is no distension.      Palpations: Abdomen is soft.      Tenderness: There is no abdominal tenderness.   Musculoskeletal: Normal range of motion.         General: No swelling.   Skin:     General: Skin is warm and dry.   Neurological:      General: No focal deficit present.      Mental Status: He is alert and oriented to person, place, and time. Mental status is at baseline.   Psychiatric:         Mood and Affect: Mood normal.         Behavior: Behavior normal.         Quality Measures  Quality-Core Measures   Reviewed items::  EKG reviewed, Labs reviewed and Medications reviewed  Anderson catheter::  No Anderson  DVT prophylaxis pharmacological::  Heparin  Ulcer Prophylaxis::  Yes      Labs:   Recent Labs     06/26/20 0515 06/27/20 0223 06/28/20  0130   WBC 16.9* 14.1* 12.2*   RBC 3.72* 3.64* 3.65*   HEMOGLOBIN 11.7* 11.3* 11.3*   HEMATOCRIT 35.6* 34.6* 35.0*   MCV 95.7 95.1 95.9   MCH 31.5 31.0 31.0   RDW 48.9 47.8 48.4   PLATELETCT 138* 131* 151*   MPV 10.0 10.1 10.2      Recent Labs     06/26/20 0515 06/27/20 0223 06/28/20  0130   SODIUM 136 135 134*   POTASSIUM 4.8 4.8 5.3   CHLORIDE 103 98 99   CO2 22 28 29   GLUCOSE 109* 115* 134*   BUN 14 16 14      Recent Labs     06/26/20 0515 06/27/20 0223 06/28/20  0130   CREATININE 1.02 1.09 1.07      Lab Results   Component Value Date/Time    PROTHROMBTM 13.3 06/25/2020 03:35 AM    INR 0.98 06/25/2020 03:35 AM         Imaging:    DX-CHEST-2 VIEWS   Final Result      Mild right basilar atelectasis. No focal consolidation or pleural effusions.      DX-CHEST-PORTABLE (1 VIEW)   Final Result      Minimal stable left basilar atelectasis. Unremarkable chest appearance otherwise.      EC-BRODY W/O CONT         DX-CHEST-PORTABLE (1 VIEW)   Final Result      1.  Perihilar interstitial opacities likely represent atelectasis. There may be a component of interstitial edema.      2.  Endotracheal tube tip projects approximately 1.8 cm above the gavino.      3.  Right internal jugular catheter tip projects over the cavoatrial junction. No pneumothorax is identified.      CT-CHEST (THORAX) W/O   Final Result         1. Opacity seen on the recent radiograph represents bony proliferation at the right first costosternal junction. No suspicious pulmonary nodules or masses.   2. Atherosclerosis and coronary calcifications.   3. Colonic diverticulosis.      US-CAROTID DOPPLER BILAT   Final Result      US-VEIN MAPPING LOWER EXTREMITY BILAT   Final Result      DX-CHEST-PORTABLE (1 VIEW)   Final Result      Focal opacity projecting over the first costochondral junction on the right. This may be related to asymmetric prominence of the costochondral junction but an underlying mass is not excluded. Further evaluation can be performed with CT chest.         EC-ECHOCARDIOGRAM COMPLETE W/ CONT   Final Result      OUTSIDE IMAGES-CT ABDOMEN /PELVIS   Final Result      OUTSIDE IMAGES-DX CHEST   Final Result      CL-LEFT HEART CATHETERIZATION WITH POSSIBLE INTERVENTION    (Results Pending)        NSTEMI (non-ST elevated myocardial infarction) (HCC)  Assessment & Plan  Patient admitted with an STEMI.  Cardiac cath [6/22/2020] remarkable for multivessel disease, EF 45%.  CABG recommended. S/p CABG on 6/25/2020.  HbA1c 6.5, lipid panel normal.      -Continue ASA, plavix, statin  -Continue low-dose metoprolol.   -Lasix PRN  -Dispo: Cardiac rehab    Type 2 diabetes mellitus  ([facility])  Assessment & Plan  -Known history of type 2 diabetes mellitus.  On metformin at home.  HbA1c 6.5   -Maintain euglycemia, blood glucose 140 to 180 mg/dL    Hypertension  Assessment & Plan  -Known history of hypertension.  On losartan at home. low SBP on admit  -Resume medications when appropriate    Parkinson's disease ([facility])  Assessment & Plan  -Known history of Parkinson's disease.  On Sinemet at home.  -Continue home meds    COPD (chronic obstructive pulmonary disease) ([facility])  Assessment & Plan  -Known history of COPD, on inhalers at home, ran out of medication recently. Not in exacerbation. CXR from outside grossly unremarkable except for right basilar atelectasis.  -RT protocol, oxygen per protocol  -IS      BPH (benign prostatic hyperplasia)  Assessment & Plan  -On finasteride and tamsulosin at home  -Continue home medications    Prostatitis  Assessment & Plan  History of prostatitis, on fifth week of Bactrim at home.  Finished possible 6 weeks course of Bactrim.  Patient not sure about exact duration  -Repeat UA    Pancreatitis, chronic ([facility])  Assessment & Plan  -History of chronic pancreatitis.  Recent CT abdomen/pelvis from outside negative for pancreatic mass/malignancy.  No formal diagnosis of pancreatic malignancy.  CA-19-9 normal. On enzyme supplementation at home  -Resume enzyme supplements

## 2020-06-28 NOTE — CARE PLAN
Problem: Communication  Goal: The ability to communicate needs accurately and effectively will improve  Outcome: PROGRESSING AS EXPECTED   Updated on POC  Problem: Safety  Goal: Will remain free from injury  Outcome: PROGRESSING AS EXPECTED   Call light within reach  Problem: Bowel/Gastric:  Goal: Normal bowel function is maintained or improved  Outcome: PROGRESSING AS EXPECTED   Patient having normal BMs

## 2020-06-28 NOTE — PROGRESS NOTES
Cardiovascular Surgery Progress Note    Name: Ricky Spangler  MRN: [id number]  : 1950  Admit Date: 2020  6:15 AM  Procedure:  Procedure(s) and Anesthesia Type:     * CABG, WITH ENDOSCOPIC VEIN PROCUREMENT- X3 - General     * ECHOCARDIOGRAM, TRANSESOPHAGEAL - General  3 Day Post-Op    Vitals:  Patient Vitals for the past 8 hrs:   Temp SpO2 O2 Delivery Device O2 (LPM) Pulse Resp BP Weight   20 1104 -- 94 % Silicone Nasal Cannula 1 88 18 -- --   20 0908 36.7 °C (98 °F) 91 % Silicone Nasal Cannula 1 93 17 145/65 --   20 0746 -- 93 % Silicone Nasal Cannula 0.5 68 18 -- --   20 0530 -- -- -- -- -- -- -- 76.9 kg (169 lb 8.5 oz)   20 0435 36.7 °C (98 °F) 93 % Silicone Nasal Cannula 0.5 74 18 118/56 --     Temp (24hrs), Av.6 °C (97.8 °F), Min:36.3 °C (97.4 °F), Max:36.7 °C (98 °F)      Respiratory:    Respiration: 18, Pulse Oximetry: 94 %     Chest Tube Drains:          Fluids:    Intake/Output Summary (Last 24 hours) at 2020 1124  Last data filed at 2020 0735  Gross per 24 hour   Intake 390 ml   Output 1400 ml   Net -1010 ml     Admit weight: Weight: 82 kg (180 lb 12.4 oz)  Current weight: Weight: 76.9 kg (169 lb 8.5 oz) (20 0530)    Labs:  Recent Labs     20  0515 20  0223 20  0130   WBC 16.9* 14.1* 12.2*   RBC 3.72* 3.64* 3.65*   HEMOGLOBIN 11.7* 11.3* 11.3*   HEMATOCRIT 35.6* 34.6* 35.0*   MCV 95.7 95.1 95.9   MCH 31.5 31.0 31.0   MCHC 32.9* 32.7* 32.3*   RDW 48.9 47.8 48.4   PLATELETCT 138* 131* 151*   MPV 10.0 10.1 10.2         Recent Labs     20  0515 20  0223 20  0130   SODIUM 136 135 134*   POTASSIUM 4.8 4.8 5.3   CHLORIDE 103 98 99   CO2 22 28 29   GLUCOSE 109* 115* 134*   BUN 14 16 14   CREATININE 1.02 1.09 1.07   CALCIUM 8.5 8.7 9.0           Medications:  • pantoprazole  40 mg     • Pharmacy Consult Request  1 Each     • acetaminophen  1,000 mg     • gabapentin  300 mg      Followed by   • [START ON 2020]  gabapentin  100 mg     • senna-docusate  2 Tab      And   • polyethylene glycol/lytes  1 Packet      And   • magnesium hydroxide  30 mL     • enoxaparin  40 mg     • mupirocin  1 Application     • metoprolol  25 mg     • aspirin EC  81 mg     • clopidogrel  75 mg     • atorvastatin  80 mg     • carbidopa-levodopa  1 Tab     • finasteride  5 mg     • pancrelipase (Lip-Prot-Amyl)  1 Cap     • PARoxetine  40 mg     • tamsulosin  0.4 mg     • valACYclovir  1,000 mg          Ordered Medications:    ASA - Yes    Plavix - Yes    Post-operative Beta Blockers - Yes    Ace Inhibitor - No; contraindicated because of Normal EF    Statin - Yes    Exam:   Review of Systems   Constitutional: Positive for malaise/fatigue. Negative for chills and fever.   HENT: Negative for ear pain, nosebleeds and tinnitus.    Eyes: Negative for double vision, photophobia and pain.   Respiratory: Negative for cough, hemoptysis and shortness of breath.    Cardiovascular: Negative for chest pain, palpitations, orthopnea, leg swelling and PND.   Gastrointestinal: Positive for nausea. Negative for abdominal pain, blood in stool and vomiting.   Genitourinary: Negative for frequency, hematuria and urgency.   Musculoskeletal: Positive for joint pain.   Skin: Negative for rash.   Neurological: Negative for dizziness, tremors, speech change, focal weakness, seizures and headaches.   Endo/Heme/Allergies: Negative for polydipsia. Does not bruise/bleed easily.   Psychiatric/Behavioral: Negative for hallucinations and memory loss.       Physical Exam  Vitals signs and nursing note reviewed.   Constitutional:       Appearance: Normal appearance.   HENT:      Head: Normocephalic and atraumatic.      Nose: Nose normal.      Mouth/Throat:      Mouth: Mucous membranes are moist.      Pharynx: Oropharynx is clear.   Eyes:      Extraocular Movements: Extraocular movements intact.      Conjunctiva/sclera: Conjunctivae normal.      Pupils: Pupils are equal, round, and  reactive to light.   Neck:      Musculoskeletal: Normal range of motion and neck supple.   Cardiovascular:      Rate and Rhythm: Normal rate and regular rhythm.      Pulses: Normal pulses.   Pulmonary:      Effort: Pulmonary effort is normal.      Comments: Decreased in the bases bilaterally  Chest:      Chest wall: Tenderness present.   Abdominal:      General: Abdomen is flat. Bowel sounds are normal.      Palpations: Abdomen is soft.   Musculoskeletal: Normal range of motion.      Right lower leg: Edema present.      Left lower leg: Edema present.   Skin:     General: Skin is warm and dry.      Coloration: Skin is not jaundiced.      Findings: No rash.   Neurological:      General: No focal deficit present.      Mental Status: He is alert and oriented to person, place, and time. Mental status is at baseline.   Psychiatric:         Mood and Affect: Mood normal.         Quality Measures:   Quality-Core Measures   Reviewed items::  EKG reviewed, Radiology images reviewed, Labs reviewed and Medications reviewed  Victor catheter::  No Victor  DVT prophylaxis pharmacological::  Enoxaparin (Lovenox)  DVT prophylaxis - mechanical:  Not indicated at this time, ambulatory  Ulcer Prophylaxis::  No      Assessment/Plan:  POD #1:  HDS.  Off all gtts.  Moderate chest tube output overnight.  Pain controlled.  Patient nauseated. Improved after BM.  Wounds CDI.  Weight registered as down??  Fluid positive.   Plan:  Keep mediastinal tubes.  Keep victor for strict I/O.  Begin gentle diuresis.  Up and out of bed as tolerated.  POD #2:  HDS.  NSR with RBBB.  Minimal chest tube output overnight.  Heartburn/nausea.  States he does better with Protonix vs prilosec.  Pain controlled.  Diuresing well.  WBC trending downward.  Plan:  D/c mediastinal tubes.  D/c victor.  Switch to protonix.  Continue diuresis.  Up and out of bed as tolerated.  Encourage IS.   POD #3:  HDS.  NSR with RBBB.  Protonix improved heartburn.  Pain well controlled.   WBC continues to trend down.  Patient should be completing Bactrim as of tomorrow (6 week course).  Clarify, but will discontinue on discharge.   Weight below admit.  Fluid balance down.  Plan:  Switch to oral lasix.  Replace K+.  Home oxygen orders/face to face complete. Up and out of bed as tolerated.  Attempt to wean oxygen.  Discussed discharge with family.  Plan for home Tuesday.     Active Hospital Problems    Diagnosis   • NSTEMI (non-ST elevated myocardial infarction) ([facility]) [I21.4]     Priority: High   • Parkinson's disease ([facility]) [G20]     Priority: Medium   • Hypertension [I10]     Priority: Medium   • Type 2 diabetes mellitus ([facility]) [E11.9]     Priority: Medium   • Pancreatitis, chronic ([facility]) [K86.1]     Priority: Low   • Prostatitis [N41.9]     Priority: Low   • BPH (benign prostatic hyperplasia) [N40.0]     Priority: Low   • COPD (chronic obstructive pulmonary disease) ([facility]) [J44.9]     Priority: Low

## 2020-06-28 NOTE — CARE PLAN
Problem: Communication  Goal: The ability to communicate needs accurately and effectively will improve  Outcome: PROGRESSING AS EXPECTED  Intervention: Rheems patient and significant other/support system to call light to alert staff of needs  Oriented to:: All of the Following : Location of Bathroom, Visiting Policy, Unit Routine, Call Light and Bedside Controls, Bedside Rail Policy, Smoking Policy, Rights and Responsibilities, Bedside Report, and Patient Education Notebook     Problem: Post op day 2 CABG/Heart Valve Replacement  Goal: Optimal care of the post op CABG/heart valve replacement post op day 2  Outcome: PROGRESSING AS EXPECTED  Intervention: FSBS: when 2 consecutive BS < 130 after post op day 2, discontinue FSBS unless patient is insulin dependent diabetic  Note: Patient had two FSBS <130 and is non insulin dependent. FSBS discontinued.  Intervention: Daily Weights  Note: Will weigh in the morning.  Intervention: Up in chair for all meals  Note: Completed.  Intervention: Ambulate, increasing the distance each time x 3 and before bed  Note: Completed.  Intervention: Stand at sink and wash up with assistance.  Clean incisions twice daily with soap and water.  Note: Incision care completed.  Intervention: IS q 1 hour while awake and record best IS volume  Note: Completed.

## 2020-06-28 NOTE — FACE TO FACE
"Face to Face Note  -  Durable Medical Equipment    Fabiola Bernal P.A.-C. - NPI: [id number]  I certify that this patient is under my care and that they had a durable medical equipment(DME)face to face encounter by myself that meets the physician DME face-to-face encounter requirements with this patient on:    Date of encounter:   Patient:                    MRN:                       YOB: 2020  Ricky Spangler  [id number]  1950     The encounter with the patient was in whole, or in part, for the following medical condition, which is the primary reason for durable medical equipment:  Cardiac Disease and Post-Op Surgery    I certify that, based on my findings, the following durable medical equipment is medically necessary:  Oxygen.    HOME O2 Saturation Measurements:(Values must be present for Home Oxygen orders)  Room air sat at rest: 90  Room air sat with amb: 87  With liters of O2: 1, O2 sat at rest with O2: 91  With Liters of O2: 2, O2 sat with amb with O2 : 92  Is the patient mobile?: Yes    My Clinical findings support the need for the above equipment due to:  Hypoxia, Wound/Incision    Supporting Symptoms: The patient requires supplemental oxygen, as the following interventions have been tried with limited or no improvement: \"Ambulation with oximetry and \"Incentive spirometry    "

## 2020-06-28 NOTE — PROGRESS NOTES
CVC double lumen brown port not flushing or drawing.  White port only flushing. Fabiola Bernal PA-C, notified.  Verbal order to remove.

## 2020-06-28 NOTE — PROGRESS NOTES
Patient complaining of indigestion, patient yelling at staff and refusing Maalox PRN. Patient demanding Protonix. Patient educated that he has that as a once a day order and that I will have to call the doctor and get an order for the medication. Patient demanding that I call now. Page out out to cardiology surgery for new orders.

## 2020-06-28 NOTE — PROGRESS NOTES
Assumed care of patient, bedside report received from Myla MARIN. Updated on POC, call light within reach and fall precautions in place. Bed locked and in lowest position. Patient instructed to call for assistance before getting out of bed. All questions answered, no other needs at this time.

## 2020-06-29 LAB
ANION GAP SERPL CALC-SCNC: 8 MMOL/L (ref 7–16)
BUN SERPL-MCNC: 13 MG/DL (ref 8–22)
CALCIUM SERPL-MCNC: 8.8 MG/DL (ref 8.5–10.5)
CHLORIDE SERPL-SCNC: 97 MMOL/L (ref 96–112)
CO2 SERPL-SCNC: 29 MMOL/L (ref 20–33)
CREAT SERPL-MCNC: 1.09 MG/DL (ref 0.5–1.4)
ERYTHROCYTE [DISTWIDTH] IN BLOOD BY AUTOMATED COUNT: 48.5 FL (ref 35.9–50)
ERYTHROCYTE [DISTWIDTH] IN BLOOD BY AUTOMATED COUNT: 48.9 FL (ref 35.9–50)
GLUCOSE SERPL-MCNC: 119 MG/DL (ref 65–99)
HCT VFR BLD AUTO: 33.4 % (ref 42–52)
HCT VFR BLD AUTO: 34.6 % (ref 42–52)
HEMOCCULT STL QL: POSITIVE
HGB BLD-MCNC: 10.9 G/DL (ref 14–18)
HGB BLD-MCNC: 11.5 G/DL (ref 14–18)
MCH RBC QN AUTO: 31.3 PG (ref 27–33)
MCH RBC QN AUTO: 31.3 PG (ref 27–33)
MCHC RBC AUTO-ENTMCNC: 32.6 G/DL (ref 33.7–35.3)
MCHC RBC AUTO-ENTMCNC: 33.2 G/DL (ref 33.7–35.3)
MCV RBC AUTO: 94.3 FL (ref 81.4–97.8)
MCV RBC AUTO: 96 FL (ref 81.4–97.8)
PLATELET # BLD AUTO: 193 K/UL (ref 164–446)
PLATELET # BLD AUTO: 205 K/UL (ref 164–446)
PMV BLD AUTO: 9.6 FL (ref 9–12.9)
PMV BLD AUTO: 9.9 FL (ref 9–12.9)
POTASSIUM SERPL-SCNC: 4.7 MMOL/L (ref 3.6–5.5)
RBC # BLD AUTO: 3.48 M/UL (ref 4.7–6.1)
RBC # BLD AUTO: 3.67 M/UL (ref 4.7–6.1)
SODIUM SERPL-SCNC: 134 MMOL/L (ref 135–145)
WBC # BLD AUTO: 9.4 K/UL (ref 4.8–10.8)
WBC # BLD AUTO: 9.7 K/UL (ref 4.8–10.8)

## 2020-06-29 PROCEDURE — 85027 COMPLETE CBC AUTOMATED: CPT

## 2020-06-29 PROCEDURE — C9113 INJ PANTOPRAZOLE SODIUM, VIA: HCPCS | Performed by: PHYSICIAN ASSISTANT

## 2020-06-29 PROCEDURE — 80048 BASIC METABOLIC PNL TOTAL CA: CPT

## 2020-06-29 PROCEDURE — A9270 NON-COVERED ITEM OR SERVICE: HCPCS | Performed by: PHYSICIAN ASSISTANT

## 2020-06-29 PROCEDURE — 700102 HCHG RX REV CODE 250 W/ 637 OVERRIDE(OP): Performed by: PHYSICIAN ASSISTANT

## 2020-06-29 PROCEDURE — A9270 NON-COVERED ITEM OR SERVICE: HCPCS | Performed by: STUDENT IN AN ORGANIZED HEALTH CARE EDUCATION/TRAINING PROGRAM

## 2020-06-29 PROCEDURE — 82272 OCCULT BLD FECES 1-3 TESTS: CPT

## 2020-06-29 PROCEDURE — 94760 N-INVAS EAR/PLS OXIMETRY 1: CPT

## 2020-06-29 PROCEDURE — 700111 HCHG RX REV CODE 636 W/ 250 OVERRIDE (IP): Performed by: PHYSICIAN ASSISTANT

## 2020-06-29 PROCEDURE — 99024 POSTOP FOLLOW-UP VISIT: CPT | Performed by: THORACIC SURGERY (CARDIOTHORACIC VASCULAR SURGERY)

## 2020-06-29 PROCEDURE — 700102 HCHG RX REV CODE 250 W/ 637 OVERRIDE(OP): Performed by: INTERNAL MEDICINE

## 2020-06-29 PROCEDURE — 700102 HCHG RX REV CODE 250 W/ 637 OVERRIDE(OP): Performed by: STUDENT IN AN ORGANIZED HEALTH CARE EDUCATION/TRAINING PROGRAM

## 2020-06-29 PROCEDURE — 99232 SBSQ HOSP IP/OBS MODERATE 35: CPT | Mod: GC | Performed by: INTERNAL MEDICINE

## 2020-06-29 PROCEDURE — A9270 NON-COVERED ITEM OR SERVICE: HCPCS | Performed by: INTERNAL MEDICINE

## 2020-06-29 PROCEDURE — 770020 HCHG ROOM/CARE - TELE (206)

## 2020-06-29 PROCEDURE — 36415 COLL VENOUS BLD VENIPUNCTURE: CPT

## 2020-06-29 RX ORDER — CLOPIDOGREL BISULFATE 75 MG/1
75 TABLET ORAL DAILY
Status: DISCONTINUED | OUTPATIENT
Start: 2020-06-30 | End: 2020-07-01 | Stop reason: HOSPADM

## 2020-06-29 RX ADMIN — TAMSULOSIN HYDROCHLORIDE 0.4 MG: 0.4 CAPSULE ORAL at 08:35

## 2020-06-29 RX ADMIN — MUPIROCIN 1 APPLICATION: 20 OINTMENT TOPICAL at 05:47

## 2020-06-29 RX ADMIN — PANTOPRAZOLE SODIUM 40 MG: 40 INJECTION, POWDER, LYOPHILIZED, FOR SOLUTION INTRAVENOUS at 05:39

## 2020-06-29 RX ADMIN — ASPIRIN 81 MG: 81 TABLET, COATED ORAL at 08:34

## 2020-06-29 RX ADMIN — METOPROLOL TARTRATE 25 MG: 25 TABLET, FILM COATED ORAL at 18:22

## 2020-06-29 RX ADMIN — FINASTERIDE 5 MG: 5 TABLET, FILM COATED ORAL at 08:35

## 2020-06-29 RX ADMIN — SUCRALFATE 1 G: 1 TABLET ORAL at 00:57

## 2020-06-29 RX ADMIN — CARBIDOPA AND LEVODOPA 1 TABLET: 25; 100 TABLET ORAL at 18:22

## 2020-06-29 RX ADMIN — GABAPENTIN 300 MG: 300 CAPSULE ORAL at 18:22

## 2020-06-29 RX ADMIN — FUROSEMIDE 20 MG: 20 TABLET ORAL at 05:46

## 2020-06-29 RX ADMIN — MUPIROCIN 1 APPLICATION: 20 OINTMENT TOPICAL at 18:23

## 2020-06-29 RX ADMIN — METFORMIN HYDROCHLORIDE 500 MG: 500 TABLET ORAL at 18:22

## 2020-06-29 RX ADMIN — ATORVASTATIN CALCIUM 80 MG: 80 TABLET, FILM COATED ORAL at 18:22

## 2020-06-29 RX ADMIN — CLOPIDOGREL BISULFATE 75 MG: 75 TABLET ORAL at 08:34

## 2020-06-29 RX ADMIN — CARBIDOPA AND LEVODOPA 1 TABLET: 25; 100 TABLET ORAL at 08:34

## 2020-06-29 RX ADMIN — SUCRALFATE 1 G: 1 TABLET ORAL at 18:22

## 2020-06-29 RX ADMIN — VALACYCLOVIR HYDROCHLORIDE 1000 MG: 500 TABLET, FILM COATED ORAL at 08:34

## 2020-06-29 RX ADMIN — METFORMIN HYDROCHLORIDE 500 MG: 500 TABLET ORAL at 08:35

## 2020-06-29 RX ADMIN — SUCRALFATE 1 G: 1 TABLET ORAL at 14:31

## 2020-06-29 RX ADMIN — SUCRALFATE 1 G: 1 TABLET ORAL at 05:46

## 2020-06-29 RX ADMIN — GABAPENTIN 300 MG: 300 CAPSULE ORAL at 08:34

## 2020-06-29 RX ADMIN — CARBIDOPA AND LEVODOPA 1 TABLET: 25; 100 TABLET ORAL at 14:32

## 2020-06-29 RX ADMIN — PANTOPRAZOLE SODIUM 40 MG: 40 INJECTION, POWDER, LYOPHILIZED, FOR SOLUTION INTRAVENOUS at 14:32

## 2020-06-29 RX ADMIN — PAROXETINE HYDROCHLORIDE 40 MG: 20 TABLET, FILM COATED ORAL at 05:45

## 2020-06-29 ASSESSMENT — ENCOUNTER SYMPTOMS
CARDIOVASCULAR NEGATIVE: 1
PSYCHIATRIC NEGATIVE: 1
DIARRHEA: 0
VOMITING: 0
COUGH: 0
SHORTNESS OF BREATH: 0
WHEEZING: 0
MUSCULOSKELETAL NEGATIVE: 1
DOUBLE VISION: 0
FEVER: 0
BACK PAIN: 0
CHILLS: 0
DEPRESSION: 0
NAUSEA: 0
EYES NEGATIVE: 1
ORTHOPNEA: 0
FOCAL WEAKNESS: 0
PALPITATIONS: 0
GASTROINTESTINAL NEGATIVE: 1
DIZZINESS: 0
HEADACHES: 0
ABDOMINAL PAIN: 0
RESPIRATORY NEGATIVE: 1
NEUROLOGICAL NEGATIVE: 1

## 2020-06-29 ASSESSMENT — FIBROSIS 4 INDEX: FIB4 SCORE: 2.06

## 2020-06-29 NOTE — PROGRESS NOTES
"Patient reports that last BM was black and appears like \"coffee grounds.\" patient states he has had this before when he had a diverticulitis flare up. Page out to CT surgery to see if they would like to continue lovenox.   "

## 2020-06-29 NOTE — PROGRESS NOTES
Assumed care of patient, bedside report received from Elyssa MARIN. Updated on POC, call light within reach and fall precautions in place. Bed locked and in lowest position. Patient instructed to call for assistance before getting out of bed. All questions answered, no other needs at this time.

## 2020-06-29 NOTE — PROGRESS NOTES
Cardiac Surgery RN Navigator Daily Rounding Note  Procedure Performed: Procedure(s):  CABG, WITH ENDOSCOPIC VEIN PROCUREMENT- X3  ECHOCARDIOGRAM, TRANSESOPHAGEAL     By  Dr. Linda  4 Days Post-Op    Pertinent Overnight Events:    Dark stools yesterday per patient- Lovenox held; H&H stable    Weight below baseline      Pertinent neuro findings/needs: A&O    Cardiac/hemodynamics:  Temp (24hrs), Av.8 °C (98.2 °F), Min:36.4 °C (97.6 °F), Max:37.3 °C (99.1 °F)    Heart rhythm: SR with BBB  Temp:  [36.4 °C (97.6 °F)-37.3 °C (99.1 °F)] 36.7 °C (98.1 °F)  Pulse:  [61-93] 75  Resp:  [16-18] 16  BP: (105-145)/(52-65) 113/57  SpO2:  [91 %-95 %] 91 %      12-hour chest tube output: n/a    /Weights:  Admit weight: Weight: 82 kg (180 lb 12.4 oz)  Current Weight: Weight: 76.3 kg (168 lb 3.4 oz) (20 0500)  Weight change: -0.6 kg (-1 lb 5.2 oz)    Intake/Output Summary (Last 24 hours) at 2020 0746  Last data filed at 2020 0552  Gross per 24 hour   Intake 240 ml   Output 1100 ml   Net -860 ml       Adequate urine output: yes 600 cc    Respiratory:        Pertinent respiratory findings/needs: .5 liters NC 91%    GI findings/needs: + BM    Labs:  Recent Labs     20  0130 20  2102 20  0244   WBC 12.2* 14.4* 9.4   RBC 3.65* 3.81* 3.48*   HEMOGLOBIN 11.3* 11.9* 10.9*   HEMATOCRIT 35.0* 37.1* 33.4*   MCV 95.9 97.4 96.0   MCH 31.0 31.2 31.3   MCHC 32.3* 32.1* 32.6*   RDW 48.4 49.7 48.9   PLATELETCT 151* 217 193   MPV 10.2 9.9 9.9     Recent Labs     20  0223 20  0130 20  0244   SODIUM 135 134* 134*   POTASSIUM 4.8 5.3 4.7   CHLORIDE 98 99 97   CO2 28 29 29   GLUCOSE 115* 134* 119*   BUN 16 14 13   CREATININE 1.09 1.07 1.09   CALCIUM 8.7 9.0 8.8     INR:       Required Cardiac medications review:  ASA:  Yes  Plavix: Yes  BB: Yes  ACE/ARB: No; contraindicated because of Normal EF  Statin: yes  Diuretic: yes    LDA's necessity reviewed: YES      Thoughts and considerations for  team rounding:    None-patient was encouraged to utilized IS rigorously today to try and get off O2.  I discussed his options if insurance would not cover home O2, pay out of pocket for O2 or work on IS and cough and deep breathe to wean off O2 by tomorrow.    Discharge plan:    Insurance may not approve home O2 as amount and duration may not qualify him-he is willing to pay out of pocket if insurance won't cover it.

## 2020-06-29 NOTE — CARE PLAN
Problem: Pain Management  Goal: Pain level will decrease to patient's comfort goal  Outcome: PROGRESSING AS EXPECTED  Intervention: Educate and implement non-pharmacologic comfort measures. Examples: relaxation, distration, play therapy, activity therapy, massage, etc.  Intervention: Heat Applied, rest, nausea medication per MAR      Problem: Post Op Day 1 CABG/Heart Valve Replacement  Goal: Optimal care of the post op CABG/heart valve replacement Post Op Day 1  Intervention: Daily Weights  Note: Will weigh in the morning.  Intervention: Up in chair for all meals  Note: Completed.  Intervention: IS q 1 hour while awake and record best IS volume  Note: Completed.

## 2020-06-29 NOTE — DISCHARGE PLANNING
Agency/Facility Name: [employer]  Spoke To: Asmita   Outcome: Per Asmita referral was not received complete. [facility] refaxed referral.    Received Choice form at 1120  Agency/Facility Name: [facility]   Referral sent per [facility] form @ 2421

## 2020-06-29 NOTE — PROGRESS NOTES
Cardiovascular Surgery Progress Note    Name: Ricky Spangler  MRN: [id number]  : 1950  Admit Date: 2020  6:15 AM  Procedure:  Procedure(s) and Anesthesia Type:     * CABG, WITH ENDOSCOPIC VEIN PROCUREMENT- X3 - General     * ECHOCARDIOGRAM, TRANSESOPHAGEAL - General  3 Day Post-Op    Vitals:  Patient Vitals for the past 8 hrs:   Temp SpO2 O2 Delivery Device O2 (LPM) Pulse Resp BP   20 1131 36.7 °C (98 °F) 91 % Silicone Nasal Cannula 0.5 90 18 108/59   20 0725 37.2 °C (98.9 °F) 93 % Silicone Nasal Cannula 0.5 78 18 (!) 91/74   20 0709 -- -- -- -- -- -- (!) 91/54     Temp (24hrs), Av.9 °C (98.4 °F), Min:36.7 °C (98 °F), Max:37.3 °C (99.1 °F)      Respiratory:    Respiration: 18, Pulse Oximetry: 91 %     Chest Tube Drains:          Fluids:    Intake/Output Summary (Last 24 hours) at 2020 1506  Last data filed at 2020 1200  Gross per 24 hour   Intake 740 ml   Output 1950 ml   Net -1210 ml     Admit weight: Weight: 82 kg (180 lb 12.4 oz)  Current weight: Weight: 76.3 kg (168 lb 3.4 oz) (20 0500)    Labs:  Recent Labs     20  2102 20  0244 20  1214   WBC 14.4* 9.4 9.7   RBC 3.81* 3.48* 3.67*   HEMOGLOBIN 11.9* 10.9* 11.5*   HEMATOCRIT 37.1* 33.4* 34.6*   MCV 97.4 96.0 94.3   MCH 31.2 31.3 31.3   MCHC 32.1* 32.6* 33.2*   RDW 49.7 48.9 48.5   PLATELETCT 217 193 205   MPV 9.9 9.9 9.6         Recent Labs     20  0223 20  0130 20  0244   SODIUM 135 134* 134*   POTASSIUM 4.8 5.3 4.7   CHLORIDE 98 99 97   CO2 28 29 29   GLUCOSE 115* 134* 119*   BUN 16 14 13   CREATININE 1.09 1.07 1.09   CALCIUM 8.7 9.0 8.8           Medications:  • furosemide  20 mg     • metFORMIN  500 mg     • pantoprazole  40 mg     • sucralfate  1 g     • Pharmacy Consult Request  1 Each     • acetaminophen  1,000 mg     • gabapentin  300 mg      Followed by   • [START ON 2020] gabapentin  100 mg     • senna-docusate  2 Tab      And   • polyethylene glycol/lytes  1  Packet      And   • magnesium hydroxide  30 mL     • mupirocin  1 Application     • metoprolol  25 mg     • aspirin EC  81 mg     • atorvastatin  80 mg     • carbidopa-levodopa  1 Tab     • finasteride  5 mg     • pancrelipase (Lip-Prot-Amyl)  1 Cap     • PARoxetine  40 mg     • tamsulosin  0.4 mg     • valACYclovir  1,000 mg          Ordered Medications:    ASA - Yes    Plavix - Yes    Post-operative Beta Blockers - Yes    Ace Inhibitor - No; contraindicated because of Normal EF    Statin - Yes    Exam:   Review of Systems   Constitutional: Positive for malaise/fatigue.   HENT: Negative.    Eyes: Negative.    Respiratory: Negative.    Cardiovascular: Negative.    Gastrointestinal: Negative.    Genitourinary: Negative.    Musculoskeletal: Negative.    Skin: Negative.    Neurological: Negative.    Endo/Heme/Allergies: Negative.    Psychiatric/Behavioral: Negative.        Physical Exam  Vitals signs and nursing note reviewed.   Constitutional:       Appearance: Normal appearance.   HENT:      Head: Normocephalic and atraumatic.      Nose: Nose normal.      Mouth/Throat:      Mouth: Mucous membranes are moist.      Pharynx: Oropharynx is clear.   Eyes:      Extraocular Movements: Extraocular movements intact.      Conjunctiva/sclera: Conjunctivae normal.      Pupils: Pupils are equal, round, and reactive to light.   Neck:      Musculoskeletal: Normal range of motion and neck supple.   Cardiovascular:      Rate and Rhythm: Normal rate and regular rhythm.      Pulses: Normal pulses.   Pulmonary:      Effort: Pulmonary effort is normal.      Comments: Decreased in the bases bilaterally  Abdominal:      General: Abdomen is flat. Bowel sounds are normal.      Palpations: Abdomen is soft.   Musculoskeletal: Normal range of motion.   Skin:     General: Skin is warm and dry.      Coloration: Skin is not jaundiced.      Findings: No rash.   Neurological:      General: No focal deficit present.      Mental Status: He is alert  and oriented to person, place, and time. Mental status is at baseline.   Psychiatric:         Mood and Affect: Mood normal.         Quality Measures:   Quality-Core Measures   Reviewed items::  EKG reviewed, Radiology images reviewed, Labs reviewed and Medications reviewed      Assessment/Plan:  POD #1:  HDS.  Off all gtts.  Moderate chest tube output overnight.  Pain controlled.  Patient nauseated. Improved after BM.  Wounds CDI.  Weight registered as down??  Fluid positive.   Plan:  Keep mediastinal tubes.  Keep victor for strict I/O.  Begin gentle diuresis.  Up and out of bed as tolerated.  POD #2:  HDS.  NSR with RBBB.  Minimal chest tube output overnight.  Heartburn/nausea.  States he does better with Protonix vs prilosec.  Pain controlled.  Diuresing well.  WBC trending downward.  Plan:  D/c mediastinal tubes.  D/c victor.  Switch to protonix.  Continue diuresis.  Up and out of bed as tolerated.  Encourage IS.   POD #3:  HDS.  NSR with RBBB.  Protonix improved heartburn.  Pain well controlled.  WBC continues to trend down.  Patient should be completing Bactrim as of tomorrow (6 week course).  Clarify, but will discontinue on discharge.   Weight below admit.  Fluid balance down.  Plan:  Switch to oral lasix.  Replace K+.  Home oxygen orders/face to face complete. Up and out of bed as tolerated.  Attempt to wean oxygen.  Discussed discharge with family.  Plan for home Tuesday.   POD 4 HDS, SR, Gi Bleed- Rpt HGB - stable- two more BM's not black (per report)- re-check H/H in am- on protonix/carafate, If h/h low tomorrow will consult GI, Oxygen 0.5 lpm- IS/Amb    Active Hospital Problems    Diagnosis   • NSTEMI (non-ST elevated myocardial infarction) (HCC) [I21.4]     Priority: High   • Parkinson's disease (HCC) [G20]     Priority: Medium   • Hypertension [I10]     Priority: Medium   • Type 2 diabetes mellitus (HCC) [E11.9]     Priority: Medium   • Pancreatitis, chronic (HCC) [K86.1]     Priority: Low   •  Prostatitis [N41.9]     Priority: Low   • BPH (benign prostatic hyperplasia) [N40.0]     Priority: Low   • COPD (chronic obstructive pulmonary disease) (HCC) [J44.9]     Priority: Low

## 2020-06-29 NOTE — DISCHARGE PLANNING
Anticipated Discharge Disposition: Home with home O2.    Action: Spoke with Ara with [facility]. They state they spoke with Pt's wife about the denial, and she is OK to pay $95/month for a portable unit. Ara said they are open M-F 9-5, and Pt's wife can  O2 anytime during these hours. This RNCM spoke with Pt's wife, she stated she will get the O2 from [facility] today.    Barriers to Discharge: Pending medical clearance.    Plan: See above.

## 2020-06-29 NOTE — FACE TO FACE
Face to Face Note  -  Durable Medical Equipment    CARMELA Alonzo - NPI: [id number]  I certify that this patient is under my care and that they had a durable medical equipment(DME)face to face encounter by myself that meets the physician DME face-to-face encounter requirements with this patient on:    Date of encounter:   Patient:                    MRN:                       YOB: 2020  Ricky Spangler  [id number]  1950     The encounter with the patient was in whole, or in part, for the following medical condition, which is the primary reason for durable medical equipment:  Cardiac Disease, Post-Op Surgery and Other - restrictive airway disease    I certify that, based on my findings, the following durable medical equipment is medically necessary:  Oxygen.    HOME O2 Saturation Measurements:(Values must be present for Home Oxygen orders)  Room air sat at rest: 90  Room air sat with amb: 87  With liters of O2: 0.5, O2 sat at rest with O2: 93  With Liters of O2: 0.5, O2 sat with amb with O2 : 92  Is the patient mobile?: Yes    My Clinical findings support the need for the above equipment due to:  Hypoxia    Supporting Symptoms: patient desats when ambulating.

## 2020-06-29 NOTE — CARE PLAN
Problem: Communication  Goal: The ability to communicate needs accurately and effectively will improve  Outcome: PROGRESSING AS EXPECTED   Updated on POC  Problem: Safety  Goal: Will remain free from injury  Outcome: PROGRESSING AS EXPECTED   Patient encouraged to call before getting OOB. Bed alarm in place  Problem: Post Op Day 4 CABG/Heart Valve Replacement  Goal: Optimal care of the Post Op CABG/Heart Valve replacement Post Op Day 4  Outcome: PROGRESSING AS EXPECTED   Patient updated on POC for POD 4, patient agreeable to care plan.

## 2020-06-29 NOTE — DISCHARGE PLANNING
Anticipated Discharge Disposition: home with oxygen    Action: RN LAKHWINDER spoke with patient regarding home oxygen on Sunday and he gave verbal consent to send referral to [facility] as they service [place]. Choice faxed to [facility]. Patient's family can pick equipment up there in [place] and bring to [facility] when they are picking patient up at discharge.    Barriers to Discharge: oxygen set up pending medical clearance    Plan: Case coordination to f/u with oxygen referral

## 2020-06-29 NOTE — PROGRESS NOTES
Daily Progress Note:     Date of Service: 6/29/2020  Primary Team: [facility] IM Blue Team   Attending: Dr Mily M.D.   Senior Resident: Dr. Campbell  Intern: Dr. Suarez  Contact:  [phone]    Chief Complaint:   Chest pain    ID: 69-year-old male transferred from outside hospital for further management of NSTEMI.  Receiving heparin drip.  Cardiac cath remarkable for multivessel disease and reduced EF 45%.  Patient underwent CABG on 6/25/2020    Subjective:   -Pt had an episode of dark stools yesterday, denies chest pain/SOB/dizziness. No further episodes. Stool occult blood positive CT surgery updated, lovenox held. Hb 10.9 this am  -Pt reports feeling better this am, reports improvement in abdominal discomfort. Denies N/V  -Minimal pain at surgical site. Occasional SOB    Dispo: Pending cardiothoracic clearance    Consultants/Specialty:  Cardiology  Cardio Thoracic surgery    Review of Systems:    Review of Systems   Constitutional: Negative for chills and fever.   HENT: Negative for ear pain.    Eyes: Negative for double vision.   Respiratory: Negative for cough, shortness of breath and wheezing.    Cardiovascular: Positive for chest pain. Negative for palpitations, orthopnea and leg swelling.        Minimal surgical site pain   Gastrointestinal: Negative for abdominal pain, diarrhea, nausea and vomiting.   Genitourinary: Negative for dysuria, frequency and hematuria.   Musculoskeletal: Negative for back pain.   Neurological: Negative for dizziness, focal weakness and headaches.   Psychiatric/Behavioral: Negative for depression.       Objective Data:   Physical Exam:   Vitals:   Temp:  [36.7 °C (98.1 °F)-37.3 °C (99.1 °F)] 37.2 °C (98.9 °F)  Pulse:  [70-78] 78  Resp:  [16-18] 18  BP: ()/(52-74) 91/74  SpO2:  [91 %-95 %] 93 %    Physical Exam  Constitutional:       General: He is not in acute distress.  HENT:      Head: Normocephalic and atraumatic.      Nose: No rhinorrhea.      Mouth/Throat:      Mouth: Mucous  membranes are moist.   Eyes:      Extraocular Movements: Extraocular movements intact.      Pupils: Pupils are equal, round, and reactive to light.   Neck:      Musculoskeletal: Normal range of motion.   Cardiovascular:      Rate and Rhythm: Normal rate.      Pulses: Normal pulses.      Heart sounds: No murmur.      Comments: Surgical site dressings intact, no signs of leakage  Pulmonary:      Effort: Pulmonary effort is normal. No respiratory distress.      Breath sounds: No wheezing.   Abdominal:      General: Abdomen is flat. There is no distension.      Palpations: Abdomen is soft.      Tenderness: There is no abdominal tenderness.   Musculoskeletal: Normal range of motion.         General: No swelling.   Skin:     General: Skin is warm and dry.   Neurological:      General: No focal deficit present.      Mental Status: He is alert and oriented to person, place, and time. Mental status is at baseline.   Psychiatric:         Mood and Affect: Mood normal.         Behavior: Behavior normal.         Quality Measures  Quality-Core Measures   Reviewed items::  EKG reviewed, Labs reviewed and Medications reviewed  Anderson catheter::  No Anderson  DVT prophylaxis - mechanical:  SCDs  Ulcer Prophylaxis::  Yes      Labs:   Recent Labs     06/28/20 0130 06/28/20 2102 06/29/20  0244   WBC 12.2* 14.4* 9.4   RBC 3.65* 3.81* 3.48*   HEMOGLOBIN 11.3* 11.9* 10.9*   HEMATOCRIT 35.0* 37.1* 33.4*   MCV 95.9 97.4 96.0   MCH 31.0 31.2 31.3   RDW 48.4 49.7 48.9   PLATELETCT 151* 217 193   MPV 10.2 9.9 9.9      Recent Labs     06/27/20 0223 06/28/20 0130 06/29/20  0244   SODIUM 135 134* 134*   POTASSIUM 4.8 5.3 4.7   CHLORIDE 98 99 97   CO2 28 29 29   GLUCOSE 115* 134* 119*   BUN 16 14 13      Recent Labs     06/27/20 0223 06/28/20  0130 06/29/20  0244   CREATININE 1.09 1.07 1.09      Lab Results   Component Value Date/Time    PROTHROMBTM 13.3 06/25/2020 03:35 AM    INR 0.98 06/25/2020 03:35 AM         Imaging:   DX-CHEST-2 VIEWS    Final Result      Mild right basilar atelectasis. No focal consolidation or pleural effusions.      DX-CHEST-PORTABLE (1 VIEW)   Final Result      Minimal stable left basilar atelectasis. Unremarkable chest appearance otherwise.      EC-BRODY W/O CONT         DX-CHEST-PORTABLE (1 VIEW)   Final Result      1.  Perihilar interstitial opacities likely represent atelectasis. There may be a component of interstitial edema.      2.  Endotracheal tube tip projects approximately 1.8 cm above the gavino.      3.  Right internal jugular catheter tip projects over the cavoatrial junction. No pneumothorax is identified.      CT-CHEST (THORAX) W/O   Final Result         1. Opacity seen on the recent radiograph represents bony proliferation at the right first costosternal junction. No suspicious pulmonary nodules or masses.   2. Atherosclerosis and coronary calcifications.   3. Colonic diverticulosis.      US-CAROTID DOPPLER BILAT   Final Result      US-VEIN MAPPING LOWER EXTREMITY BILAT   Final Result      DX-CHEST-PORTABLE (1 VIEW)   Final Result      Focal opacity projecting over the first costochondral junction on the right. This may be related to asymmetric prominence of the costochondral junction but an underlying mass is not excluded. Further evaluation can be performed with CT chest.         EC-ECHOCARDIOGRAM COMPLETE W/ CONT   Final Result      OUTSIDE IMAGES-CT ABDOMEN /PELVIS   Final Result      OUTSIDE IMAGES-DX CHEST   Final Result      CL-LEFT HEART CATHETERIZATION WITH POSSIBLE INTERVENTION    (Results Pending)        NSTEMI (non-ST elevated myocardial infarction) (HCC)  Assessment & Plan  Patient admitted with NSTEMI.  Cardiac cath [6/22/2020] remarkable for multivessel disease, EF 45%.  CABG recommended. S/p CABG on 6/25/2020.  HbA1c 6.5, lipid panel normal.      -Continue ASA, plavix, statin, lasix  -Continue low-dose metoprolol.   -ACEI/ARB with held due to low SBP    Type 2 diabetes mellitus (HCC)  Assessment  & Plan  -Known history of type 2 diabetes mellitus.  On metformin at home.  HbA1c 6.5   -continue metformin  -Maintain euglycemia, blood glucose 140 to 180 mg/dL    Hypertension  Assessment & Plan  -Known history of hypertension.  On losartan at home. Held due to low SBP  -Resume medications when appropriate    Parkinson's disease ([facility])  Assessment & Plan  -Known history of Parkinson's disease.  On Sinemet at home.  -Continue home meds    COPD (chronic obstructive pulmonary disease) ([facility])  Assessment & Plan  -Known history of COPD, on inhalers at home, ran out of medication recently. Not in exacerbation. CXR from outside grossly unremarkable except for right basilar atelectasis.  -RT protocol, oxygen per protocol  -IS      BPH (benign prostatic hyperplasia)  Assessment & Plan  -On finasteride and tamsulosin at home  -Continue home medications    Prostatitis  Assessment & Plan  History of prostatitis, on fifth week of Bactrim at home.  Finished possible 6 weeks course of Bactrim.  Patient not sure about exact duration  -Repeat UA normal    Pancreatitis, chronic ([facility])  Assessment & Plan  -History of chronic pancreatitis.  Recent CT abdomen/pelvis from outside negative for pancreatic mass/malignancy.  No formal diagnosis of pancreatic malignancy.  CA-19-9 normal. On enzyme supplementation at home  -Resume enzyme supplements

## 2020-06-30 PROBLEM — K92.1 MELENA: Status: ACTIVE | Noted: 2020-06-30

## 2020-06-30 LAB
ERYTHROCYTE [DISTWIDTH] IN BLOOD BY AUTOMATED COUNT: 48.6 FL (ref 35.9–50)
FERRITIN SERPL-MCNC: 135 NG/ML (ref 22–322)
HCT VFR BLD AUTO: 32.1 % (ref 42–52)
HEMOCCULT SP1 STL QL: POSITIVE
HGB BLD-MCNC: 10.4 G/DL (ref 14–18)
HGB RETIC QN AUTO: 31.7 PG/CELL (ref 29–35)
IMM RETICS NFR: 24.1 % (ref 9.3–17.4)
IRON SATN MFR SERPL: 9 % (ref 15–55)
IRON SERPL-MCNC: 26 UG/DL (ref 50–180)
MCH RBC QN AUTO: 31.1 PG (ref 27–33)
MCHC RBC AUTO-ENTMCNC: 32.4 G/DL (ref 33.7–35.3)
MCV RBC AUTO: 96.1 FL (ref 81.4–97.8)
PLATELET # BLD AUTO: 223 K/UL (ref 164–446)
PMV BLD AUTO: 9.3 FL (ref 9–12.9)
RBC # BLD AUTO: 3.34 M/UL (ref 4.7–6.1)
RETICS # AUTO: 0.1 M/UL (ref 0.04–0.06)
RETICS/RBC NFR: 2.6 % (ref 0.8–2.1)
TIBC SERPL-MCNC: 283 UG/DL (ref 250–450)
UIBC SERPL-MCNC: 257 UG/DL (ref 110–370)
WBC # BLD AUTO: 7.6 K/UL (ref 4.8–10.8)

## 2020-06-30 PROCEDURE — A9270 NON-COVERED ITEM OR SERVICE: HCPCS | Performed by: INTERNAL MEDICINE

## 2020-06-30 PROCEDURE — 36415 COLL VENOUS BLD VENIPUNCTURE: CPT

## 2020-06-30 PROCEDURE — 770020 HCHG ROOM/CARE - TELE (206)

## 2020-06-30 PROCEDURE — 97165 OT EVAL LOW COMPLEX 30 MIN: CPT

## 2020-06-30 PROCEDURE — C9113 INJ PANTOPRAZOLE SODIUM, VIA: HCPCS | Performed by: PHYSICIAN ASSISTANT

## 2020-06-30 PROCEDURE — 85027 COMPLETE CBC AUTOMATED: CPT

## 2020-06-30 PROCEDURE — A9270 NON-COVERED ITEM OR SERVICE: HCPCS | Performed by: STUDENT IN AN ORGANIZED HEALTH CARE EDUCATION/TRAINING PROGRAM

## 2020-06-30 PROCEDURE — 700102 HCHG RX REV CODE 250 W/ 637 OVERRIDE(OP): Performed by: INTERNAL MEDICINE

## 2020-06-30 PROCEDURE — 99024 POSTOP FOLLOW-UP VISIT: CPT | Performed by: THORACIC SURGERY (CARDIOTHORACIC VASCULAR SURGERY)

## 2020-06-30 PROCEDURE — 82728 ASSAY OF FERRITIN: CPT

## 2020-06-30 PROCEDURE — 85046 RETICYTE/HGB CONCENTRATE: CPT

## 2020-06-30 PROCEDURE — A9270 NON-COVERED ITEM OR SERVICE: HCPCS | Performed by: PHYSICIAN ASSISTANT

## 2020-06-30 PROCEDURE — 700102 HCHG RX REV CODE 250 W/ 637 OVERRIDE(OP): Performed by: STUDENT IN AN ORGANIZED HEALTH CARE EDUCATION/TRAINING PROGRAM

## 2020-06-30 PROCEDURE — 700111 HCHG RX REV CODE 636 W/ 250 OVERRIDE (IP): Performed by: PHYSICIAN ASSISTANT

## 2020-06-30 PROCEDURE — 99232 SBSQ HOSP IP/OBS MODERATE 35: CPT | Mod: GC | Performed by: INTERNAL MEDICINE

## 2020-06-30 PROCEDURE — 700102 HCHG RX REV CODE 250 W/ 637 OVERRIDE(OP): Performed by: PHYSICIAN ASSISTANT

## 2020-06-30 PROCEDURE — A9270 NON-COVERED ITEM OR SERVICE: HCPCS | Performed by: NURSE PRACTITIONER

## 2020-06-30 PROCEDURE — 82270 OCCULT BLOOD FECES: CPT

## 2020-06-30 PROCEDURE — 700111 HCHG RX REV CODE 636 W/ 250 OVERRIDE (IP): Performed by: STUDENT IN AN ORGANIZED HEALTH CARE EDUCATION/TRAINING PROGRAM

## 2020-06-30 PROCEDURE — 83540 ASSAY OF IRON: CPT

## 2020-06-30 PROCEDURE — 700102 HCHG RX REV CODE 250 W/ 637 OVERRIDE(OP): Performed by: NURSE PRACTITIONER

## 2020-06-30 PROCEDURE — 83550 IRON BINDING TEST: CPT

## 2020-06-30 PROCEDURE — C9113 INJ PANTOPRAZOLE SODIUM, VIA: HCPCS | Performed by: STUDENT IN AN ORGANIZED HEALTH CARE EDUCATION/TRAINING PROGRAM

## 2020-06-30 RX ORDER — VALACYCLOVIR HYDROCHLORIDE 500 MG/1
1000 TABLET, FILM COATED ORAL DAILY
Status: DISCONTINUED | OUTPATIENT
Start: 2020-07-01 | End: 2020-07-01 | Stop reason: HOSPADM

## 2020-06-30 RX ORDER — PANTOPRAZOLE SODIUM 40 MG/10ML
40 INJECTION, POWDER, LYOPHILIZED, FOR SOLUTION INTRAVENOUS 2 TIMES DAILY
Status: DISCONTINUED | OUTPATIENT
Start: 2020-06-30 | End: 2020-07-01 | Stop reason: HOSPADM

## 2020-06-30 RX ORDER — PANTOPRAZOLE SODIUM 40 MG/1
40 TABLET, DELAYED RELEASE ORAL 2 TIMES DAILY
Status: DISCONTINUED | OUTPATIENT
Start: 2020-06-30 | End: 2020-06-30

## 2020-06-30 RX ORDER — OXYCODONE HYDROCHLORIDE 10 MG/1
10 TABLET ORAL EVERY 6 HOURS PRN
Status: DISCONTINUED | OUTPATIENT
Start: 2020-06-30 | End: 2020-07-01

## 2020-06-30 RX ORDER — OMEPRAZOLE 20 MG/1
20 CAPSULE, DELAYED RELEASE ORAL 2 TIMES DAILY
Status: DISCONTINUED | OUTPATIENT
Start: 2020-06-30 | End: 2020-06-30

## 2020-06-30 RX ADMIN — METOPROLOL TARTRATE 25 MG: 25 TABLET, FILM COATED ORAL at 09:43

## 2020-06-30 RX ADMIN — ASPIRIN 81 MG: 81 TABLET, COATED ORAL at 09:41

## 2020-06-30 RX ADMIN — SUCRALFATE 1 G: 1 TABLET ORAL at 00:54

## 2020-06-30 RX ADMIN — ALPRAZOLAM 1 MG: 0.5 TABLET ORAL at 20:15

## 2020-06-30 RX ADMIN — GABAPENTIN 300 MG: 300 CAPSULE ORAL at 18:06

## 2020-06-30 RX ADMIN — SUCRALFATE 1 G: 1 TABLET ORAL at 13:05

## 2020-06-30 RX ADMIN — FUROSEMIDE 20 MG: 20 TABLET ORAL at 09:41

## 2020-06-30 RX ADMIN — MUPIROCIN 1 APPLICATION: 20 OINTMENT TOPICAL at 09:43

## 2020-06-30 RX ADMIN — METFORMIN HYDROCHLORIDE 500 MG: 500 TABLET ORAL at 18:07

## 2020-06-30 RX ADMIN — CLOPIDOGREL BISULFATE 75 MG: 75 TABLET ORAL at 09:41

## 2020-06-30 RX ADMIN — FINASTERIDE 5 MG: 5 TABLET, FILM COATED ORAL at 09:42

## 2020-06-30 RX ADMIN — SUCRALFATE 1 G: 1 TABLET ORAL at 18:06

## 2020-06-30 RX ADMIN — VALACYCLOVIR HYDROCHLORIDE 1000 MG: 500 TABLET, FILM COATED ORAL at 09:41

## 2020-06-30 RX ADMIN — CARBIDOPA AND LEVODOPA 1 TABLET: 25; 100 TABLET ORAL at 13:06

## 2020-06-30 RX ADMIN — ALPRAZOLAM 1 MG: 0.5 TABLET ORAL at 00:54

## 2020-06-30 RX ADMIN — METFORMIN HYDROCHLORIDE 500 MG: 500 TABLET ORAL at 09:43

## 2020-06-30 RX ADMIN — SUCRALFATE 1 G: 1 TABLET ORAL at 09:42

## 2020-06-30 RX ADMIN — METOPROLOL TARTRATE 25 MG: 25 TABLET, FILM COATED ORAL at 18:07

## 2020-06-30 RX ADMIN — GABAPENTIN 300 MG: 300 CAPSULE ORAL at 09:42

## 2020-06-30 RX ADMIN — CARBIDOPA AND LEVODOPA 1 TABLET: 25; 100 TABLET ORAL at 09:43

## 2020-06-30 RX ADMIN — ATORVASTATIN CALCIUM 80 MG: 80 TABLET, FILM COATED ORAL at 18:07

## 2020-06-30 RX ADMIN — PANTOPRAZOLE SODIUM 40 MG: 40 INJECTION, POWDER, LYOPHILIZED, FOR SOLUTION INTRAVENOUS at 18:07

## 2020-06-30 RX ADMIN — PANTOPRAZOLE SODIUM 40 MG: 40 INJECTION, POWDER, LYOPHILIZED, FOR SOLUTION INTRAVENOUS at 09:41

## 2020-06-30 RX ADMIN — CARBIDOPA AND LEVODOPA 1 TABLET: 25; 100 TABLET ORAL at 18:06

## 2020-06-30 RX ADMIN — ACETAMINOPHEN 1000 MG: 500 TABLET ORAL at 00:54

## 2020-06-30 RX ADMIN — TAMSULOSIN HYDROCHLORIDE 0.4 MG: 0.4 CAPSULE ORAL at 09:42

## 2020-06-30 RX ADMIN — PAROXETINE HYDROCHLORIDE 40 MG: 20 TABLET, FILM COATED ORAL at 09:42

## 2020-06-30 ASSESSMENT — COGNITIVE AND FUNCTIONAL STATUS - GENERAL
DAILY ACTIVITIY SCORE: 24
SUGGESTED CMS G CODE MODIFIER DAILY ACTIVITY: CH

## 2020-06-30 ASSESSMENT — ENCOUNTER SYMPTOMS
EYES NEGATIVE: 1
PALPITATIONS: 0
GASTROINTESTINAL NEGATIVE: 1
ABDOMINAL PAIN: 0
COUGH: 0
PSYCHIATRIC NEGATIVE: 1
NAUSEA: 0
HEADACHES: 0
CARDIOVASCULAR NEGATIVE: 1
DEPRESSION: 0
NEUROLOGICAL NEGATIVE: 1
FOCAL WEAKNESS: 0
DOUBLE VISION: 0
SHORTNESS OF BREATH: 0
FEVER: 0
MUSCULOSKELETAL NEGATIVE: 1
CHILLS: 0
DIARRHEA: 0
BACK PAIN: 0
ORTHOPNEA: 0
WHEEZING: 0
VOMITING: 0
DIZZINESS: 0
RESPIRATORY NEGATIVE: 1

## 2020-06-30 ASSESSMENT — FIBROSIS 4 INDEX: FIB4 SCORE: 1.79

## 2020-06-30 ASSESSMENT — ACTIVITIES OF DAILY LIVING (ADL): TOILETING: INDEPENDENT

## 2020-06-30 NOTE — PROGRESS NOTES
Assumed care of patient, bedside report received from TANIA Rizvi. Updated on POC, call light within reach and fall precautions in place. Bed locked and in lowest position. Patient instructed to call for assistance before getting out of bed. All questions answered, no other needs at this time.

## 2020-06-30 NOTE — ASSESSMENT & PLAN NOTE
First noticed on 6/29  Hb stable, no further melenic stools  Follow up CBC in one week with PCP  Oral iron supplements prescribed to take after discharge  Patient will follow up with Gastroenterologist after he sees his PCP when he returns home

## 2020-06-30 NOTE — THERAPY
"Occupational Therapy   Initial Evaluation     Patient Name: Ricky Spangler  Age:  70 y.o., Sex:  male  Medical Record #: [id number]  Today's Date: 6/30/2020     Precautions  Precautions: Cardiac Precautions (See Comments), Sternal Precautions (See Comments)  Comments: Handout provided     Assessment  Patient is 70 y.o. male seen for occupational therapy evaluation following OHS. Pt reports having great support at home as well as all the equipment he needs. Pt with no further skilled OT needs in the acute care setting at this time.       Plan    Recommend Occupational Therapy for Evaluation only     Discharge recommendations:  Anticipate that the patient will have no further occupational therapy needs after discharge from the hospital.     Subjective    \"Oh I've read through the handout many times already.'     Objective       06/30/20 1615   Prior Living Situation   Prior Services Home-Independent   Housing / Facility 1 Story House   Steps Into Home 4   Bathroom Set up Walk In Shower;Shower Chair   Equipment Owned Front-Wheel Walker;Single Point Cane;Tub / Shower Seat   Lives with - Patient's Self Care Capacity Spouse;Adult Children   Comments pt reports that his wife and son can assist as needed    Prior Level of ADL Function   Self Feeding Independent   Grooming / Hygiene Independent   Bathing Independent   Dressing Independent   Toileting Independent   Prior Level of IADL Function   Medication Management Independent   Laundry Independent   Kitchen Mobility Independent   Finances Independent   Home Management Independent   Shopping Independent   Prior Level Of Mobility Independent Without Device in Community   Driving / Transportation Driving Independent   Bed Mobility    Supine to Sit Supervised   Sit to Supine Supervised   Scooting Supervised   ADL Assessment   Grooming Supervision;Standing   Bathing Supervision  (seated shower)   Upper Body Dressing Supervision  (gown )   Lower Body Dressing Supervision  (spvn " for socks, maxA for DELIA Hose )   Toileting Supervision   Comments Pt reports his son can help him don his delia hose at home    Functional Mobility   Sit to Stand Supervised   Toilet Transfers Supervised   Tub / Shower Transfers Supervised   Mobility walked in room with supervision, no AD, no LOB noted    Anticipated Discharge Equipment   DC Equipment None

## 2020-06-30 NOTE — PROGRESS NOTES
Daily Progress Note:     Date of Service: 6/30/2020  Primary Team: [facility] Blue Team   Attending: Dr Mily M.D.   Senior Resident: Dr. Campbell  Intern: Dr. Suarez  Contact:  [phone]    Chief Complaint:   Chest pain    ID: 69-year-old male transferred from outside hospital for further management of NSTEMI.  Receiving heparin drip.  Cardiac cath remarkable for multivessel disease and reduced EF 45%.  Patient underwent CABG on 6/25/2020    Subjective:   No overnight events  Patient in NAD, happy  No hematochezia or melena today  Hb trending down slowly  Continue PPI    Dispo: Pending cardiothoracic clearance and following Hb    Consultants/Specialty:  Cardiology  Cardio Thoracic surgery    Review of Systems:    Review of Systems   Constitutional: Negative for chills and fever.   HENT: Negative for ear pain.    Eyes: Negative for double vision.   Respiratory: Negative for cough, shortness of breath and wheezing.    Cardiovascular: Negative for chest pain, palpitations, orthopnea and leg swelling.        Minimal surgical site pain   Gastrointestinal: Negative for abdominal pain, diarrhea, nausea and vomiting.   Genitourinary: Negative for dysuria, frequency and hematuria.   Musculoskeletal: Negative for back pain.   Neurological: Negative for dizziness, focal weakness and headaches.   Psychiatric/Behavioral: Negative for depression.       Objective Data:   Physical Exam:   Vitals:   Temp:  [35.9 °C (96.6 °F)-37.2 °C (98.9 °F)] 36.3 °C (97.4 °F)  Pulse:  [62-90] 62  Resp:  [15-18] 16  BP: ()/(49-62) 121/62  SpO2:  [91 %-94 %] 93 %    Physical Exam  Constitutional:       General: He is not in acute distress.  HENT:      Head: Normocephalic and atraumatic.      Nose: No rhinorrhea.      Mouth/Throat:      Mouth: Mucous membranes are moist.   Eyes:      Extraocular Movements: Extraocular movements intact.      Pupils: Pupils are equal, round, and reactive to light.   Neck:      Musculoskeletal: Normal range of  motion.   Cardiovascular:      Rate and Rhythm: Normal rate.      Pulses: Normal pulses.      Heart sounds: No murmur.      Comments: Surgical site dressings intact, no signs of leakage  Pulmonary:      Effort: Pulmonary effort is normal. No respiratory distress.      Breath sounds: No wheezing.   Abdominal:      General: Abdomen is flat. There is no distension.      Palpations: Abdomen is soft.      Tenderness: There is no abdominal tenderness.   Musculoskeletal: Normal range of motion.         General: No swelling.   Skin:     General: Skin is warm and dry.   Neurological:      General: No focal deficit present.      Mental Status: He is alert and oriented to person, place, and time. Mental status is at baseline.   Psychiatric:         Mood and Affect: Mood normal.         Behavior: Behavior normal.         Quality Measures  Quality-Core Measures   Reviewed items::  EKG reviewed, Labs reviewed and Medications reviewed  Anderson catheter::  No Anderson  DVT prophylaxis - mechanical:  SCDs  Ulcer Prophylaxis::  Yes      Labs:   Recent Labs     06/29/20  0244 06/29/20  1214 06/30/20  0306   WBC 9.4 9.7 7.6   RBC 3.48* 3.67* 3.34*   HEMOGLOBIN 10.9* 11.5* 10.4*   HEMATOCRIT 33.4* 34.6* 32.1*   MCV 96.0 94.3 96.1   MCH 31.3 31.3 31.1   RDW 48.9 48.5 48.6   PLATELETCT 193 205 223   MPV 9.9 9.6 9.3      Recent Labs     06/28/20  0130 06/29/20  0244   SODIUM 134* 134*   POTASSIUM 5.3 4.7   CHLORIDE 99 97   CO2 29 29   GLUCOSE 134* 119*   BUN 14 13      Recent Labs     06/28/20  0130 06/29/20  0244   CREATININE 1.07 1.09      Lab Results   Component Value Date/Time    PROTHROMBTM 13.3 06/25/2020 03:35 AM    INR 0.98 06/25/2020 03:35 AM         Imaging:   DX-CHEST-2 VIEWS   Final Result      Mild right basilar atelectasis. No focal consolidation or pleural effusions.      DX-CHEST-PORTABLE (1 VIEW)   Final Result      Minimal stable left basilar atelectasis. Unremarkable chest appearance otherwise.      EC-BRODY W/O CONT          DX-CHEST-PORTABLE (1 VIEW)   Final Result      1.  Perihilar interstitial opacities likely represent atelectasis. There may be a component of interstitial edema.      2.  Endotracheal tube tip projects approximately 1.8 cm above the gavino.      3.  Right internal jugular catheter tip projects over the cavoatrial junction. No pneumothorax is identified.      CT-CHEST (THORAX) W/O   Final Result         1. Opacity seen on the recent radiograph represents bony proliferation at the right first costosternal junction. No suspicious pulmonary nodules or masses.   2. Atherosclerosis and coronary calcifications.   3. Colonic diverticulosis.      US-CAROTID DOPPLER BILAT   Final Result      US-VEIN MAPPING LOWER EXTREMITY BILAT   Final Result      DX-CHEST-PORTABLE (1 VIEW)   Final Result      Focal opacity projecting over the first costochondral junction on the right. This may be related to asymmetric prominence of the costochondral junction but an underlying mass is not excluded. Further evaluation can be performed with CT chest.         EC-ECHOCARDIOGRAM COMPLETE W/ CONT   Final Result      OUTSIDE IMAGES-CT ABDOMEN /PELVIS   Final Result      OUTSIDE IMAGES-DX CHEST   Final Result      CL-LEFT HEART CATHETERIZATION WITH POSSIBLE INTERVENTION    (Results Pending)        Melena  Assessment & Plan  First noticed on 6/29  Lase melena yesterday   Hb trending down slowly  No melena today  DC DVT prophylaxis  Continue Dual ant-platelet therapy for CABG  Iron studies with low iron, nl ferritin and Fe/TIBC at 9%  May benefit from PO Iron every other day with OJ  Labs on AM        NSTEMI (non-ST elevated myocardial infarction) (HCC)  Assessment & Plan  Patient admitted with NSTEMI.  Cardiac cath [6/22/2020] remarkable for multivessel disease, EF 45%.  CABG recommended. S/p CABG on 6/25/2020.  HbA1c 6.5, lipid panel normal.      -Continue ASA, plavix, statin, lasix  -Continue low-dose metoprolol.   -ACEI/ARB with held due to low  SBP    Type 2 diabetes mellitus (HCC)  Assessment & Plan  -Known history of type 2 diabetes mellitus.  On metformin at home.  HbA1c 6.5   -continue metformin  -Maintain euglycemia, blood glucose 140 to 180 mg/dL    Hypertension  Assessment & Plan  -Known history of hypertension.  On losartan at home. Held due to low SBP  -Resume medications when appropriate    Parkinson's disease ([facility])  Assessment & Plan  -Known history of Parkinson's disease.  On Sinemet at home.  -Continue home meds    COPD (chronic obstructive pulmonary disease) ([facility])  Assessment & Plan  -Known history of COPD, on inhalers at home, ran out of medication recently. Not in exacerbation. CXR from outside grossly unremarkable except for right basilar atelectasis.  -RT protocol, oxygen per protocol  -IS      BPH (benign prostatic hyperplasia)  Assessment & Plan  -On finasteride and tamsulosin at home  -Continue home medications    Prostatitis  Assessment & Plan  History of prostatitis, on fifth week of Bactrim at home.  Finished possible 6 weeks course of Bactrim.  Patient not sure about exact duration  -Repeat UA normal    Pancreatitis, chronic ([facility])  Assessment & Plan  -History of chronic pancreatitis.  Recent CT abdomen/pelvis from outside negative for pancreatic mass/malignancy.  No formal diagnosis of pancreatic malignancy.  CA-19-9 normal. On enzyme supplementation at home  -Resume enzyme supplements

## 2020-06-30 NOTE — PROGRESS NOTES
Cardiovascular Surgery Progress Note    Name: Ricky Spangler  MRN: [id number]  : 1950  Admit Date: 2020  6:15 AM  Procedure:  Procedure(s) and Anesthesia Type:     * CABG, WITH ENDOSCOPIC VEIN PROCUREMENT- X3 - General     * ECHOCARDIOGRAM, TRANSESOPHAGEAL - General  4 Day Post-Op    Vitals:  Patient Vitals for the past 8 hrs:   Temp SpO2 O2 Delivery Device O2 (LPM) Pulse Resp BP Weight   20 0846 36.3 °C (97.4 °F) 93 % Silicone Nasal Cannula 0.5 62 16 121/62 --   20 0436 36.2 °C (97.1 °F) 94 % Silicone Nasal Cannula -- 63 15 107/59 76.1 kg (167 lb 12.3 oz)     Temp (24hrs), Av.5 °C (97.7 °F), Min:35.9 °C (96.6 °F), Max:37.2 °C (98.9 °F)      Respiratory:    Respiration: 16, Pulse Oximetry: 93 %     Chest Tube Drains:          Fluids:    Intake/Output Summary (Last 24 hours) at 2020 1120  Last data filed at 2020 0846  Gross per 24 hour   Intake 571 ml   Output 1890 ml   Net -1319 ml     Admit weight: Weight: 82 kg (180 lb 12.4 oz)  Current weight: Weight: 76.1 kg (167 lb 12.3 oz) (20 0436)    Labs:  Recent Labs     20  0244 20  1214 20  0306   WBC 9.4 9.7 7.6   RBC 3.48* 3.67* 3.34*   HEMOGLOBIN 10.9* 11.5* 10.4*   HEMATOCRIT 33.4* 34.6* 32.1*   MCV 96.0 94.3 96.1   MCH 31.3 31.3 31.1   MCHC 32.6* 33.2* 32.4*   RDW 48.9 48.5 48.6   PLATELETCT 193 205 223   MPV 9.9 9.6 9.3         Recent Labs     20  0130 20  0244   SODIUM 134* 134*   POTASSIUM 5.3 4.7   CHLORIDE 99 97   CO2 29 29   GLUCOSE 134* 119*   BUN 14 13   CREATININE 1.07 1.09   CALCIUM 9.0 8.8           Medications:  • pantoprazole  40 mg     • clopidogrel  75 mg     • furosemide  20 mg     • metFORMIN  500 mg     • sucralfate  1 g     • Pharmacy Consult Request  1 Each     • acetaminophen  1,000 mg     • gabapentin  300 mg      Followed by   • [START ON 2020] gabapentin  100 mg     • senna-docusate  2 Tab      And   • polyethylene glycol/lytes  1 Packet      And   • magnesium  hydroxide  30 mL     • metoprolol  25 mg     • aspirin EC  81 mg     • atorvastatin  80 mg     • carbidopa-levodopa  1 Tab     • finasteride  5 mg     • pancrelipase (Lip-Prot-Amyl)  1 Cap     • PARoxetine  40 mg     • tamsulosin  0.4 mg          Ordered Medications:    ASA - Yes    Plavix - Yes    Post-operative Beta Blockers - Yes    Ace Inhibitor - No; contraindicated because of Normal EF    Statin - Yes    Exam:   Review of Systems   Constitutional: Positive for malaise/fatigue.   HENT: Negative.    Eyes: Negative.    Respiratory: Negative.    Cardiovascular: Negative.    Gastrointestinal: Negative.    Genitourinary: Negative.    Musculoskeletal: Negative.    Skin: Negative.    Neurological: Negative.    Endo/Heme/Allergies: Negative.    Psychiatric/Behavioral: Negative.        Physical Exam  Vitals signs and nursing note reviewed.   Constitutional:       Appearance: Normal appearance.   HENT:      Head: Normocephalic and atraumatic.      Nose: Nose normal.      Mouth/Throat:      Mouth: Mucous membranes are moist.      Pharynx: Oropharynx is clear.   Eyes:      Extraocular Movements: Extraocular movements intact.      Conjunctiva/sclera: Conjunctivae normal.      Pupils: Pupils are equal, round, and reactive to light.   Neck:      Musculoskeletal: Normal range of motion and neck supple.   Cardiovascular:      Rate and Rhythm: Normal rate and regular rhythm.      Pulses: Normal pulses.   Pulmonary:      Effort: Pulmonary effort is normal.      Comments: Decreased in the bases bilaterally  Abdominal:      General: Abdomen is flat. Bowel sounds are normal.      Palpations: Abdomen is soft.   Musculoskeletal: Normal range of motion.   Skin:     General: Skin is warm and dry.      Coloration: Skin is not jaundiced.      Findings: No rash.   Neurological:      General: No focal deficit present.      Mental Status: He is alert and oriented to person, place, and time. Mental status is at baseline.   Psychiatric:          Mood and Affect: Mood normal.         Quality Measures:   Quality-Core Measures   Reviewed items::  EKG reviewed, Radiology images reviewed, Labs reviewed and Medications reviewed      Assessment/Plan:  POD #1:  HDS.  Off all gtts.  Moderate chest tube output overnight.  Pain controlled.  Patient nauseated. Improved after BM.  Wounds CDI.  Weight registered as down??  Fluid positive.   Plan:  Keep mediastinal tubes.  Keep victor for strict I/O.  Begin gentle diuresis.  Up and out of bed as tolerated.  POD #2:  HDS.  NSR with RBBB.  Minimal chest tube output overnight.  Heartburn/nausea.  States he does better with Protonix vs prilosec.  Pain controlled.  Diuresing well.  WBC trending downward.  Plan:  D/c mediastinal tubes.  D/c victor.  Switch to protonix.  Continue diuresis.  Up and out of bed as tolerated.  Encourage IS.   POD #3:  HDS.  NSR with RBBB.  Protonix improved heartburn.  Pain well controlled.  WBC continues to trend down.  Patient should be completing Bactrim as of tomorrow (6 week course).  Clarify, but will discontinue on discharge.   Weight below admit.  Fluid balance down.  Plan:  Switch to oral lasix.  Replace K+.  Home oxygen orders/face to face complete. Up and out of bed as tolerated.  Attempt to wean oxygen.  Discussed discharge with family.  Plan for home Tuesday.   POD 4 HDS, SR, Gi Bleed- Rpt HGB - stable- two more BM's not black (per report)- re-check H/H in am- on protonix/carafate, If h/h low tomorrow will consult GI, Oxygen 0.5 lpm- IS/Amb  POD 4  HDS, SR some SVT last night watch, neuro intact, wounds CDI, abdomen S/NT, HH lower this AM, fluid balance negative, wt down.  Plan:  Occult stools x 2 if back positive will get GI consult prior to discharge.  Statin, IS/ambulate. CPM.    Active Hospital Problems    Diagnosis   • Melena [K92.1]     Priority: High   • NSTEMI (non-ST elevated myocardial infarction) (HCC) [I21.4]     Priority: High   • Parkinson's disease (HCC) [G20]      Priority: Medium   • Hypertension [I10]     Priority: Medium   • Type 2 diabetes mellitus (HCC) [E11.9]     Priority: Medium   • Pancreatitis, chronic (HCC) [K86.1]     Priority: Low   • Prostatitis [N41.9]     Priority: Low   • BPH (benign prostatic hyperplasia) [N40.0]     Priority: Low   • COPD (chronic obstructive pulmonary disease) (HCC) [J44.9]     Priority: Low

## 2020-06-30 NOTE — CARE PLAN
Problem: Communication  Goal: The ability to communicate needs accurately and effectively will improve  Outcome: PROGRESSING AS EXPECTED  Intervention: [place] patient and significant other/support system to call light to alert staff of needs  Flowsheets (Taken 6/28/2020 0111 by Mini Barone RADRIAN)  Oriented to:: All of the Following : Location of Bathroom, Visiting Policy, Unit Routine, Call Light and Bedside Controls, Bedside Rail Policy, Smoking Policy, Rights and Responsibilities, Bedside Report, and Patient Education Notebook  Note: Pt educated on POC and medications. Pt verbalized understanding.      Problem: Safety  Goal: Will remain free from injury  Outcome: PROGRESSING AS EXPECTED  Intervention: Provide assistance with mobility  Flowsheets (Taken 6/29/2020 1900)  Assistance: Standby Assist  Ambulation Tolerance: Tolerates Well  Note: Pt bedside table and call-light are within reach, bed in lowest position and locked. Treaded socks on and pt has been educated on call light use and asked to call before getting up.

## 2020-06-30 NOTE — PROGRESS NOTES
Assumed care of pt, beside report received from TANIA Bergeron. Updated on POC, call light within reach all fall precautions within place. Bed locked and lowered. Pt instructed to call for assistance before getting up. All questions answered, no other needs at this time.

## 2020-06-30 NOTE — PROGRESS NOTES
Cardiac Surgery RN Navigator Daily Rounding Note  Procedure Performed: Procedure(s):  CABG, WITH ENDOSCOPIC VEIN PROCUREMENT- X3  ECHOCARDIOGRAM, TRANSESOPHAGEAL     By  Dr. Linda  5 Days Post-Op    Pertinent Overnight Events:    H&H lower this morning    Small burst of PSVT overnight, non repeating.    Pertinent neuro findings/needs: A&O-not needing pain medication.    Cardiac/hemodynamics:  Temp (24hrs), Av.6 °C (97.8 °F), Min:35.9 °C (96.6 °F), Max:37.2 °C (98.9 °F)    Heart rhythm: NSR  Temp:  [35.9 °C (96.6 °F)-37.2 °C (98.9 °F)] 36.2 °C (97.1 °F)  Pulse:  [63-90] 63  Resp:  [15-18] 15  BP: ()/(49-59) 107/59  SpO2:  [91 %-94 %] 94 %      IV gtts:      12-hour chest tube output: n/a    /Weights:  Admit weight: Weight: 82 kg (180 lb 12.4 oz)  Current Weight: Weight: 76.1 kg (167 lb 12.3 oz) (20 0436)  Weight change: -0.2 kg (-7.1 oz)    Intake/Output Summary (Last 24 hours) at 2020 0809  Last data filed at 2020 0600  Gross per 24 hour   Intake 571 ml   Output 1400 ml   Net -829 ml       Adequate urine output: 400 cc    Respiratory:      Pertinent respiratory findings/needs: .5 liters NC.  IS is doing well 2400    GI findings/needs: + BM     Labs:  Recent Labs     20  0244 20  1214 20  0306   WBC 9.4 9.7 7.6   RBC 3.48* 3.67* 3.34*   HEMOGLOBIN 10.9* 11.5* 10.4*   HEMATOCRIT 33.4* 34.6* 32.1*   MCV 96.0 94.3 96.1   MCH 31.3 31.3 31.1   MCHC 32.6* 33.2* 32.4*   RDW 48.9 48.5 48.6   PLATELETCT 193 205 223   MPV 9.9 9.6 9.3     Recent Labs     20  0130 20  0244   SODIUM 134* 134*   POTASSIUM 5.3 4.7   CHLORIDE 99 97   CO2 29 29   GLUCOSE 134* 119*   BUN 14 13   CREATININE 1.07 1.09   CALCIUM 9.0 8.8     INR:       Required Cardiac medications review:  ASA:  Yes  Plavix: Yes  BB: Yes  ACE/ARB: No; contraindicated because of Normal EF  Statin: yes  Diuretic: yes    LDA's necessity reviewed: YES    Thoughts and considerations for team rounding:    H&H  was lower than yesterday, [facility] team placed order for occult stool X 2.  Nursing reports BM yesterday was brown, no blood.    Discharge plan:    Family is nervous for discharge; wants to make sure he is ready to go home.  Family has O2 set up in [place] already, will bring with them on . They have a 6 hour drive and would appreciate a solid discharge date for coordinating reasons.

## 2020-06-30 NOTE — CARE PLAN
Problem: Communication  Goal: The ability to communicate needs accurately and effectively will improve  Outcome: PROGRESSING AS EXPECTED   Updated on plan of care  Problem: Safety  Goal: Will remain free from injury  Outcome: PROGRESSING AS EXPECTED   Patient to call before getting OOB  Problem: Knowledge Deficit  Goal: Knowledge of disease process/condition, treatment plan, diagnostic tests, and medications will improve  Outcome: PROGRESSING AS EXPECTED   All questions answered

## 2020-07-01 VITALS
OXYGEN SATURATION: 98 % | SYSTOLIC BLOOD PRESSURE: 98 MMHG | DIASTOLIC BLOOD PRESSURE: 54 MMHG | WEIGHT: 166.45 LBS | TEMPERATURE: 98.3 F | RESPIRATION RATE: 18 BRPM | HEART RATE: 65 BPM | BODY MASS INDEX: 26.12 KG/M2 | HEIGHT: 67 IN

## 2020-07-01 PROBLEM — K92.2 LOWER GI BLEEDING: Status: ACTIVE | Noted: 2020-07-01

## 2020-07-01 PROBLEM — J96.01 ACUTE RESPIRATORY FAILURE WITH HYPOXIA (HCC): Status: ACTIVE | Noted: 2020-07-01

## 2020-07-01 PROBLEM — N40.0 BPH (BENIGN PROSTATIC HYPERPLASIA): Status: RESOLVED | Noted: 2020-06-20 | Resolved: 2020-07-01

## 2020-07-01 PROBLEM — J96.01 ACUTE RESPIRATORY FAILURE WITH HYPOXIA (HCC): Status: RESOLVED | Noted: 2020-07-01 | Resolved: 2020-07-01

## 2020-07-01 PROBLEM — I10 HYPERTENSION: Status: RESOLVED | Noted: 2020-06-20 | Resolved: 2020-07-01

## 2020-07-01 PROBLEM — K92.2 LOWER GI BLEEDING: Status: RESOLVED | Noted: 2020-07-01 | Resolved: 2020-07-01

## 2020-07-01 PROBLEM — K92.1 MELENA: Status: RESOLVED | Noted: 2020-06-30 | Resolved: 2020-07-01

## 2020-07-01 PROBLEM — K86.1 PANCREATITIS, CHRONIC (HCC): Status: RESOLVED | Noted: 2020-06-20 | Resolved: 2020-07-01

## 2020-07-01 PROBLEM — N41.9 PROSTATITIS: Status: RESOLVED | Noted: 2020-06-20 | Resolved: 2020-07-01

## 2020-07-01 PROBLEM — G20.A1 PARKINSON'S DISEASE (HCC): Status: RESOLVED | Noted: 2020-06-20 | Resolved: 2020-07-01

## 2020-07-01 LAB
ANION GAP SERPL CALC-SCNC: 12 MMOL/L (ref 7–16)
BASOPHILS # BLD AUTO: 0.9 % (ref 0–1.8)
BASOPHILS # BLD: 0.07 K/UL (ref 0–0.12)
BUN SERPL-MCNC: 17 MG/DL (ref 8–22)
CALCIUM SERPL-MCNC: 9.1 MG/DL (ref 8.5–10.5)
CHLORIDE SERPL-SCNC: 98 MMOL/L (ref 96–112)
CO2 SERPL-SCNC: 26 MMOL/L (ref 20–33)
CREAT SERPL-MCNC: 1.16 MG/DL (ref 0.5–1.4)
EOSINOPHIL # BLD AUTO: 0.3 K/UL (ref 0–0.51)
EOSINOPHIL NFR BLD: 3.9 % (ref 0–6.9)
ERYTHROCYTE [DISTWIDTH] IN BLOOD BY AUTOMATED COUNT: 50.4 FL (ref 35.9–50)
GLUCOSE SERPL-MCNC: 110 MG/DL (ref 65–99)
HCT VFR BLD AUTO: 34.5 % (ref 42–52)
HGB BLD-MCNC: 11.1 G/DL (ref 14–18)
IMM GRANULOCYTES # BLD AUTO: 0.08 K/UL (ref 0–0.11)
IMM GRANULOCYTES NFR BLD AUTO: 1 % (ref 0–0.9)
LYMPHOCYTES # BLD AUTO: 1.58 K/UL (ref 1–4.8)
LYMPHOCYTES NFR BLD: 20.7 % (ref 22–41)
MCH RBC QN AUTO: 31.4 PG (ref 27–33)
MCHC RBC AUTO-ENTMCNC: 32.2 G/DL (ref 33.7–35.3)
MCV RBC AUTO: 97.5 FL (ref 81.4–97.8)
MONOCYTES # BLD AUTO: 0.79 K/UL (ref 0–0.85)
MONOCYTES NFR BLD AUTO: 10.4 % (ref 0–13.4)
NEUTROPHILS # BLD AUTO: 4.8 K/UL (ref 1.82–7.42)
NEUTROPHILS NFR BLD: 63.1 % (ref 44–72)
NRBC # BLD AUTO: 0 K/UL
NRBC BLD-RTO: 0 /100 WBC
PLATELET # BLD AUTO: 274 K/UL (ref 164–446)
PMV BLD AUTO: 9.2 FL (ref 9–12.9)
POTASSIUM SERPL-SCNC: 4.4 MMOL/L (ref 3.6–5.5)
RBC # BLD AUTO: 3.54 M/UL (ref 4.7–6.1)
SODIUM SERPL-SCNC: 136 MMOL/L (ref 135–145)
WBC # BLD AUTO: 7.6 K/UL (ref 4.8–10.8)

## 2020-07-01 PROCEDURE — A9270 NON-COVERED ITEM OR SERVICE: HCPCS | Performed by: PHYSICIAN ASSISTANT

## 2020-07-01 PROCEDURE — 700102 HCHG RX REV CODE 250 W/ 637 OVERRIDE(OP): Performed by: NURSE PRACTITIONER

## 2020-07-01 PROCEDURE — A9270 NON-COVERED ITEM OR SERVICE: HCPCS | Performed by: STUDENT IN AN ORGANIZED HEALTH CARE EDUCATION/TRAINING PROGRAM

## 2020-07-01 PROCEDURE — C9113 INJ PANTOPRAZOLE SODIUM, VIA: HCPCS | Performed by: STUDENT IN AN ORGANIZED HEALTH CARE EDUCATION/TRAINING PROGRAM

## 2020-07-01 PROCEDURE — A9270 NON-COVERED ITEM OR SERVICE: HCPCS | Performed by: INTERNAL MEDICINE

## 2020-07-01 PROCEDURE — 85025 COMPLETE CBC W/AUTO DIFF WBC: CPT

## 2020-07-01 PROCEDURE — 36415 COLL VENOUS BLD VENIPUNCTURE: CPT

## 2020-07-01 PROCEDURE — 99024 POSTOP FOLLOW-UP VISIT: CPT | Performed by: THORACIC SURGERY (CARDIOTHORACIC VASCULAR SURGERY)

## 2020-07-01 PROCEDURE — 700111 HCHG RX REV CODE 636 W/ 250 OVERRIDE (IP): Performed by: STUDENT IN AN ORGANIZED HEALTH CARE EDUCATION/TRAINING PROGRAM

## 2020-07-01 PROCEDURE — A9270 NON-COVERED ITEM OR SERVICE: HCPCS | Performed by: NURSE PRACTITIONER

## 2020-07-01 PROCEDURE — 80048 BASIC METABOLIC PNL TOTAL CA: CPT

## 2020-07-01 PROCEDURE — 700102 HCHG RX REV CODE 250 W/ 637 OVERRIDE(OP): Performed by: STUDENT IN AN ORGANIZED HEALTH CARE EDUCATION/TRAINING PROGRAM

## 2020-07-01 PROCEDURE — 700102 HCHG RX REV CODE 250 W/ 637 OVERRIDE(OP): Performed by: INTERNAL MEDICINE

## 2020-07-01 PROCEDURE — 99239 HOSP IP/OBS DSCHRG MGMT >30: CPT | Mod: GC | Performed by: INTERNAL MEDICINE

## 2020-07-01 PROCEDURE — 700102 HCHG RX REV CODE 250 W/ 637 OVERRIDE(OP): Performed by: PHYSICIAN ASSISTANT

## 2020-07-01 RX ORDER — SUCRALFATE 1 G/1
1 TABLET ORAL EVERY 6 HOURS
Qty: 40 TAB | Refills: 1 | Status: SHIPPED | OUTPATIENT
Start: 2020-07-01 | End: 2020-07-31

## 2020-07-01 RX ORDER — TRAMADOL HYDROCHLORIDE 50 MG/1
50 TABLET ORAL EVERY 8 HOURS PRN
Qty: 21 TAB | Refills: 0 | Status: SHIPPED | OUTPATIENT
Start: 2020-07-01 | End: 2020-07-08

## 2020-07-01 RX ORDER — SIMETHICONE 80 MG
80 TABLET,CHEWABLE ORAL 3 TIMES DAILY PRN
Qty: 90 TAB | Refills: 3 | Status: SHIPPED | OUTPATIENT
Start: 2020-07-01

## 2020-07-01 RX ORDER — FERROUS SULFATE 325(65) MG
325 TABLET ORAL
Qty: 90 TAB | Refills: 1 | Status: SHIPPED | OUTPATIENT
Start: 2020-07-01

## 2020-07-01 RX ORDER — CLOPIDOGREL BISULFATE 75 MG/1
75 TABLET ORAL DAILY
Qty: 30 TAB | Refills: 3 | Status: SHIPPED | OUTPATIENT
Start: 2020-07-01 | End: 2020-07-01 | Stop reason: SDUPTHER

## 2020-07-01 RX ORDER — FUROSEMIDE 20 MG/1
20 TABLET ORAL DAILY
Qty: 90 TAB | Refills: 1 | Status: SHIPPED | OUTPATIENT
Start: 2020-07-01 | End: 2020-09-14

## 2020-07-01 RX ORDER — ATORVASTATIN CALCIUM 80 MG/1
80 TABLET, FILM COATED ORAL EVERY EVENING
Qty: 30 TAB | Refills: 3 | Status: SHIPPED | OUTPATIENT
Start: 2020-07-01 | End: 2020-07-01 | Stop reason: SDUPTHER

## 2020-07-01 RX ORDER — CLOPIDOGREL BISULFATE 75 MG/1
75 TABLET ORAL DAILY
Qty: 90 TAB | Refills: 1 | Status: SHIPPED | OUTPATIENT
Start: 2020-07-01

## 2020-07-01 RX ORDER — ATORVASTATIN CALCIUM 80 MG/1
80 TABLET, FILM COATED ORAL EVERY EVENING
Qty: 90 TAB | Refills: 1 | Status: SHIPPED | OUTPATIENT
Start: 2020-07-01

## 2020-07-01 RX ORDER — OMEPRAZOLE 40 MG/1
40 CAPSULE, DELAYED RELEASE ORAL DAILY
Qty: 90 CAP | Refills: 1 | Status: SHIPPED | OUTPATIENT
Start: 2020-07-01 | End: 2020-07-08

## 2020-07-01 RX ORDER — ACETAMINOPHEN 325 MG/1
650 TABLET ORAL EVERY 6 HOURS PRN
Qty: 30 TAB | Refills: 1 | Status: SHIPPED | OUTPATIENT
Start: 2020-07-01 | End: 2020-07-08

## 2020-07-01 RX ORDER — OMEPRAZOLE 40 MG/1
40 CAPSULE, DELAYED RELEASE ORAL DAILY
Qty: 30 CAP | Refills: 1 | Status: SHIPPED | OUTPATIENT
Start: 2020-07-01 | End: 2020-07-01 | Stop reason: SDUPTHER

## 2020-07-01 RX ORDER — ASPIRIN 81 MG/1
81 TABLET ORAL DAILY
Qty: 90 TAB | Refills: 1 | Status: SHIPPED | OUTPATIENT
Start: 2020-07-01

## 2020-07-01 RX ORDER — FUROSEMIDE 20 MG/1
20 TABLET ORAL DAILY
Qty: 60 TAB | Refills: 1 | Status: SHIPPED | OUTPATIENT
Start: 2020-07-01 | End: 2020-07-01 | Stop reason: SDUPTHER

## 2020-07-01 RX ORDER — FERROUS SULFATE 325(65) MG
325 TABLET ORAL
Status: DISCONTINUED | OUTPATIENT
Start: 2020-07-02 | End: 2020-07-01 | Stop reason: HOSPADM

## 2020-07-01 RX ORDER — SUCRALFATE 1 G/1
1 TABLET ORAL EVERY 6 HOURS
Qty: 40 TAB | Refills: 1 | Status: SHIPPED | OUTPATIENT
Start: 2020-07-01 | End: 2020-07-01 | Stop reason: SDUPTHER

## 2020-07-01 RX ORDER — ASPIRIN 81 MG/1
81 TABLET ORAL DAILY
Qty: 30 TAB | COMMUNITY
Start: 2020-07-01 | End: 2020-07-01 | Stop reason: SDUPTHER

## 2020-07-01 RX ORDER — SIMETHICONE 80 MG
80 TABLET,CHEWABLE ORAL 3 TIMES DAILY PRN
Qty: 90 TAB | Refills: 3 | Status: SHIPPED | OUTPATIENT
Start: 2020-07-01 | End: 2020-07-01 | Stop reason: SDUPTHER

## 2020-07-01 RX ORDER — TRAMADOL HYDROCHLORIDE 50 MG/1
50 TABLET ORAL EVERY 6 HOURS PRN
Qty: 56 TAB | Refills: 0 | Status: SHIPPED | OUTPATIENT
Start: 2020-07-01 | End: 2020-07-01 | Stop reason: SDUPTHER

## 2020-07-01 RX ORDER — FERROUS SULFATE 325(65) MG
325 TABLET ORAL
Status: DISCONTINUED | OUTPATIENT
Start: 2020-07-01 | End: 2020-07-01

## 2020-07-01 RX ADMIN — TAMSULOSIN HYDROCHLORIDE 0.4 MG: 0.4 CAPSULE ORAL at 09:33

## 2020-07-01 RX ADMIN — FINASTERIDE 5 MG: 5 TABLET, FILM COATED ORAL at 09:35

## 2020-07-01 RX ADMIN — METFORMIN HYDROCHLORIDE 500 MG: 500 TABLET ORAL at 09:33

## 2020-07-01 RX ADMIN — GABAPENTIN 100 MG: 100 CAPSULE ORAL at 09:34

## 2020-07-01 RX ADMIN — CARBIDOPA AND LEVODOPA 1 TABLET: 25; 100 TABLET ORAL at 09:35

## 2020-07-01 RX ADMIN — FUROSEMIDE 20 MG: 20 TABLET ORAL at 09:39

## 2020-07-01 RX ADMIN — PAROXETINE HYDROCHLORIDE 40 MG: 20 TABLET, FILM COATED ORAL at 09:43

## 2020-07-01 RX ADMIN — METOPROLOL TARTRATE 25 MG: 25 TABLET, FILM COATED ORAL at 09:39

## 2020-07-01 RX ADMIN — SUCRALFATE 1 G: 1 TABLET ORAL at 00:28

## 2020-07-01 RX ADMIN — CARBIDOPA AND LEVODOPA 1 TABLET: 25; 100 TABLET ORAL at 14:20

## 2020-07-01 RX ADMIN — SUCRALFATE 1 G: 1 TABLET ORAL at 09:34

## 2020-07-01 RX ADMIN — CLOPIDOGREL BISULFATE 75 MG: 75 TABLET ORAL at 09:35

## 2020-07-01 RX ADMIN — VALACYCLOVIR HYDROCHLORIDE 1000 MG: 500 TABLET, FILM COATED ORAL at 09:43

## 2020-07-01 RX ADMIN — ASPIRIN 81 MG: 81 TABLET, COATED ORAL at 09:34

## 2020-07-01 RX ADMIN — PANTOPRAZOLE SODIUM 40 MG: 40 INJECTION, POWDER, LYOPHILIZED, FOR SOLUTION INTRAVENOUS at 09:38

## 2020-07-01 RX ADMIN — SUCRALFATE 1 G: 1 TABLET ORAL at 14:20

## 2020-07-01 ASSESSMENT — FIBROSIS 4 INDEX: FIB4 SCORE: 1.45

## 2020-07-01 ASSESSMENT — ENCOUNTER SYMPTOMS
PALPITATIONS: 0
WHEEZING: 0
MYALGIAS: 0
DIZZINESS: 0
HEMOPTYSIS: 0
DEPRESSION: 0
COUGH: 0
BLOOD IN STOOL: 0
CHILLS: 0
FEVER: 0

## 2020-07-01 NOTE — ASSESSMENT & PLAN NOTE
- new onset hypoxia post CABG, is being discharged with 0.5 LPM O2 support, to be followed for continued need as outpatient  - chronic COPD

## 2020-07-01 NOTE — DISCHARGE SUMMARY
DISCHARGE SUMMARY    ADMISSION DATE: 6/20/2020    DISCHARGE DATE: 7/1/2020    CHIEF COMPLAINT ON ADMISSION:   Chief Complaint   Patient presents with   • Chest Pain     Transfer from [facility] for NSTEMI, heparin drip started before transport, pt denies CP currently. HR 57 /58. Pt recently admitted for pancreatitis        ADMITTING DIAGNOSES: Non-STEMI, mi multivessel coronary disease, Parkinson's, hypertension, diabetes mellitus, chronic pancreatitis, prostatitis, COPD    DISCHARGE DIAGNOSES: Non-STEMI, mi multivessel coronary disease, Parkinson's, hypertension, diabetes mellitus, chronic pancreatitis, prostatitis, COPD    PROCEDURES PERFORMED:   CABG, WITH ENDOSCOPIC VEIN PROCUREMENT of right greater saphenous vein- X3 - Wound Class: Clean with Drain  ECHOCARDIOGRAM, TRANSESOPHAGEAL - Wound Class: None    HISTORY OF PRESENT ILLNESS:    The patient is a 70 y.o. male with history significant for HTN, type II DM, COPD, recently diagnosed Parkinson's disease, colon cancer s/p colon resection (10 years prior) and chronic pancreatitis who presented to the hospital near [place], [place] with abdominal pain, nausea and chest pain.  He was flown to [facility] where he was diagnosed with an NSTEMI.  Cardiac cath shows multivessel coronary artery disease and we were asked to consult.  Upon arrival there was confusion of an active pancreatitis case.  Labwork and further work up shows no pancreatitis during this admission.  He is on Bactrim for dysuria. There was also question of a previous desire to be DNI/DNR but after prolonged discussion the patient is adamant that he is full code.  He recently retired.  Has wife and son living at home with him in [place].  Denies alcohol abuse and active tobacco abuse.  Previous tobacco and marijuana use.  It was decided to proceed with urgent CABG.    HOSPITAL COURSE:   POD #1:  HDS.  Off all gtts.  Moderate chest tube output overnight.  Pain controlled.  Patient nauseated.  Improved after BM.  Wounds CDI.  Weight registered as down??  Fluid positive.   Plan:  Keep mediastinal tubes.  Keep victor for strict I/O.  Begin gentle diuresis.  Up and out of bed as tolerated.  POD #2:  HDS.  NSR with RBBB.  Minimal chest tube output overnight.  Heartburn/nausea.  States he does better with Protonix vs prilosec.  Pain controlled.  Diuresing well.  WBC trending downward.  Plan:  D/c mediastinal tubes.  D/c victor.  Switch to protonix.  Continue diuresis.  Up and out of bed as tolerated.  Encourage IS.   POD #3:  HDS.  NSR with RBBB.  Protonix improved heartburn.  Pain well controlled.  WBC continues to trend down.  Patient should be completing Bactrim as of tomorrow (6 week course).  Clarify, but will discontinue on discharge.   Weight below admit.  Fluid balance down.  Plan:  Switch to oral lasix.  Replace K+.  Home oxygen orders/face to face complete. Up and out of bed as tolerated.  Attempt to wean oxygen.  Discussed discharge with family.  Plan for home Tuesday.   POD 4 HDS, SR, Gi Bleed- Rpt HGB - stable- two more BM's not black (per report)- re-check H/H in am- on protonix/carafate, If h/h low tomorrow will consult GI, Oxygen 0.5 lpm- IS/Amb  POD 5  HDS, SR some SVT last night watch, neuro intact, wounds CDI, abdomen S/NT, HH lower this AM, fluid balance negative, wt down.  Plan:  Occult stools x 2 if back positive will get GI consult prior to discharge.  Statin, IS/ambulate. CPM.  POD 6:  HDS.  NSR.  Wounds CDI.  No abdominal pain.  SNT.  Weight down, fluid down.  Occult blood sample shows positive.  H&H improved today. Patient/family anxious for discharge.  Plan: Patient up, walking hallways.  Pain well controlled. Discharge home with family.  Plan for outpatient GI work up in 1 week.  Discussed with family.  Reviewed home medications.  Reviewed sternal precautions.  Discussed watching stool for signs of blood loss as well as symptoms/signs to be aware of.  Patient understands.  Follow ups  made, listed below.        RECENT LABS:     Lab Results   Component Value Date/Time    SODIUM 136 07/01/2020 02:58 AM    POTASSIUM 4.4 07/01/2020 02:58 AM    CHLORIDE 98 07/01/2020 02:58 AM    CO2 26 07/01/2020 02:58 AM    GLUCOSE 110 (H) 07/01/2020 02:58 AM    BUN 17 07/01/2020 02:58 AM    CREATININE 1.16 07/01/2020 02:58 AM      Lab Results   Component Value Date/Time    WBC 7.6 07/01/2020 02:58 AM    RBC 3.54 (L) 07/01/2020 02:58 AM    HEMOGLOBIN 11.1 (L) 07/01/2020 02:58 AM    HEMATOCRIT 34.5 (L) 07/01/2020 02:58 AM    MCV 97.5 07/01/2020 02:58 AM    MCH 31.4 07/01/2020 02:58 AM    MCHC 32.2 (L) 07/01/2020 02:58 AM    MPV 9.2 07/01/2020 02:58 AM    NEUTSPOLYS 63.10 07/01/2020 02:58 AM    LYMPHOCYTES 20.70 (L) 07/01/2020 02:58 AM    MONOCYTES 10.40 07/01/2020 02:58 AM    EOSINOPHILS 3.90 07/01/2020 02:58 AM    BASOPHILS 0.90 07/01/2020 02:58 AM      Lab Results   Component Value Date/Time    PROTHROMBTM 13.3 06/25/2020 03:35 AM    INR 0.98 06/25/2020 03:35 AM        ALLERGIES:     Tetanus toxoid    DISCHARGE MEDICATIONS:      Medication List      START taking these medications      Instructions   aspirin 81 MG EC tablet   Take 1 Tab by mouth every day.  Dose:  81 mg     atorvastatin 80 MG tablet  Commonly known as:  LIPITOR   Take 1 Tab by mouth every evening.  Dose:  80 mg     clopidogrel 75 MG Tabs  Commonly known as:  PLAVIX   Take 1 Tab by mouth every day.  Dose:  75 mg     furosemide 20 MG Tabs  Commonly known as:  LASIX   Take 1 Tab by mouth every day.  Dose:  20 mg     metoprolol 25 MG Tabs  Commonly known as:  LOPRESSOR   Take 1 Tab by mouth 2 Times a Day.  Dose:  25 mg     simethicone 80 MG Chew  Commonly known as:  MYLICON   Take 1 Tab by mouth 3 times a day as needed (Bloating).  Dose:  80 mg     sucralfate 1 GM Tabs  Commonly known as:  CARAFATE   Take 1 Tab by mouth every 6 hours for 30 days.  Dose:  1 g     tramadol 50 MG Tabs  Commonly known as:  ULTRAM   Take 1 Tab by mouth every 6 hours as needed  for up to 14 days.  Dose:  50 mg        CONTINUE taking these medications      Instructions   ALPRAZolam 1 MG Tabs  Commonly known as:  XANAX   Take 1 mg by mouth at bedtime as needed for Sleep.  Dose:  1 mg     carbidopa-levodopa  MG Tabs  Commonly known as:  SINEMET   Take 1 Tab by mouth 3 times a day.  Dose:  1 Tab     finasteride 5 MG Tabs  Commonly known as:  PROSCAR   Take 5 mg by mouth every day.  Dose:  5 mg     losartan 25 MG Tabs  Commonly known as:  COZAAR   Take 25 mg by mouth every day.  Dose:  25 mg     metFORMIN 500 MG Tabs  Commonly known as:  GLUCOPHAGE   Take 500 mg by mouth 2 times a day, with meals.  Dose:  500 mg     pancrelipase (Lip-Prot-Amyl) 6000 units Cpep  Commonly known as:  CREON 6000   Take 1 Cap by mouth 3 times a day, with meals.  Dose:  1 Cap     pantoprazole 40 MG Tbec  Commonly known as:  PROTONIX   Take 40 mg by mouth 2 times a day.  Dose:  40 mg     PARoxetine 30 MG Tabs  Commonly known as:  PAXIL   Take 40 mg by mouth every day.  Dose:  40 mg     tamsulosin 0.4 MG capsule  Commonly known as:  FLOMAX   Take 0.4 mg by mouth every day.  Dose:  0.4 mg     valACYclovir 500 MG Tabs  Commonly known as:  VALTREX   Take 1,000 mg by mouth every day.  Dose:  1,000 mg            NARCOTIC PAIN MEDICATIONS:   In prescribing controlled substances to this patient, I certify that I have obtained and reviewed their medical history. I have also made a good royce effort to obtain applicable records from other providers who have treated the patient .    I have conducted a physical exam and documented it. I have reviewed the patient's prescription history as maintained by the [place] Prescription Monitoring Program.     I have assessed the patient’s risk for abuse, dependency, and addiction using the validated Opioid Risk Tool.    Given the above, I believe the benefits of controlled substance therapy outweigh the risks. The reasons for prescribing controlled substances include non-narcotic,  oral analgesic alternatives have been inadequate for pain control. Accordingly, I have discussed the risk and benefits, treatment plan, and alternative therapies with the patient.     Pt understands this prescription is a controlled substance which is potentially habit-forming and its use is regulated by the [employer]. It must be submitted to the pharmacy within 5 days of the date written and can not be called in or faxed to the pharmacy. Refills are subject to terms of a medicine agreement. Any refill requires a new prescription that must be obtained from this office during regular office hours. We ask for 72 hours notice to get an appointment for a narcotic pain medication refill. This medicine can cause nausea, significant constipation, sedation, confusion.     DIET:   Cardiac diet    DISCHARGE INSTRUCTIONS DISCUSSED WITH THE PATIENT:      1. NO driving for 4 weeks after surgery. You may ride as a passenger.  2. NO lifting of any item over 10 lbs (e.g. gallon of milk) for 6 weeks after surgery.  3. DO walk as much as possible! Walk a minimum of once a day. Depending on your fatigue and comfort level, you may walk as much as you wish. There is no maximum.  4. Other physical activities (sex, housework, gardening, etc.) are OK after 4 weeks   5. Continue using incentive spirometer for 2 weeks, especially if going home on oxygen.    Incision Care:  1. SHOWER ONLY - no baths. Clean incision daily with plain Ivory ® soap or any other dye or perfume free soap. Then pat incision dry with clean towel. Avoid creams or lotions on the incision(s).  a. If there is any increase in redness or swelling, or separation of the incision line, or thick drainage* from any of the incisions, call right away  * Clear, thin drainage is not abnormal especially from the leg incision and/or chest tube sites.  2. Continue to wear your ZITA Stockings for 4 weeks. You may take off the stockings when in bed or when the legs are elevated.    Patient  instructed to call [facility] at [phone]  if any increased shortness of breath, uncontrolled pain, weight gain greater than 2 pounds in 1 day, SBP >140, HR <60 or redness swelling or drainage of incisions.      FOLLOW-UP:   Future Appointments   Date Time Provider Department Center   7/8/2020 10:00 AM Ulises Guillaume M.D. [facility] None   8/3/2020 12:15 PM [facility] None    No follow-up provider specified.

## 2020-07-01 NOTE — CARE PLAN
Problem: Safety  Goal: Will remain free from falls  Outcome: PROGRESSING AS EXPECTED     Problem: Infection  Goal: Will remain free from infection  Outcome: PROGRESSING AS EXPECTED     Problem: Knowledge Deficit  Goal: Knowledge of disease process/condition, treatment plan, diagnostic tests, and medications will improve  Outcome: PROGRESSING AS EXPECTED     Problem: Discharge Barriers/Planning  Goal: Patient's continuum of care needs will be met  Outcome: PROGRESSING AS EXPECTED

## 2020-07-01 NOTE — DISCHARGE INSTRUCTIONS
Discharge Instructions    Discharged to home by car with relative. Discharged via wheelchair, hospital escort: Yes.  Special equipment needed: Oxygen    Be sure to schedule a follow-up appointment with your primary care doctor or any specialists as instructed.     Discharge Plan:        I understand that a diet low in cholesterol, fat, and sodium is recommended for good health. Unless I have been given specific instructions below for another diet, I accept this instruction as my diet prescription.   Other diet: cardiac    Special Instructions: Diagnosis:  Acute Coronary Syndrome (ACS) is a diagnosis that encompasses cardiac-related chest pain and heart attack. ACS occurs when the blood flow to the heart muscle is severely reduced or cut off completely due to a slow process called atherosclerosis.  Atherosclerosis is a disease in which the coronary arteries become narrow from a buildup of fat, cholesterol, and other substances that combine to form plaque. If the plaque breaks, a blood clot will form and block the blood flow to the heart muscle. This lack of blood flow can cause damage or death to the heart muscle which is called a heart attack or Myocardial Infarction (MI). There are two different types of MIs:  ST Elevation Myocardial Infarction or STEMI (the most severe type of heart attack) and Non-ST Elevation Myocardial Infarction or NSTEMI.    Treatment Plan:  · Cardiac Diet  - Low fat, low salt, low cholesterol   · Cardiac Rehab  - Your doctor has ordered you a referral to [facility].  Call [phone] to schedule an appointment.  · Attend my follow-up appointment with my Cardiologist.  · Take my medications as prescribed by my doctor  · Exercise daily  · Lower my bad cholesterol and raise my good cholesterol, lower my blood pressure and Reduce stress    Medications:  Certain medications are used to treat ACS.  Remember to always take medications as prescribed and never stop talking medications unless told by  your doctor.    You have been prescribed the following medicatons:    Aspirin - Aspirin is used as a blood thinning medication and you will require this medication indefinitely.  Anti-platelet/blood thinner - Your Anti-platelet/Blood thinning medication is called PLAVIX, and is used in combination with aspirin to prevent clots from forming in your heart and/or around your stent.  Your doctor will determine how long you need to be on this medicine.  Beta-Blocker - Beta-Blocker METOPROLOL is used to lower blood pressure and heart rate, and/or helps your heart heal after a heart attack.  Statin - Statin ATORVASTATIN is used to lower cholesterol.    · Is patient discharged on Warfarin / Coumadin?   No   DIVISION OF CARDIAC SURGERY DISCHARGE INSTRUCTIONS    Activity:    1. NO driving for 4 weeks after surgery. You may ride as a passenger.  2. NO lifting more than 10 pounds for 6 weeks.  3. For the next 6 weeks, keep your elbows close to your body and move within a pain-free motion when lifting, pushing or pulling.  Do not stretch both arms backwards at the same time.    4. Walk at least 4 times per day, there is no maximum.  5. Other physical activities (sex, housework, gardening, etc.) are okay after 4 weeks.  6. Continue using incentive spirometer for 2 weeks.  If you are going home on oxygen and you were not on oxygen prior to surgery, keep using until you are oxygen free.  7. Weigh yourself daily.  Call your Cardiologist for a weight gain of 2 or more pounds within 48 hours.  8. Take all of your medications as prescribed    Incision Care:    7. SHOWER EVERYDAY-no baths. Make sure to clean your incision(s) twice daily with plain, perfume and dye-free soap.  Then pat incision(s) dry with clean towel. No creams or lotions on your incision(s).    8. If you are discharging with a Prevena bandage on your chest, you or your home health nurse may remove the bandage 7 days after surgery, or sooner if the battery runs out or  the device alarms. When the battery runs out, the bandage will lose suction and it will puff up.  To remove, slowly roll down the edges of the bandage. Throw away the entire bandage and the battery pack.  Keep the incision open to air and clean twice daily with soap and water.  9. If there is any increased redness or swelling, separation of the incision line, or thick drainage from any of your incisions, call the Cardiac Surgeons ([phone]).    10. Continue to wear your ZITA Stockings for 4 weeks. You may take them off when you are in bed or when your legs are elevated.    General Instructions:    2. You have been referred to Cardiac Rehab.  You can start Cardiac Rehab 30 days after surgery.  If you do not have an appointment at the time of discharge call [phone] to schedule an appointment.  3. Your Primary Care Doctor typically handles home oxygen. Oxygen may be stopped when your oxygen level is consistently greater than 90.  Check with your Primary Care Doctor if you are unsure.  4. Take all of your medications (including pain medications) as prescribed.  Taking medications other than prescribed can result in serious injury.    For Patients Discharged with Narcotic Pain Medication:     1. If a refill is needed, understand that only 1 refill will be provided and you must come to the Cardiac Surgeons’ office for an appointment (72 hours’ notice is required to schedule and there are no weekend appointments).  2. If the pain medications you are discharged on are not working, you will need to bring your remaining prescription into the office in order to receive a new prescription.  3. If you were taking narcotics prior to your heart surgery, the Cardiac Surgeons will provide you with one prescription and additional medications will need to be provided by your pain management doctor.  4. Do not drink alcohol while taking narcotics.  5. Lost or stolen medications will not be refilled.  If medications are stolen,  report to law enforcement.    Contact Cardiac Surgery at [phone] if you have any questions.    Depression / Suicide Risk    As you are discharged from this [facility] facility, it is important to learn how to keep safe from harming yourself.    Recognize the warning signs:  · Abrupt changes in personality, positive or negative- including increase in energy   · Giving away possessions  · Change in eating patterns- significant weight changes-  positive or negative  · Change in sleeping patterns- unable to sleep or sleeping all the time   · Unwillingness or inability to communicate  · Depression  · Unusual sadness, discouragement and loneliness  · Talk of wanting to die  · Neglect of personal appearance   · Rebelliousness- reckless behavior  · Withdrawal from people/activities they love  · Confusion- inability to concentrate     If you or a loved one observes any of these behaviors or has concerns about self-harm, here's what you can do:  · Talk about it- your feelings and reasons for harming yourself  · Remove any means that you might use to hurt yourself (examples: pills, rope, extension cords, firearm)  · Get professional help from the community (Mental Health, Substance Abuse, psychological counseling)  · Do not be alone:Call your Safe Contact- someone whom you trust who will be there for you.  · Call your local CRISIS HOTLINE 745-4556 or 501-731-6242  · Call your local Children's Mobile Crisis Response Team [place] (272) 166-9901 or www.[employer]  · Call the toll free National Suicide Prevention Hotlines   · National Suicide Prevention Lifeline 955-022-LERH (2570)  · National Hope Line Network 800-SUICIDE (245-0448)            Gastrointestinal Bleeding  Gastrointestinal (GI) bleeding is bleeding somewhere along the path that food travels through the body (digestive tract). This path is anywhere between the mouth and the opening of the butt (anus). You may have blood in your poop (stool) or  have black poop. If you throw up (vomit), there may be blood in it.  This condition can be mild, serious, or even life-threatening. If you have a lot of bleeding, you may need to stay in the hospital.  What are the causes?  This condition may be caused by:  · Irritation and swelling of the esophagus (esophagitis). The esophagus is part of the body that moves food from your mouth to your stomach.  · Swollen veins in the butt (hemorrhoids).  · Areas of painful tearing in the opening of the butt (anal fissures). These are often caused by passing hard poop.  · Pouches that form on the colon over time (diverticulosis).  · Irritation and swelling (diverticulitis) in areas where pouches have formed on the colon.  · Growths (polyps) or cancer. Colon cancer often starts out as growths that are not cancer.  · Irritation of the stomach lining (gastritis).  · Sores (ulcers) in the stomach.  What increases the risk?  You are more likely to develop this condition if you:  · Have a certain type of infection in your stomach (Helicobacter pylori infection).  · Take certain medicines.  · Smoke.  · Drink alcohol.  What are the signs or symptoms?  Common symptoms of this condition include:  · Throwing up (vomiting) material that has bright red blood in it. It may look like coffee grounds.  · Changes in your poop. The poop may:  ? Have red blood in it.  ? Be black, look like tar, and smell stronger than normal.  ? Be red.  · Pain or cramping in the belly (abdomen).  How is this treated?  Treatment for this condition depends on the cause of the bleeding. For example:  · Sometimes, the bleeding can be stopped during a procedure that is done to find the problem (endoscopy or colonoscopy).  · Medicines can be used to:  ? Help control irritation, swelling, or infection.  ? Reduce acid in your stomach.  · Certain problems can be treated with:  ? Creams.  ? Medicines that are put in the butt (suppositories).  ? Warm baths.  · Surgery is  sometimes needed.  · If you lose a lot of blood, you may need a blood transfusion.  If bleeding is mild, you may be allowed to go home. If there is a lot of bleeding, you will need to stay in the hospital.  Follow these instructions at home:    · Take over-the-counter and prescription medicines only as told by your doctor.  · Eat foods that have a lot of fiber in them. These foods include beans, whole grains, and fresh fruits and vegetables. You can also try eating 1-3 prunes each day.  · Drink enough fluid to keep your pee (urine) pale yellow.  · Keep all follow-up visits as told by your doctor. This is important.  Contact a doctor if:  · Your symptoms do not get better.  Get help right away if:  · Your bleeding does not stop.  · You feel dizzy or you pass out (faint).  · You feel weak.  · You have very bad cramps in your back or belly.  · You pass large clumps of blood (clots) in your poop.  · Your symptoms are getting worse.  · You have chest pain or fast heartbeats.  Summary  · GI bleeding is bleeding somewhere along the path that food travels through the body (digestive tract).  · This bleeding can be caused by many things. Treatment depends on the cause of the bleeding.  · Take medicines only as told by your doctor.  · Keep all follow-up visits as told by your doctor. This is important.  This information is not intended to replace advice given to you by your health care provider. Make sure you discuss any questions you have with your health care provider.  Document Released: 09/26/2009 Document Revised: 07/31/2019 Document Reviewed: 07/31/2019  Elsevier Patient Education © 2020 [employer]

## 2020-07-01 NOTE — DISCHARGE SUMMARY
Internal Medicine Discharge Summary  Note Author: Dilan Mendez M.D.       Name Ricky Spangler     1950   Age/Sex 70 y.o. male   MRN [id number]         Admit Date:  2020       Discharge Date:   20    Service:   [facility] Internal Medicine Blue Team  Attending Physician(s):   Luzma Minor MD       Senior Resident(s):   Dilan Mendez MD  Raymon Resident(s):   Reza Lopez MD  PCP: No primary care provider on file.      Primary Diagnosis:   Non ST segment myocardial infarction (NSTEACS)    Secondary Diagnoses:                Active Problems:    NSTEMI (non-ST elevated myocardial infarction) (HCC) POA: No    Melena POA: No    Parkinson's disease (HCC) POA: No    Hypertension POA: Unknown    Type 2 diabetes mellitus (HCC) POA: Unknown    Lower GI bleeding POA: Unknown    Acute respiratory failure with hypoxia (HCC) POA: Unknown    Pancreatitis, chronic (HCC) POA: Unknown    Prostatitis POA: Yes    BPH (benign prostatic hyperplasia) POA: Unknown    COPD (chronic obstructive pulmonary disease) ([facility]) POA: Unknown  Resolved Problems:    * No resolved hospital problems. *      Hospital Summary (Brief Narrative):         70 yr old male with PMHx of Parkinson's disease, essential hypertension, NIDDM, chronic pancreatitis, BPH/ prostatitis, COPD not on home oxygen, ex smoker, colon cancer S/P resection, was transferred in from [facility] on  where he presented with acute onset typical chest pain and was diagnosed with ACS based on typical chest pain, troponin elevation and EKG findings. He was transferred in and seen by Cardiology service, started on a Heparin gtt, ASA, and had an echocardiogram  (EF 45%), followed by a cardiac catheterization () which showed multivessel disease and subsequently underwent a CABG procedure on  which was successful. He had a chest tube intrathoracically immediately post procedure.    His home Losartan was held on admission due to soft blood pressures.  He was started on low dose Lasix for gentle diuresis.    His hospital course was complicated by lower GI bleeding in the form of melena, and his Heparin gtt was thus discontinued. He had two positive fecal occult blood tests but is not actively bleeding. He has been followed by the Cardiothoracic service while inpatient.    His hemoglobin has stabilized, he is on oral iron supplementation, has not had further melenic stools and is medically clear for discharge to home today with home oxygen support. He lives in [facility] and sees the [facility]. Follow up has been arranged for him with a PCP, with repeat CBC in a week, with Cardiology and with Cardiothoracic surgery. He is being discharged on ASA, Plavix (DAPT), high dose statin, beta blocker and not currently on ACEi, which will be followed by his future providers.          Patient /Hospital Summary (Details -- Problem Oriented) :          Melena  Assessment & Plan  First noticed on 6/29  Hb stable, no further melenic stools  Follow up CBC in one week with PCP       NSTEMI (non-ST elevated myocardial infarction) ([facility])  Assessment & Plan  Patient admitted with NSTEMI.  Cardiac cath [6/22/2020] remarkable for multivessel disease, EF 45%.  CABG recommended. S/p CABG on 6/25/2020.  HbA1c 6.5, lipid panel normal.      -Continue ASA, plavix, statin, lasix  -Continue low-dose metoprolol.   -ACEI/ARB with held due to low SBP, restart PRN  -medically clear for discharge, with outpatient Cardiology follow up    Type 2 diabetes mellitus ([facility])  Assessment & Plan  -Known history of type 2 diabetes mellitus.  On metformin at home.  HbA1c 6.5   -continue metformin  -Maintain euglycemia, blood glucose 140 to 180 mg/dL    Hypertension  Assessment & Plan  -Known history of hypertension.  On losartan at home. Held due to low SBP  -Resume medications when appropriate, per PCP    Parkinson's disease ([facility])  Assessment & Plan  -Known history of Parkinson's disease.  On  Sinemet at home.  -Continue home meds    Acute respiratory failure with hypoxia (HCC)  Assessment & Plan  - new onset hypoxia post CABG, is being discharged with 0.5 LPM O2 support, to be followed for continued need as outpatient  - chronic COPD    Lower GI bleeding  Assessment & Plan  - melenic stools with positive FOBT, Heparin gtt discontinued  - H/H stable, being repleted with PO FeSO4  Plan  - follow up with outpatient GI, colonoscopy likely indicated    COPD (chronic obstructive pulmonary disease) (HCC)  Assessment & Plan  -Known history of COPD, on inhalers at home, ran out of medication recently. Not in exacerbation. CXR from outside grossly unremarkable except for right basilar atelectasis.  -RT protocol,  - discharge home with home O2 support      BPH (benign prostatic hyperplasia)  Assessment & Plan  -On finasteride and tamsulosin at home  -Continue home medications    Prostatitis  Assessment & Plan  History of prostatitis, on fifth week of Bactrim at home.  Finished possible 6 weeks course of Bactrim.  Patient not sure about exact duration  -Repeat UA normal    Pancreatitis, chronic (HCC)  Assessment & Plan  -History of chronic pancreatitis.  Recent CT abdomen/pelvis from outside negative for pancreatic mass/malignancy.  No formal diagnosis of pancreatic malignancy.  CA-19-9 normal. On enzyme supplementation at home  -Resume enzyme supplements      Consultants:     Cardiology  Cardiothoracic surgery    Procedures:        6/22/20 - cardiac catheterization  6/25/20 - CABG    Imaging/ Testing:      DX-CHEST-2 VIEWS   Final Result      Mild right basilar atelectasis. No focal consolidation or pleural effusions.      DX-CHEST-PORTABLE (1 VIEW)   Final Result      Minimal stable left basilar atelectasis. Unremarkable chest appearance otherwise.      EC-BRODY W/O CONT         DX-CHEST-PORTABLE (1 VIEW)   Final Result      1.  Perihilar interstitial opacities likely represent atelectasis. There may be a component  of interstitial edema.      2.  Endotracheal tube tip projects approximately 1.8 cm above the gavino.      3.  Right internal jugular catheter tip projects over the cavoatrial junction. No pneumothorax is identified.      CT-CHEST (THORAX) W/O   Final Result         1. Opacity seen on the recent radiograph represents bony proliferation at the right first costosternal junction. No suspicious pulmonary nodules or masses.   2. Atherosclerosis and coronary calcifications.   3. Colonic diverticulosis.      US-CAROTID DOPPLER BILAT   Final Result      US-VEIN MAPPING LOWER EXTREMITY BILAT   Final Result      DX-CHEST-PORTABLE (1 VIEW)   Final Result      Focal opacity projecting over the first costochondral junction on the right. This may be related to asymmetric prominence of the costochondral junction but an underlying mass is not excluded. Further evaluation can be performed with CT chest.         EC-ECHOCARDIOGRAM COMPLETE W/ CONT   Final Result      OUTSIDE IMAGES-CT ABDOMEN /PELVIS   Final Result      OUTSIDE IMAGES-DX CHEST   Final Result      CL-LEFT HEART CATHETERIZATION WITH POSSIBLE INTERVENTION    (Results Pending)         Discharge Medications:         Medication Reconciliation: Completed       Medication List      START taking these medications      Instructions   acetaminophen 325 MG Tabs  Commonly known as:  TYLENOL   Take 2 Tabs by mouth every 6 hours as needed for Fever (Temperature greater than 101.5 F).  Dose:  650 mg     aspirin 81 MG EC tablet   Take 1 Tab by mouth every day.  Dose:  81 mg     atorvastatin 80 MG tablet  Commonly known as:  LIPITOR   Take 1 Tab by mouth every evening.  Dose:  80 mg     clopidogrel 75 MG Tabs  Commonly known as:  PLAVIX   Take 1 Tab by mouth every day.  Dose:  75 mg     ferrous sulfate 325 (65 Fe) MG tablet   Take 1 Tab by mouth every 48 hours.  Dose:  325 mg     furosemide 20 MG Tabs  Commonly known as:  LASIX   Take 1 Tab by mouth every day.  Dose:  20 mg      magnesium oxide 400 MG Tabs tablet  Commonly known as:  MAG-OX   Take 1 Tab by mouth every day.  Dose:  400 mg     metoprolol 25 MG Tabs  Commonly known as:  LOPRESSOR   Take 1 Tab by mouth 2 times a day.  Dose:  25 mg     omeprazole 40 MG delayed-release capsule  Commonly known as:  PRILOSEC   Take 1 Cap by mouth every day.  Dose:  40 mg     simethicone 80 MG Chew  Commonly known as:  MYLICON   Take 1 Tab by mouth 3 times a day as needed (Bloating).  Dose:  80 mg     sucralfate 1 GM Tabs  Commonly known as:  CARAFATE   Take 1 Tab by mouth every 6 hours for 30 days.  Dose:  1 g     tramadol 50 MG Tabs  Commonly known as:  ULTRAM   Take 1 Tab by mouth every 6 hours as needed for up to 14 days.  Dose:  50 mg        CONTINUE taking these medications      Instructions   ALPRAZolam 1 MG Tabs  Commonly known as:  XANAX   Take 1 mg by mouth at bedtime as needed for Sleep.  Dose:  1 mg     carbidopa-levodopa  MG Tabs  Commonly known as:  SINEMET   Take 1 Tab by mouth 3 times a day.  Dose:  1 Tab     finasteride 5 MG Tabs  Commonly known as:  PROSCAR   Take 5 mg by mouth every day.  Dose:  5 mg     losartan 25 MG Tabs  Commonly known as:  COZAAR   Take 25 mg by mouth every day.  Dose:  25 mg     metFORMIN 500 MG Tabs  Commonly known as:  GLUCOPHAGE   Take 500 mg by mouth 2 times a day, with meals.  Dose:  500 mg     pancrelipase (Lip-Prot-Amyl) 6000 units Cpep  Commonly known as:  CREON 6000   Take 1 Cap by mouth 3 times a day, with meals.  Dose:  1 Cap     pantoprazole 40 MG Tbec  Commonly known as:  PROTONIX   Take 40 mg by mouth 2 times a day.  Dose:  40 mg     PARoxetine 30 MG Tabs  Commonly known as:  PAXIL   Take 40 mg by mouth every day.  Dose:  40 mg     tamsulosin 0.4 MG capsule  Commonly known as:  FLOMAX   Take 0.4 mg by mouth every day.  Dose:  0.4 mg     valACYclovir 500 MG Tabs  Commonly known as:  VALTREX   Take 1,000 mg by mouth every day.  Dose:  1,000 mg            Disposition:   Medically clear for  discharge to home    Diet:   Cardiac diet, no added salt    Activity:   Limit strenuous activity, no heavy lifting or bending    Instructions:         The patient was instructed to return to the ER in the event of worsening symptoms. I have counseled the patient on the importance of compliance and the patient has agreed to proceed with all medical recommendations and follow up plan indicated above.   The patient understands that all medications come with benefits and risks. Risks may include permanent injury or death and these risks can be minimized with close reassessment and monitoring.        Primary Care Provider:    None on file, he follows with [facility] in [facility]  Discharge summary faxed to primary care provider:  Deferred  Copy of discharge summary given to the patient: Deferred      Follow up appointment details :      Future Appointments   Date Time Provider Department Center   7/8/2020 10:00 AM Ulises Guillaume M.D. [facility] None   8/3/2020 12:15 PM CARMELA Painting [facility] None     No follow-up provider specified.      Pending Studies:        none    Time spent on discharge day patient visit, preparing discharge paperwork and arranging for patient follow up.    Summary of follow up issues:   - PCP follow up for continued medication regimen  - follow up CBC for anemia  - follow up with Cardiology, cardiothoracic surgery  - GI follow up for colonoscopy for GIB, per PCP    Discharge Time (Minutes) :    45 min  Hospital Course Type:  Inpatient Stay >2 midnights      Condition on Discharge    ______________________________________________________________________    Interval history/exam for day of discharge:     - patient seen and examined at bedside this AM, stable, NAD, no AEON, wishe sto go home  - no chest pain, eating, and ambulating well  - has required outpatient follow up  - medically clear for discharge to home      Most Recent Labs:    Lab Results   Component Value Date/Time    WBC 7.6 07/01/2020  02:58 AM    RBC 3.54 (L) 07/01/2020 02:58 AM    HEMOGLOBIN 11.1 (L) 07/01/2020 02:58 AM    HEMATOCRIT 34.5 (L) 07/01/2020 02:58 AM    MCV 97.5 07/01/2020 02:58 AM    MCH 31.4 07/01/2020 02:58 AM    MCHC 32.2 (L) 07/01/2020 02:58 AM    MPV 9.2 07/01/2020 02:58 AM    NEUTSPOLYS 63.10 07/01/2020 02:58 AM    LYMPHOCYTES 20.70 (L) 07/01/2020 02:58 AM    MONOCYTES 10.40 07/01/2020 02:58 AM    EOSINOPHILS 3.90 07/01/2020 02:58 AM    BASOPHILS 0.90 07/01/2020 02:58 AM      Lab Results   Component Value Date/Time    SODIUM 136 07/01/2020 02:58 AM    POTASSIUM 4.4 07/01/2020 02:58 AM    CHLORIDE 98 07/01/2020 02:58 AM    CO2 26 07/01/2020 02:58 AM    GLUCOSE 110 (H) 07/01/2020 02:58 AM    BUN 17 07/01/2020 02:58 AM    CREATININE 1.16 07/01/2020 02:58 AM      Lab Results   Component Value Date/Time    ALTSGPT 10 06/22/2020 03:17 AM    ASTSGOT 18 06/22/2020 03:17 AM    ALKPHOSPHAT 63 06/22/2020 03:17 AM    TBILIRUBIN 0.4 06/22/2020 03:17 AM    LIPASE 19 06/23/2020 04:05 AM    ALBUMIN 3.6 06/22/2020 03:17 AM    GLOBULIN 2.6 06/22/2020 03:17 AM    INR 0.98 06/25/2020 03:35 AM     Lab Results   Component Value Date/Time    PROTHROMBTM 13.3 06/25/2020 03:35 AM    INR 0.98 06/25/2020 03:35 AM

## 2020-07-01 NOTE — PROGRESS NOTES
Cardiac Surgery RN Navigator Daily Rounding Note  Procedure Performed: Procedure(s):  CABG, WITH ENDOSCOPIC VEIN PROCUREMENT- X3  ECHOCARDIOGRAM, TRANSESOPHAGEAL     By  Dr. Linda  6 Days Post-Op    Pertinent Overnight Events:    Occult stool still positive-still awaiting second stool sample.  H&H improved overnight.    Below admit weight but fluid positive    Remains on .5 L NC    Pertinent neuro findings/needs: A&O    Cardiac/hemodynamics:  Temp (24hrs), Av.5 °C (97.7 °F), Min:36.1 °C (97 °F), Max:37 °C (98.6 °F)    Heart rhythm: NSR with BBB  Temp:  [36.1 °C (97 °F)-37 °C (98.6 °F)] 36.2 °C (97.2 °F)  Pulse:  [62-73] 69  Resp:  [16-18] 17  BP: ()/(50-62) 101/55  SpO2:  [92 %-98 %] 93 %      IV gtts:      12-hour chest tube output: n/a    /Weights:  Admit weight: Weight: 82 kg (180 lb 12.4 oz)  Current Weight: Weight: 75.5 kg (166 lb 7.2 oz) (20 0425)  Weight change: -0.6 kg (-1 lb 5.2 oz)    Intake/Output Summary (Last 24 hours) at 2020 0803  Last data filed at 2020 0618  Gross per 24 hour   Intake 1471 ml   Output 1665 ml   Net -194 ml       Adequate urine output: yes 625 cc    Respiratory:        Pertinent respiratory findings/needs: .5 liters NC    GI findings/needs: BM+ for occult blood    Labs:  Recent Labs     20  1214 20  0306 20  0258   WBC 9.7 7.6 7.6   RBC 3.67* 3.34* 3.54*   HEMOGLOBIN 11.5* 10.4* 11.1*   HEMATOCRIT 34.6* 32.1* 34.5*   MCV 94.3 96.1 97.5   MCH 31.3 31.1 31.4   MCHC 33.2* 32.4* 32.2*   RDW 48.5 48.6 50.4*   PLATELETCT 205 223 274   MPV 9.6 9.3 9.2     Recent Labs     20  0244 20  0258   SODIUM 134* 136   POTASSIUM 4.7 4.4   CHLORIDE 97 98   CO2 29 26   GLUCOSE 119* 110*   BUN 13 17   CREATININE 1.09 1.16   CALCIUM 8.8 9.1     INR:       Required Cardiac medications review:  ASA:  Yes  Plavix: Yes  BB: Yes  ACE/ARB: No; contraindicated because of Normal EF-BP's also too borderline to allow for home regimen to start  Statin:  yes  Diuretic: yes    LDA's necessity reviewed: YES    Thoughts and considerations for team rounding:    Iron supplement needed? Iron levels were low yesterday    Discharge plan:    Home tomorrow  CT surgery f/u appt in place  Cardiology virtual appt set up-patient's mychart now active

## 2020-07-01 NOTE — PROGRESS NOTES
Assumed care of patient, bedside report received from TANIA Ball. Updated on POC, call light within reach and fall precautions in place. Bed locked and in lowest position. Patient instructed to call for assistance before getting out of bed. All questions answered, no other needs at this time.

## 2020-07-01 NOTE — PROGRESS NOTES
Report received from Elyssa MARIN. Pt care assumed. Assessment performed. Pt AOx4. Pt laying supine in bed. Pt denies pain and no signs of distress. Bed in low, locked position. Pt educated on calling to ambulate. Call light within reach. Treaded socks on pt.  Hourly rounding in place.

## 2020-07-01 NOTE — ASSESSMENT & PLAN NOTE
- melenic stools with positive FOBT, Heparin gtt discontinued  - H/H stable, being repleted with PO FeSO4  Plan  - follow up with outpatient GI, colonoscopy likely indicated  - prescribed oral iron supplements at discharge  - arrangements made for referral to gastroenterologist upon returning home to see his PCP

## 2020-07-01 NOTE — CARE PLAN
Problem: Post Op Day 4 CABG/Heart Valve Replacement  Goal: Optimal care of the Post Op CABG/Heart Valve replacement Post Op Day 4  Outcome: PROGRESSING AS EXPECTED  Intervention: Daily Weights  Note: Complete for 6/30  Intervention: Up in chair for all meals  Note: Complete x3  Intervention: IS q 1 hour while awake and record best IS volume  Note: Patient encouraged for continuous IS use  Intervention: Ambulate, increasing the distance each time x 3 and before bed  Note: Patient walked 4 times today   Intervention: Shower daily and clean incisions twice daily with soap and water  Note: Shower complete and incision care done x2

## 2020-07-01 NOTE — PROGRESS NOTES
Daily Progress Note:     Date of Service: 7/1/2020  Primary Team: [facility] IM Blue Team  Attending: Luzma Minor M.D.   Senior Resident: Dr. Mendez  Intern: Dr. Lopez  Contact:  [phone]    Chief Complaint: 69-year-old male transferred from outside hospital for further management of NSTEMI.  Receiving heparin drip.  Cardiac cath remarkable for multivessel disease and reduced EF 45%.  Patient underwent CABG on 6/25/2020    Subjective:   No overnight events  Patient is ambulating and reports no pain or distress  No melena today  Cardiothoracic surgery has cleared the patient for discharge    Consultants/Specialty:  Cardiology  Cardio Thoracic surgery    Review of Systems:    Review of Systems   Constitutional: Negative for chills and fever.   HENT: Negative for hearing loss.    Respiratory: Negative for cough, hemoptysis and wheezing.    Cardiovascular: Negative for chest pain and palpitations.   Gastrointestinal: Negative for blood in stool and melena.   Genitourinary: Negative for dysuria.   Musculoskeletal: Negative for myalgias.   Skin: Negative for rash.   Neurological: Negative for dizziness.   Psychiatric/Behavioral: Negative for depression.       Objective Data:   Physical Exam:   Vitals:   Temp:  [36.1 °C (97 °F)-37.1 °C (98.7 °F)] 36.8 °C (98.3 °F)  Pulse:  [65-73] 65  Resp:  [16-18] 18  BP: ()/(50-63) 98/54  SpO2:  [91 %-98 %] 98 %    Physical Exam  HENT:      Head: Normocephalic and atraumatic.   Cardiovascular:      Rate and Rhythm: Normal rate and regular rhythm.   Pulmonary:      Breath sounds: Normal breath sounds.   Abdominal:      General: There is no distension.   Skin:     General: Skin is dry.   Neurological:      Mental Status: He is alert and oriented to person, place, and time.           Labs:   Recent Labs     06/29/20  1214 06/30/20  0306 07/01/20  0258   WBC 9.7 7.6 7.6   RBC 3.67* 3.34* 3.54*   HEMOGLOBIN 11.5* 10.4* 11.1*   HEMATOCRIT 34.6* 32.1* 34.5*   MCV 94.3 96.1 97.5   MCH  31.3 31.1 31.4   RDW 48.5 48.6 50.4*   PLATELETCT 205 223 274   MPV 9.6 9.3 9.2   NEUTSPOLYS  --   --  63.10   LYMPHOCYTES  --   --  20.70*   MONOCYTES  --   --  10.40   EOSINOPHILS  --   --  3.90   BASOPHILS  --   --  0.90      Recent Labs     06/29/20  0244 07/01/20  0258   SODIUM 134* 136   POTASSIUM 4.7 4.4   CHLORIDE 97 98   CO2 29 26   GLUCOSE 119* 110*   BUN 13 17      Recent Labs     06/29/20  0244 07/01/20  0258   CREATININE 1.09 1.16        Imaging:   DX-CHEST-2 VIEWS   Final Result      Mild right basilar atelectasis. No focal consolidation or pleural effusions.      DX-CHEST-PORTABLE (1 VIEW)   Final Result      Minimal stable left basilar atelectasis. Unremarkable chest appearance otherwise.      EC-BRODY W/O CONT         DX-CHEST-PORTABLE (1 VIEW)   Final Result      1.  Perihilar interstitial opacities likely represent atelectasis. There may be a component of interstitial edema.      2.  Endotracheal tube tip projects approximately 1.8 cm above the gavino.      3.  Right internal jugular catheter tip projects over the cavoatrial junction. No pneumothorax is identified.      CT-CHEST (THORAX) W/O   Final Result         1. Opacity seen on the recent radiograph represents bony proliferation at the right first costosternal junction. No suspicious pulmonary nodules or masses.   2. Atherosclerosis and coronary calcifications.   3. Colonic diverticulosis.      US-CAROTID DOPPLER BILAT   Final Result      US-VEIN MAPPING LOWER EXTREMITY BILAT   Final Result      DX-CHEST-PORTABLE (1 VIEW)   Final Result      Focal opacity projecting over the first costochondral junction on the right. This may be related to asymmetric prominence of the costochondral junction but an underlying mass is not excluded. Further evaluation can be performed with CT chest.         EC-ECHOCARDIOGRAM COMPLETE W/ CONT   Final Result      OUTSIDE IMAGES-CT ABDOMEN /PELVIS   Final Result      OUTSIDE IMAGES-DX CHEST   Final Result       CL-LEFT HEART CATHETERIZATION WITH POSSIBLE INTERVENTION    (Results Pending)        Melena  Assessment & Plan  First noticed on 6/29  Hb stable, no further melenic stools  Follow up CBC in one week with PCP  Oral iron supplements prescribed to take after discharge  Patient will follow up with Gastroenterologist after he sees his PCP when he returns home       NSTEMI (non-ST elevated myocardial infarction) ([facility])  Assessment & Plan  Patient admitted with NSTEMI.  Cardiac cath [6/22/2020] remarkable for multivessel disease, EF 45%.  CABG recommended. S/p CABG on 6/25/2020.  HbA1c 6.5, lipid panel normal.      -Continue ASA, plavix, statin, lasix  -Continue low-dose metoprolol.   -ACEI/ARB with held due to low SBP, restart PRN  -medically clear for discharge, with outpatient Cardiology follow up    Type 2 diabetes mellitus ([facility])  Assessment & Plan  -Known history of type 2 diabetes mellitus.  On metformin at home.  HbA1c 6.5   -continue metformin  -Maintain euglycemia, blood glucose 140 to 180 mg/dL    Hypertension  Assessment & Plan  -Known history of hypertension.  On losartan at home. Held due to low SBP  -Resume medications when appropriate, per PCP    Parkinson's disease ([facility])  Assessment & Plan  -Known history of Parkinson's disease.  On Sinemet at home.  -Continue home meds    Acute respiratory failure with hypoxia ([facility])  Assessment & Plan  - new onset hypoxia post CABG, is being discharged with 0.5 LPM O2 support, to be followed for continued need as outpatient  - chronic COPD    Lower GI bleeding  Assessment & Plan  - melenic stools with positive FOBT, Heparin gtt discontinued  - H/H stable, being repleted with PO FeSO4  Plan  - follow up with outpatient GI, colonoscopy likely indicated  - prescribed oral iron supplements at discharge  - arrangements made for referral to gastroenterologist upon returning home to see his PCP    COPD (chronic obstructive pulmonary disease) ([facility])  Assessment & Plan  -Known  history of COPD, on inhalers at home, ran out of medication recently. Not in exacerbation. CXR from outside grossly unremarkable except for right basilar atelectasis.  -RT protocol,  - discharge home with home O2 support      BPH (benign prostatic hyperplasia)  Assessment & Plan  -On finasteride and tamsulosin at home  -Continue home medications    Prostatitis- (present on admission)  Assessment & Plan  History of prostatitis, on fifth week of Bactrim at home.  Finished possible 6 weeks course of Bactrim.  Patient not sure about exact duration  -Repeat UA normal    Pancreatitis, chronic (HCC)  Assessment & Plan  -History of chronic pancreatitis.  Recent CT abdomen/pelvis from outside negative for pancreatic mass/malignancy.  No formal diagnosis of pancreatic malignancy.  CA-19-9 normal. On enzyme supplementation at home  -Resume enzyme supplements

## 2020-07-01 NOTE — DISCHARGE PLANNING
Anticipated Discharge Disposition: Home with DME (O2)    Action: d/c order in place for pt. LSW tc pt's spouse, Brenda ([phone]) to ensure that she has O2 for pt. No answer. LSW left a vm with name and number for call back.   Previous notes state that Brenda had to  O2 for pt from [place].     Barriers to Discharge: None    Plan: LSW to assist as needed     Addendum 1015  LSW received tc from pt's spouse, Brenda and was informed that she does have pt's O2. Brenda was asking LSW if O2 is for short term use or long term. LSW explained to Brenda that she was not sure and would be an appropriate question for attending MD or bedside RN. No further questions or concerns for LSW at this time.

## 2020-07-03 LAB
LV EJECT FRACT  99904: 70
LV EJECT FRACT MOD 2C 99903: 73.79
LV EJECT FRACT MOD 4C 99902: 47.27

## 2020-07-06 ENCOUNTER — TELEPHONE (OUTPATIENT)
Dept: CARDIOTHORACIC SURGERY | Facility: MEDICAL CENTER | Age: 70
End: 2020-07-06

## 2020-07-06 NOTE — TELEPHONE ENCOUNTER
Hospital follow up call performed.    Hospital follow up call:    Patient states they are    is using their IS; volume is improved. Volume is 2500    is walking everyday, distance is increased from the hospital.    Is not weighing themselves daily.  Hospital discharge weight was 166 1/2 lbs was provided.    is taking their prescribed meds as directed.  They have no medication questions.    Has had a bowel movement since discharge.    Is showering everyday and is wearing their compression hose.    States that post op pain is well controlled to none existent.  Narcotics are not being utilized.      Follow up education on ZITA hose during day not the night was provided to the them.  No additional questions.    States his O2 needs were increased from 1/2 L to 2 L NC when he went to the ER for nausea which was treated with zofran.  He now has an O2 concentrator.    Call time 5 minutes.

## 2020-07-08 ENCOUNTER — TELEMEDICINE (OUTPATIENT)
Dept: CARDIOLOGY | Facility: MEDICAL CENTER | Age: 70
End: 2020-07-08
Payer: MEDICARE

## 2020-07-08 VITALS
HEART RATE: 68 BPM | DIASTOLIC BLOOD PRESSURE: 74 MMHG | HEIGHT: 67 IN | WEIGHT: 164 LBS | SYSTOLIC BLOOD PRESSURE: 116 MMHG | BODY MASS INDEX: 25.74 KG/M2 | OXYGEN SATURATION: 94 %

## 2020-07-08 DIAGNOSIS — I25.10 CORONARY ARTERY DISEASE INVOLVING NATIVE CORONARY ARTERY OF NATIVE HEART WITHOUT ANGINA PECTORIS: ICD-10-CM

## 2020-07-08 DIAGNOSIS — I50.32 CHRONIC DIASTOLIC CONGESTIVE HEART FAILURE (HCC): ICD-10-CM

## 2020-07-08 DIAGNOSIS — J44.9 CHRONIC OBSTRUCTIVE PULMONARY DISEASE, UNSPECIFIED COPD TYPE (HCC): ICD-10-CM

## 2020-07-08 DIAGNOSIS — I10 ESSENTIAL HYPERTENSION: ICD-10-CM

## 2020-07-08 DIAGNOSIS — J96.11 CHRONIC HYPOXEMIC RESPIRATORY FAILURE (HCC): ICD-10-CM

## 2020-07-08 DIAGNOSIS — D50.0 IRON DEFICIENCY ANEMIA DUE TO CHRONIC BLOOD LOSS: ICD-10-CM

## 2020-07-08 DIAGNOSIS — K92.2 LOWER GI BLEEDING: ICD-10-CM

## 2020-07-08 DIAGNOSIS — E11.9 TYPE 2 DIABETES MELLITUS WITHOUT COMPLICATION, WITHOUT LONG-TERM CURRENT USE OF INSULIN (HCC): ICD-10-CM

## 2020-07-08 PROBLEM — J96.01 ACUTE RESPIRATORY FAILURE WITH HYPOXIA (HCC): Status: RESOLVED | Noted: 2020-07-01 | Resolved: 2020-07-08

## 2020-07-08 PROCEDURE — 99214 OFFICE O/P EST MOD 30 MIN: CPT | Mod: 95,CR | Performed by: INTERNAL MEDICINE

## 2020-07-08 RX ORDER — PAROXETINE HYDROCHLORIDE 40 MG/1
40 TABLET, FILM COATED ORAL DAILY
COMMUNITY

## 2020-07-08 ASSESSMENT — FIBROSIS 4 INDEX: FIB4 SCORE: 1.45

## 2020-07-08 NOTE — PROGRESS NOTES
Cardiology Virtual Follow-up Consultation Note    Date of note:    7/8/2020    Primary Care Provider: No primary care provider on file.  Referring Provider: Dr. Osmani Hirsch    Patient Name: Ricky Spangler   YOB: 1950  MRN:              [id number]    Chief Complaint: CAD    History of Present Illness: Ricky Spangler is a 70 y.o. male whose current medical problems include hypertension, type II DM, COPD, parkinson's disease, colon cancer s/p resection, chronic pancreatitis, and NSTEMI 6/20/2020 s/p CABG ( who is here for follow-up.    Last seen by Dr. Hirsch on 6/24/2020.    Interim Events:   Walks a couple times a day up and down his street, and no chest pain. CT surgery site without erythema or swelling.     In terms of COPD, remains on oxygen, plans to wean himself off this.     Hypertension well controlled.     ROS  Constitution: Negative for chills, fever and night sweats.   HENT: Negative for nosebleeds.    Eyes: Negative for vision loss in left eye and vision loss in right eye.   Respiratory: Negative for hemoptysis.    Gastrointestinal: Negative for hematemesis, hematochezia and melena.   Genitourinary: Negative for hematuria.   Neurological: Negative for focal weakness, numbness and paresthesias.         Past Medical History:   Diagnosis Date   • BPH (benign prostatic hyperplasia) 6/20/2020   • Hypertension 6/20/2020   • Lower GI bleeding 7/1/2020         Past Surgical History:   Procedure Laterality Date   • MULTIPLE CORONARY ARTERY BYPASS ENDO VEIN HARVEST  6/25/2020    Procedure: CABG, WITH ENDOSCOPIC VEIN PROCUREMENT- X3;  Surgeon: Mychal Linda D.O.;  Location: [facility];  Service: Cardiothoracic   • BRODY  6/25/2020    Procedure: ECHOCARDIOGRAM, TRANSESOPHAGEAL;  Surgeon: Mychal Linda D.O.;  Location: SURGERY [facility];  Service: Cardiothoracic         Current Outpatient Medications   Medication Sig Dispense Refill   • paroxetine  (PAXIL) 40 MG tablet Take 40 mg by mouth every day.     • ferrous sulfate 325 (65 Fe) MG tablet Take 1 Tab by mouth every 48 hours. 90 Tab 1   • aspirin 81 MG EC tablet Take 1 Tab by mouth every day. 90 Tab 1   • atorvastatin (LIPITOR) 80 MG tablet Take 1 Tab by mouth every evening. 90 Tab 1   • clopidogrel (PLAVIX) 75 MG Tab Take 1 Tab by mouth every day. 90 Tab 1   • metoprolol (LOPRESSOR) 25 MG Tab Take 1 Tab by mouth 2 times a day. 90 Tab 1   • magnesium oxide (MAG-OX) 400 MG Tab tablet Take 1 Tab by mouth every day. 90 Tab 1   • furosemide (LASIX) 20 MG Tab Take 1 Tab by mouth every day. 90 Tab 1   • simethicone (MYLICON) 80 MG Chew Tab Take 1 Tab by mouth 3 times a day as needed (Bloating). 90 Tab 3   • sucralfate (CARAFATE) 1 GM Tab Take 1 Tab by mouth every 6 hours for 30 days. 40 Tab 1   • carbidopa-levodopa (SINEMET)  MG Tab Take 1 Tab by mouth 3 times a day.     • valACYclovir (VALTREX) 500 MG Tab Take 1,000 mg by mouth every day.     • losartan (COZAAR) 25 MG Tab Take 25 mg by mouth every day.     • pantoprazole (PROTONIX) 40 MG Tablet Delayed Response Take 40 mg by mouth 2 times a day.     • tamsulosin (FLOMAX) 0.4 MG capsule Take 0.4 mg by mouth every day.     • finasteride (PROSCAR) 5 MG Tab Take 5 mg by mouth every day.     • metFORMIN (GLUCOPHAGE) 500 MG Tab Take 500 mg by mouth 2 times a day, with meals.     • ALPRAZolam (XANAX) 1 MG Tab Take 1 mg by mouth at bedtime as needed for Sleep.       No current facility-administered medications for this visit.          Allergies   Allergen Reactions   • Tetanus Toxoid Swelling         History reviewed. No pertinent family history.      Social History     Socioeconomic History   • Marital status:      Spouse name: Not on file   • Number of children: Not on file   • Years of education: Not on file   • Highest education level: Not on file   Occupational History   • Not on file   Social Needs   • Financial resource strain: Not on file   • Food  "insecurity     Worry: Not on file     Inability: Not on file   • Transportation needs     Medical: Not on file     Non-medical: Not on file   Tobacco Use   • Smoking status: Former Smoker   • Smokeless tobacco: Never Used   • Tobacco comment: pt quit smoking when he was 21.   Substance and Sexual Activity   • Alcohol use: Not on file   • Drug use: Not on file   • Sexual activity: Not on file   Lifestyle   • Physical activity     Days per week: Not on file     Minutes per session: Not on file   • Stress: Not on file   Relationships   • Social connections     Talks on phone: Not on file     Gets together: Not on file     Attends Restoration[employer] service: Not on file     Active member of club or organization: Not on file     Attends meetings of clubs or organizations: Not on file     Relationship status: Not on file   • Intimate partner violence     Fear of current or ex partner: Not on file     Emotionally abused: Not on file     Physically abused: Not on file     Forced sexual activity: Not on file   Other Topics Concern   • Not on file   Social History Narrative   • Not on file         Physical Exam:  Ambulatory Vitals  /74 (BP Location: Left arm, Patient Position: Sitting)   Pulse 68   Ht 1.702 m (5' 7\")   Wt 74.4 kg (164 lb)   SpO2 94%    Oxygen Therapy:  Pulse Oximetry: 94 %  BP Readings from Last 4 Encounters:   07/08/20 116/74   07/01/20 (!) 98/54       Weight/BMI: Body mass index is 25.69 kg/m².  Wt Readings from Last 4 Encounters:   07/08/20 74.4 kg (164 lb)   07/01/20 75.5 kg (166 lb 7.2 oz)       General: No apparent distress  Eyes: nl conjunctiva  Neck: JVP appeared normal from afar  Lungs: normal respiratory effort  Heart: RRR per patient. Incision site from afar looked good and normal.   Abdomen: non distended  Neurological: LEMON  Psychiatric: Appropriate affect, A/O x 3, intact judgement and insight  Skin: no visible rashes      Lab Data Review:  Lab Results   Component Value Date/Time    CHOLSTRLTOT " 138 06/20/2020 02:25 PM    LDL 77 06/20/2020 02:25 PM    HDL 41 06/20/2020 02:25 PM    TRIGLYCERIDE 100 06/20/2020 02:25 PM       Lab Results   Component Value Date/Time    SODIUM 136 07/01/2020 02:58 AM    POTASSIUM 4.4 07/01/2020 02:58 AM    CHLORIDE 98 07/01/2020 02:58 AM    CO2 26 07/01/2020 02:58 AM    GLUCOSE 110 (H) 07/01/2020 02:58 AM    BUN 17 07/01/2020 02:58 AM    CREATININE 1.16 07/01/2020 02:58 AM     Lab Results   Component Value Date/Time    ALKPHOSPHAT 63 06/22/2020 03:17 AM    ASTSGOT 18 06/22/2020 03:17 AM    ALTSGPT 10 06/22/2020 03:17 AM    TBILIRUBIN 0.4 06/22/2020 03:17 AM      Lab Results   Component Value Date/Time    WBC 7.6 07/01/2020 02:58 AM     No components found for: HBGA1C  No components found for: TROPONIN  No components found for: BNP      Cardiac Imaging and Procedures Review:    EKG dated 6/25/2020 : My personal interpretation is NSR, RBBB, inferoposterior infarct, low voltage    Echo dated 6/20/2020:   CONCLUSIONS  No prior study is available for comparison.   Technically difficult study - adequate information is obtained.   Normal left ventricular chamber size.  Mild concentric left ventricular hypertrophy.  Normal left ventricular systolic function.    Left   ventricular ejection fraction is visually estimated to be 55%.    [facility] (6/22/2020):   IMPRESSIONS:  1. Acute NSTEMI vs recent IMI  2. Severe LM and RCA disease  3. Mildly impaired LVEF  4. Moderate elevation in LVEDP    Findings   Hemodynamics: Aorta: 130/75 mmHg  LV: 130/21 mmHg     Coronary Anatomy              Left Main: Dsital 80% stenosis              LAD: Proximal ectasia with extrinsic calcification and no significant stenosis              LCx: osital 30% stenosis              RCA: Dominant, 30% stenosis proximally. Distally there is a non-occlusive thrombus. The PDA has a mid thrombus, non-occlusive. The PLB is normal.                 Left Ventriculography: LVEF= 45%. The basal and mid inferior walls are  hypokinetic. No MR, normal root.     Radiology test Review:  CXR: 2020  FINDINGS:     LUNGS: Mild right basilar atelectasis. No focal consolidation. No effusions.  PNEUMOTHORAX: None.  LINES AND TUBES: None.  MEDIASTINUM: No cardiomegaly. Atherosclerosis.  MUSCULOSKELETAL STRUCTURES: Median sternotomy.    Carotid Artery Ultrasound (2020):   Vascular Laboratory   CONCLUSIONS   Mild bilateral internal carotid artery stenosis (<50%).       Medical Decision Makin. Chronic diastolic congestive heart failure (HCC)  NYHA class III, stage C by description of symptoms, secondary to ischemic cardiomyopathy  -continue lasix 20mg PO daily, discussed dehydration precautions as given he has been revascularized, he may be able to eventually wean off lasix  -check BMP, mag, NT-pro BNP (or just BNP as he's in [place]) in 1 month  -f/u with MAGNOLIA virtually in 1 month to check in on symptoms and assess labs for dehydration  -consider spironolactone if BP issues or if lasix weaned off.    2. Essential hypertension  Well controlled  -continue exercise, losartan, metoprolol.     3. Coronary artery disease involving native coronary artery of native heart without angina pectoris  S/p NSTEMI 2020  -continue aspirin 81mg PO daily for life  -continue plavix 75mg PO daily, given concern for LGIB, would consider a 6 month instead of 12 month duration of DAPT.   -continue statin, check lipid panel, hgbA1c with next labs.    4. Chronic hypoxemic respiratory failure (HCC)  Likely secondary to COPD  -continue to monitor locally      5. Chronic obstructive pulmonary disease, unspecified COPD type (HCC)  Per local PCP.    6. Lower GI bleeding  Associated with iron deficieincy anemia  -check CBC with next labs, may need to increase oral iron dose vs arrange for IV iron loading.     Return in about 4 weeks (around 2020). with MAGNOLIA virtually as he lives in [place].     This encounter was conducted via [employer].   Verbal consent  was obtained. Patient's identity was verified.      Ulises Guillaume MD, [facility] V[facility], [address]  [address] [address]  Phone: [phone]  Fax: [phone]

## 2020-07-21 ENCOUNTER — TELEPHONE (OUTPATIENT)
Dept: CARDIOLOGY | Facility: MEDICAL CENTER | Age: 70
End: 2020-07-21

## 2020-07-21 NOTE — TELEPHONE ENCOUNTER
ESTELA pt needs lab orders sent to [facility] - Dr. Rodríguez     Fax#[phone]  Ph#[phone]

## 2020-07-29 NOTE — PROGRESS NOTES
CHIEF COMPLAINT: Post-op visit     PROCEDURE: 6/25/2020 [facility]  CABG x 3, EVH, BRODY    HPI: ***    There were no vitals taken for this visit.    PHYSICAL EXAM:  CARDIAC: S1, S2, no murmurs, gallops, rub  PULMONARY: CTA bilaterally  SKIN: no edema  INCISIONS: Sternum stable, wounds well healed    PLAN: ***  We discussed continued post operative sternal precautions as well as driving restrictions moving forward.      Continue plavix for 3 months or per your cardiologist's recommendations.    Overall, we are very pleased with his progress and have instructed him to follow-up with us in the future should he have any concerns related to surgery.  Otherwise, we will see him on a PRN basis. He will continue to follow-up with his cardiologist and primary care physician. He has been informed that any further prescription refills should be done through primary care and/or cardiology.  He acknowledged understanding.      Thank you for allowing us to participate in the care of this very pleasant patient and please let us know if there is any way we may be of further assistance.

## 2020-08-03 ENCOUNTER — APPOINTMENT (OUTPATIENT)
Dept: CARDIOTHORACIC SURGERY | Facility: MEDICAL CENTER | Age: 70
End: 2020-08-03
Payer: MEDICARE

## 2020-09-09 ENCOUNTER — TELEPHONE (OUTPATIENT)
Dept: CARDIOLOGY | Facility: MEDICAL CENTER | Age: 70
End: 2020-09-09

## 2020-09-09 NOTE — TELEPHONE ENCOUNTER
Called patient regarding his labs patient stated that he had then done at [facility]: F- [phone], P- [phone]   Fax medical request

## 2020-09-14 ENCOUNTER — TELEMEDICINE (OUTPATIENT)
Dept: CARDIOLOGY | Facility: MEDICAL CENTER | Age: 70
End: 2020-09-14
Payer: MEDICARE

## 2020-09-14 VITALS
HEART RATE: 81 BPM | SYSTOLIC BLOOD PRESSURE: 152 MMHG | HEIGHT: 67 IN | WEIGHT: 165 LBS | BODY MASS INDEX: 25.9 KG/M2 | DIASTOLIC BLOOD PRESSURE: 76 MMHG

## 2020-09-14 DIAGNOSIS — K92.2 LOWER GI BLEEDING: ICD-10-CM

## 2020-09-14 DIAGNOSIS — Z95.1 S/P CABG X 3: ICD-10-CM

## 2020-09-14 DIAGNOSIS — I25.10 CORONARY ARTERY DISEASE INVOLVING NATIVE CORONARY ARTERY OF NATIVE HEART WITHOUT ANGINA PECTORIS: ICD-10-CM

## 2020-09-14 DIAGNOSIS — I95.1 ORTHOSTATIC HYPOTENSION: ICD-10-CM

## 2020-09-14 DIAGNOSIS — E11.9 TYPE 2 DIABETES MELLITUS WITHOUT COMPLICATION, WITHOUT LONG-TERM CURRENT USE OF INSULIN (HCC): ICD-10-CM

## 2020-09-14 DIAGNOSIS — I10 ESSENTIAL HYPERTENSION: ICD-10-CM

## 2020-09-14 DIAGNOSIS — I21.4 NSTEMI (NON-ST ELEVATED MYOCARDIAL INFARCTION) (HCC): ICD-10-CM

## 2020-09-14 DIAGNOSIS — I50.32 CHRONIC DIASTOLIC CONGESTIVE HEART FAILURE (HCC): ICD-10-CM

## 2020-09-14 DIAGNOSIS — D50.0 IRON DEFICIENCY ANEMIA DUE TO CHRONIC BLOOD LOSS: ICD-10-CM

## 2020-09-14 PROCEDURE — 99442 PR PHYSICIAN TELEPHONE EVALUATION 11-20 MIN: CPT | Performed by: PHYSICIAN ASSISTANT

## 2020-09-14 ASSESSMENT — ENCOUNTER SYMPTOMS
DIZZINESS: 1
SHORTNESS OF BREATH: 0
BLOOD IN STOOL: 0
COUGH: 0
PALPITATIONS: 0

## 2020-09-14 ASSESSMENT — FIBROSIS 4 INDEX: FIB4 SCORE: 1.45

## 2020-09-14 NOTE — PROGRESS NOTES
Chief Complaint   Patient presents with   • Hypertension     Telephone Appointment Visit   As a means of avoiding spread of COVID-19, this visit is being conducted by telephone. This telephone visit was initiated by the patient and they verbally consented.    Time at start of call: 2:58    Time at end of call: 3:15  Total Time Spent: 11-20 minutes    Subjective:   Ricky Spangler is a 70 y.o. male who presents today for follow-up.    Patient of Dr. Guillaume.  Current medical problems include hypertension, DM 2, COPD, Parkinson's disease, chronic pancreatitis, coronary artery disease s/p CABG June 2020.     Initially transferred to [facility] from [facility] 6/20/2020 for NSTEMI.  Found to have 80% stenosis in the distal left main and referred to CT surgery.  Dr. Linda performed CABG x3 (LIMA to LAD, SVG to OM1, SVG to PDA) 6/25/20. His post-op course was complication. Seen in follow by Dr. Guillaume 2 months ago, was walking several times a day. BMP, CBC, A1c, Lipid profile, mag, BNP were ordered.     He had labs drawn last week but not available to review today.   He reports daily hypotension in the evenings, about /40-50s. He does have orthostatic symptoms, once had a fall after going from sitting to standing. In the morning his blood pressures are good, about 120-130s systolic.  He stopped the lasix a few weeks ago.    He usually walks a couple blocks a day, has been limited due to smoke in the region. He does have a stationary bike he could ride indoors. No exertional symptoms.     Denies any blood in stool, dark tarry stool.     Past Medical History:   Diagnosis Date   • Acute respiratory failure with hypoxia (HCC) 7/1/2020   • BPH (benign prostatic hyperplasia) 6/20/2020   • Hypertension 6/20/2020   • Lower GI bleeding 7/1/2020     Past Surgical History:   Procedure Laterality Date   • MULTIPLE CORONARY ARTERY BYPASS ENDO VEIN HARVEST  6/25/2020    Procedure: CABG, WITH ENDOSCOPIC VEIN  PROCUREMENT- X3;  Surgeon: Mychal Linda D.O.;  Location: SURGERY [facility];  Service: Cardiothoracic   • BRODY  6/25/2020    Procedure: ECHOCARDIOGRAM, TRANSESOPHAGEAL;  Surgeon: Mychal Linda D.O.;  Location: SURGERY [facility];  Service: Cardiothoracic     History reviewed. No pertinent family history.  Social History     Socioeconomic History   • Marital status:      Spouse name: Not on file   • Number of children: Not on file   • Years of education: Not on file   • Highest education level: Not on file   Occupational History   • Not on file   Social Needs   • Financial resource strain: Not on file   • Food insecurity     Worry: Not on file     Inability: Not on file   • Transportation needs     Medical: Not on file     Non-medical: Not on file   Tobacco Use   • Smoking status: Former Smoker   • Smokeless tobacco: Never Used   • Tobacco comment: pt quit smoking when he was 21.   Substance and Sexual Activity   • Alcohol use: Not on file   • Drug use: Not on file   • Sexual activity: Not on file   Lifestyle   • Physical activity     Days per week: Not on file     Minutes per session: Not on file   • Stress: Not on file   Relationships   • Social connections     Talks on phone: Not on file     Gets together: Not on file     Attends [employer] service: Not on file     Active member of club or organization: Not on file     Attends meetings of clubs or organizations: Not on file     Relationship status: Not on file   • Intimate partner violence     Fear of current or ex partner: Not on file     Emotionally abused: Not on file     Physically abused: Not on file     Forced sexual activity: Not on file   Other Topics Concern   • Not on file   Social History Narrative   • Not on file     Allergies   Allergen Reactions   • Tetanus Toxoid Swelling     Outpatient Encounter Medications as of 9/14/2020   Medication Sig Dispense Refill   • paroxetine (PAXIL) 40 MG tablet Take 40 mg by mouth every  "day.     • ferrous sulfate 325 (65 Fe) MG tablet Take 1 Tab by mouth every 48 hours. 90 Tab 1   • aspirin 81 MG EC tablet Take 1 Tab by mouth every day. 90 Tab 1   • atorvastatin (LIPITOR) 80 MG tablet Take 1 Tab by mouth every evening. 90 Tab 1   • clopidogrel (PLAVIX) 75 MG Tab Take 1 Tab by mouth every day. 90 Tab 1   • metoprolol (LOPRESSOR) 25 MG Tab Take 1 Tab by mouth 2 times a day. 90 Tab 1   • magnesium oxide (MAG-OX) 400 MG Tab tablet Take 1 Tab by mouth every day. 90 Tab 1   • simethicone (MYLICON) 80 MG Chew Tab Take 1 Tab by mouth 3 times a day as needed (Bloating). 90 Tab 3   • carbidopa-levodopa (SINEMET)  MG Tab Take 1 Tab by mouth 3 times a day.     • valACYclovir (VALTREX) 500 MG Tab Take 1,000 mg by mouth every day.     • losartan (COZAAR) 25 MG Tab Take 25 mg by mouth every day.     • pantoprazole (PROTONIX) 40 MG Tablet Delayed Response Take 40 mg by mouth 2 times a day.     • tamsulosin (FLOMAX) 0.4 MG capsule Take 0.4 mg by mouth every day.     • finasteride (PROSCAR) 5 MG Tab Take 5 mg by mouth every day.     • metFORMIN (GLUCOPHAGE) 500 MG Tab Take 500 mg by mouth 2 times a day, with meals.     • ALPRAZolam (XANAX) 1 MG Tab Take 1 mg by mouth at bedtime as needed for Sleep.     • [DISCONTINUED] furosemide (LASIX) 20 MG Tab Take 1 Tab by mouth every day. (Patient not taking: Reported on 9/14/2020) 90 Tab 1     No facility-administered encounter medications on file as of 9/14/2020.      Review of Systems   Constitutional: Negative for malaise/fatigue.   Respiratory: Negative for cough and shortness of breath.    Cardiovascular: Negative for chest pain, palpitations and leg swelling.   Gastrointestinal: Negative for blood in stool and melena.   Genitourinary: Negative for hematuria.   Neurological: Positive for dizziness.        Objective:   /76 (BP Location: Left arm, Patient Position: Sitting, BP Cuff Size: Adult)   Pulse 81   Ht 1.702 m (5' 7\")   Wt 74.8 kg (165 lb)   BMI " 25.84 kg/m²     NO PE PERFORMED (TELEPHONE VISIT)  Reports sternotomy scar is well healed without signs of infection, pain    ECHOCARDIOGRAM: 6/20/2020  No prior study is available for comparison.   Technically difficult study - adequate information is obtained.   Normal left ventricular chamber size.  Mild concentric left ventricular hypertrophy.  Normal left ventricular systolic function.  [facility] 6/22/2020  Findings   Hemodynamics: Aorta: 130/75 mmHg  LV: 130/21 mmHg     Coronary Anatomy              Left Main: Dsital 80% stenosis              LAD: Proximal ectasia with extrinsic calcification and no significant stenosis              LCx: osital 30% stenosis              RCA: Dominant, 30% stenosis proximally. Distally there is a non-occlusive thrombus. The PDA has a mid thrombus, non-occlusive. The PLB is normal.                 Left Ventriculography: LVEF= 45%. The basal and mid inferior walls are hypokinetic. No MR, normal root.   IMPRESSIONS:  1. Acute NSTEMI vs recent IMI  2. Severe LM and RCA disease  3. Mildly impaired LVEF  4. Moderate elevation in LVEDP  Assessment:     1. Coronary artery disease involving native coronary artery of native heart without angina pectoris     2. NSTEMI (non-ST elevated myocardial infarction) (HCC)     3. S/P CABG x 3     4. Essential hypertension     5. Orthostatic hypotension     6. Chronic diastolic congestive heart failure (HCC)     7. Lower GI bleeding     8. Iron deficiency anemia due to chronic blood loss     9. Type 2 diabetes mellitus without complication, without long-term current use of insulin ([facility])         Medical Decision Making:  Today's Assessment / Status / Plan:   Essential hypertension  - now with orthostatic symptoms. Discussed maneuvers to try before standing  - try taking losartan in evenings, caution with getting up in AM, check BPs, if still low then can stop Losartan. May being having labile blood pressures due to autonomic dysfunction in setting of  parkinson's     Coronary artery disease involving native coronary artery of native heart without angina pectoris  S/p CABG x3 (LIMA to LAD, SVG to OM1, SVG to PDA) 6/25/20.  -continue aspirin 81mg PO daily for life  -continue plavix 75mg PO daily ideally for 12mo, follow CBC to check for occult anemia  -continue high intensity statin    Chronic obstructive pulmonary disease, unspecified COPD type (HCC)  Per local PCP.      History of Lower GI bleeding (?)  Associated with iron deficieincy anemia  - request labs to monitor CBC     RTC in 5-6mo, sooner if problems. Will communicate via [employer] re lab results when I receive them.

## 2020-09-14 NOTE — PATIENT INSTRUCTIONS
Try moving your Losartan dose to the evening times. Check your blood pressure in the AM and PM. Goal BP <130/80. If you are having problems with dizziness and low blood pressures please message me so we can stop the Losartan.

## 2024-04-03 ENCOUNTER — APPOINTMENT (RX ONLY)
Dept: URBAN - NONMETROPOLITAN AREA CLINIC 3 | Facility: CLINIC | Age: 74
Setting detail: DERMATOLOGY
End: 2024-04-03

## 2024-04-03 DIAGNOSIS — L85.3 XEROSIS CUTIS: ICD-10-CM | Status: INADEQUATELY CONTROLLED

## 2024-04-03 DIAGNOSIS — D22 MELANOCYTIC NEVI: ICD-10-CM

## 2024-04-03 DIAGNOSIS — L57.0 ACTINIC KERATOSIS: ICD-10-CM

## 2024-04-03 PROBLEM — D48.5 NEOPLASM OF UNCERTAIN BEHAVIOR OF SKIN: Status: ACTIVE | Noted: 2024-04-03

## 2024-04-03 PROCEDURE — 11301 SHAVE SKIN LESION 0.6-1.0 CM: CPT | Mod: 59,76

## 2024-04-03 PROCEDURE — 11311 SHAVE SKIN LESION 0.6-1.0 CM: CPT | Mod: 59,76

## 2024-04-03 PROCEDURE — ? LIQUID NITROGEN

## 2024-04-03 PROCEDURE — ? SHAVE REMOVAL

## 2024-04-03 PROCEDURE — ? PRESCRIPTION MEDICATION MANAGEMENT

## 2024-04-03 PROCEDURE — 99204 OFFICE O/P NEW MOD 45 MIN: CPT | Mod: 25

## 2024-04-03 PROCEDURE — ? COUNSELING

## 2024-04-03 PROCEDURE — 17004 DESTROY PREMAL LESIONS 15/>: CPT

## 2024-04-03 PROCEDURE — 11301 SHAVE SKIN LESION 0.6-1.0 CM: CPT | Mod: 59

## 2024-04-03 PROCEDURE — 11311 SHAVE SKIN LESION 0.6-1.0 CM: CPT | Mod: 59

## 2024-04-03 ASSESSMENT — LOCATION SIMPLE DESCRIPTION DERM
LOCATION SIMPLE: LEFT FOREARM
LOCATION SIMPLE: LEFT UPPER BACK
LOCATION SIMPLE: RIGHT EYEBROW
LOCATION SIMPLE: POSTERIOR SCALP
LOCATION SIMPLE: LEFT ELBOW
LOCATION SIMPLE: LEFT MIDDLE FINGER
LOCATION SIMPLE: SCALP
LOCATION SIMPLE: LEFT CHEEK
LOCATION SIMPLE: RIGHT UPPER ARM
LOCATION SIMPLE: LEFT UPPER ARM
LOCATION SIMPLE: LEFT HAND
LOCATION SIMPLE: RIGHT ELBOW
LOCATION SIMPLE: LEFT TEMPLE
LOCATION SIMPLE: RIGHT FOREARM
LOCATION SIMPLE: RIGHT CHEEK
LOCATION SIMPLE: RIGHT HAND

## 2024-04-03 ASSESSMENT — LOCATION ZONE DERM
LOCATION ZONE: ARM
LOCATION ZONE: HAND
LOCATION ZONE: SCALP
LOCATION ZONE: FINGER
LOCATION ZONE: FACE
LOCATION ZONE: TRUNK

## 2024-04-03 ASSESSMENT — LOCATION DETAILED DESCRIPTION DERM
LOCATION DETAILED: POSTERIOR MID-PARIETAL SCALP
LOCATION DETAILED: RIGHT RADIAL DORSAL HAND
LOCATION DETAILED: LEFT PROXIMAL DORSAL MIDDLE FINGER
LOCATION DETAILED: RIGHT LATERAL MALAR CHEEK
LOCATION DETAILED: RIGHT DISTAL POSTERIOR UPPER ARM
LOCATION DETAILED: LEFT PROXIMAL DORSAL FOREARM
LOCATION DETAILED: RIGHT SUPERIOR PARIETAL SCALP
LOCATION DETAILED: LEFT INFERIOR TEMPLE
LOCATION DETAILED: LEFT SUPERIOR LATERAL MALAR CHEEK
LOCATION DETAILED: LEFT DISTAL POSTERIOR UPPER ARM
LOCATION DETAILED: LEFT ELBOW
LOCATION DETAILED: RIGHT PROXIMAL DORSAL FOREARM
LOCATION DETAILED: LEFT SUPERIOR UPPER BACK
LOCATION DETAILED: RIGHT ELBOW
LOCATION DETAILED: LEFT DISTAL DORSAL FOREARM
LOCATION DETAILED: LEFT ULNAR DORSAL HAND
LOCATION DETAILED: RIGHT LATERAL EYEBROW
LOCATION DETAILED: RIGHT ULNAR DORSAL HAND
LOCATION DETAILED: RIGHT DISTAL DORSAL FOREARM
LOCATION DETAILED: LEFT VENTRAL DISTAL FOREARM
LOCATION DETAILED: RIGHT INFERIOR LATERAL MALAR CHEEK

## 2024-04-03 NOTE — PROCEDURE: MIPS QUALITY
Quality 130: Documentation Of Current Medications In The Medical Record: Current Medications Documented
Quality 431: Preventive Care And Screening: Unhealthy Alcohol Use - Screening: Patient not identified as an unhealthy alcohol user when screened for unhealthy alcohol use using a systematic screening method
Detail Level: Simple
Name And Contact Information For Health Care Proxy: Renetta Gutierrez [phone] wife
Quality 226: Preventive Care And Screening: Tobacco Use: Screening And Cessation Intervention: Patient screened for tobacco use and is an ex/non-smoker

## 2024-04-03 NOTE — PROCEDURE: PRESCRIPTION MEDICATION MANAGEMENT
Initiate Treatment: Cerave moisturizer
Render In Strict Bullet Format?: No
Detail Level: Zone
Plan: Follow up in 3 months

## 2024-04-03 NOTE — PROCEDURE: SHAVE REMOVAL
Medical Necessity Information: It is in your best interest to select a reason for this procedure from the list below. All of these items fulfill various CMS LCD requirements except the new and changing color options.
Medical Necessity Clause: This procedure was medically necessary because the lesion that was treated was:
Lab: [id number]
Lab Facility: [id number]
Body Location Override (Optional - Billing Will Still Be Based On Selected Body Map Location If Applicable): left forearm
Detail Level: Detailed
Was A Bandage Applied: Yes
Size Of Lesion In Cm (Required): 0.8
X Size Of Lesion In Cm (Optional): 0
Depth Of Shave: dermis
Biopsy Method: Dermablade
Anesthesia Type: 1% lidocaine with epinephrine
Hemostasis: Drysol
Wound Care: Petrolatum
Render Path Notes In Note?: No
Consent was obtained from the patient. The risks and benefits to therapy were discussed in detail. Specifically, the risks of infection, scarring, bleeding, prolonged wound healing, incomplete removal, allergy to anesthesia, nerve injury and recurrence were addressed. Prior to the procedure, the treatment site was clearly identified and confirmed by the patient. All components of Universal Protocol/PAUSE Rule completed.
Post-Care Instructions: I reviewed with the patient in detail post-care instructions. Patient is to keep the biopsy site dry overnight, and then apply bacitracin twice daily until healed. Patient may apply hydrogen peroxide soaks to remove any crusting.
Notification Instructions: Patient will be notified of pathology results. However, patient instructed to call the office if not contacted within 2 weeks.
Billing Type: Third-Party Bill
Body Location Override (Optional - Billing Will Still Be Based On Selected Body Map Location If Applicable): left thoracic pv
Body Location Override (Optional - Billing Will Still Be Based On Selected Body Map Location If Applicable): left superior temple
Size Of Lesion In Cm (Required): 0.9
Body Location Override (Optional - Billing Will Still Be Based On Selected Body Map Location If Applicable): left inferior temple

## 2024-04-03 NOTE — PROCEDURE: LIQUID NITROGEN
Post-Care Instructions: I reviewed with the patient in detail post-care instructions. Patient is to wear sunprotection, and avoid picking at any of the treated lesions. Pt may apply Vaseline to crusted or scabbing areas.
Detail Level: Simple
Duration Of Freeze Thaw-Cycle (Seconds): 0
Render Post-Care Instructions In Note?: no
Consent: The patient's consent was obtained including but not limited to risks of crusting, scabbing, blistering, scarring, darker or lighter pigmentary change, recurrence, incomplete removal and infection.
Total Number Of Aks Treated: 28

## 2025-03-25 ENCOUNTER — APPOINTMENT (OUTPATIENT)
Dept: URBAN - NONMETROPOLITAN AREA CLINIC 3 | Facility: CLINIC | Age: 75
Setting detail: DERMATOLOGY
End: 2025-03-25

## 2025-03-25 DIAGNOSIS — L57.0 ACTINIC KERATOSIS: ICD-10-CM

## 2025-03-25 DIAGNOSIS — D22 MELANOCYTIC NEVI: ICD-10-CM

## 2025-03-25 PROBLEM — D48.5 NEOPLASM OF UNCERTAIN BEHAVIOR OF SKIN: Status: ACTIVE | Noted: 2025-03-25

## 2025-03-25 PROCEDURE — 11301 SHAVE SKIN LESION 0.6-1.0 CM: CPT | Mod: 59

## 2025-03-25 PROCEDURE — 99213 OFFICE O/P EST LOW 20 MIN: CPT | Mod: 25

## 2025-03-25 PROCEDURE — ? SEPARATE AND IDENTIFIABLE DOCUMENTATION

## 2025-03-25 PROCEDURE — ? SHAVE REMOVAL

## 2025-03-25 PROCEDURE — ? LIQUID NITROGEN

## 2025-03-25 PROCEDURE — 11311 SHAVE SKIN LESION 0.6-1.0 CM: CPT | Mod: 59

## 2025-03-25 PROCEDURE — 17004 DESTROY PREMAL LESIONS 15/>: CPT

## 2025-03-25 PROCEDURE — ? COUNSELING

## 2025-03-25 PROCEDURE — 11301 SHAVE SKIN LESION 0.6-1.0 CM: CPT | Mod: 59,76

## 2025-03-25 PROCEDURE — 11310 SHAVE SKIN LESION 0.5 CM/<: CPT | Mod: 59

## 2025-03-25 ASSESSMENT — LOCATION SIMPLE DESCRIPTION DERM
LOCATION SIMPLE: LEFT CHEEK
LOCATION SIMPLE: LEFT FOREHEAD
LOCATION SIMPLE: LEFT FOREARM
LOCATION SIMPLE: RIGHT UPPER BACK
LOCATION SIMPLE: LEFT UPPER BACK
LOCATION SIMPLE: RIGHT HAND
LOCATION SIMPLE: LEFT HAND
LOCATION SIMPLE: RIGHT PRETIBIAL REGION
LOCATION SIMPLE: RIGHT FOREARM
LOCATION SIMPLE: RIGHT CHEEK
LOCATION SIMPLE: RIGHT FOREHEAD

## 2025-03-25 ASSESSMENT — LOCATION ZONE DERM
LOCATION ZONE: LEG
LOCATION ZONE: HAND
LOCATION ZONE: FACE
LOCATION ZONE: TRUNK
LOCATION ZONE: ARM

## 2025-03-25 ASSESSMENT — LOCATION DETAILED DESCRIPTION DERM
LOCATION DETAILED: RIGHT SUPERIOR FOREHEAD
LOCATION DETAILED: RIGHT SUPERIOR UPPER BACK
LOCATION DETAILED: RIGHT PROXIMAL PRETIBIAL REGION
LOCATION DETAILED: LEFT CENTRAL MALAR CHEEK
LOCATION DETAILED: LEFT SUPERIOR FOREHEAD
LOCATION DETAILED: RIGHT SUPERIOR LATERAL UPPER BACK
LOCATION DETAILED: LEFT ULNAR DORSAL HAND
LOCATION DETAILED: LEFT MID-UPPER BACK
LOCATION DETAILED: RIGHT RADIAL DORSAL HAND
LOCATION DETAILED: LEFT PROXIMAL DORSAL FOREARM
LOCATION DETAILED: LEFT MEDIAL UPPER BACK
LOCATION DETAILED: RIGHT DISTAL DORSAL FOREARM
LOCATION DETAILED: LEFT SUPERIOR LATERAL UPPER BACK
LOCATION DETAILED: RIGHT CENTRAL MALAR CHEEK

## 2025-03-25 NOTE — PROCEDURE: LIQUID NITROGEN
Consent: The patient's consent was obtained including but not limited to risks of crusting, scabbing, blistering, scarring, darker or lighter pigmentary change, recurrence, incomplete removal and infection.
Post-Care Instructions: I reviewed with the patient in detail post-care instructions. Patient is to wear sunprotection, and avoid picking at any of the treated lesions. Pt may apply Vaseline to crusted or scabbing areas.
Detail Level: Simple
Render Post-Care Instructions In Note?: no
Total Number Of Aks Treated: 27
Duration Of Freeze Thaw-Cycle (Seconds): 0

## 2025-03-25 NOTE — PROCEDURE: SHAVE REMOVAL
Medical Necessity Information: It is in your best interest to select a reason for this procedure from the list below. All of these items fulfill various CMS LCD requirements except the new and changing color options.
Medical Necessity Clause: This procedure was medically necessary because the lesion that was treated was:
Lab: [id number]
Lab Facility: [id number]
Body Location Override (Optional - Billing Will Still Be Based On Selected Body Map Location If Applicable): right lower leg
Detail Level: Detailed
Was A Bandage Applied: Yes
Size Of Lesion In Cm (Required): 0.7
X Size Of Lesion In Cm (Optional): 0
Depth Of Shave: dermis
Biopsy Method: Dermablade
Anesthesia Type: 1% lidocaine with epinephrine
Hemostasis: Drysol
Wound Care: Petrolatum
Render Path Notes In Note?: No
Consent was obtained from the patient. The risks and benefits to therapy were discussed in detail. Specifically, the risks of infection, scarring, bleeding, prolonged wound healing, incomplete removal, allergy to anesthesia, nerve injury and recurrence were addressed. Prior to the procedure, the treatment site was clearly identified and confirmed by the patient. All components of Universal Protocol/PAUSE Rule completed.
Post-Care Instructions: I reviewed with the patient in detail post-care instructions. Patient is to keep the biopsy site dry overnight, and then apply bacitracin twice daily until healed. Patient may apply hydrogen peroxide soaks to remove any crusting.
Notification Instructions: Patient will be notified of pathology results. However, patient instructed to call the office if not contacted within 2 weeks.
Billing Type: Third-Party Bill
Body Location Override (Optional - Billing Will Still Be Based On Selected Body Map Location If Applicable): right forearm
Size Of Lesion In Cm (Required): 0.9
Path Notes (To The Dermatopathologist): 0.9\\nMelan A stain
Body Location Override (Optional - Billing Will Still Be Based On Selected Body Map Location If Applicable): left scapula
Size Of Lesion In Cm (Required): 0.8
Body Location Override (Optional - Billing Will Still Be Based On Selected Body Map Location If Applicable): right thoracic pv
Body Location Override (Optional - Billing Will Still Be Based On Selected Body Map Location If Applicable): left thoracic pv
Size Of Lesion In Cm (Required): 0.6
Body Location Override (Optional - Billing Will Still Be Based On Selected Body Map Location If Applicable): superior forehead
Path Notes (To The Dermatopathologist): 0.6\\nMelan A stain
Body Location Override (Optional - Billing Will Still Be Based On Selected Body Map Location If Applicable): inferior forehead
Size Of Lesion In Cm (Required): 0.5
Path Notes (To The Dermatopathologist): 0.5\\nMelan A stain

## 2025-03-25 NOTE — PROCEDURE: MIPS QUALITY
Quality 130: Documentation Of Current Medications In The Medical Record: Current Medications Documented
Quality 431: Preventive Care And Screening: Unhealthy Alcohol Use - Screening: Patient not identified as an unhealthy alcohol user when screened for unhealthy alcohol use using a systematic screening method
Detail Level: Simple
Name And Contact Information For Health Care Proxy: Renetta Gutierrez [phone] wife
Quality 47: Advance Care Plan: Advance Care Planning discussed and documented; advance care plan or surrogate decision maker documented in the medical record.
Quality 226: Preventive Care And Screening: Tobacco Use: Screening And Cessation Intervention: Patient screened for tobacco use and is an ex/non-smoker
Quality 155 Part B (Denominator): Falls: Risk Screening: No documentation of falls status
Quality 155 Part A: Falls: Risk Assessment: Falls risk assessment completed and documented in the past 12 months.

## 2025-04-15 ENCOUNTER — APPOINTMENT (OUTPATIENT)
Dept: URBAN - NONMETROPOLITAN AREA CLINIC 3 | Facility: CLINIC | Age: 75
Setting detail: DERMATOLOGY
End: 2025-04-15

## 2025-04-15 DIAGNOSIS — D22 MELANOCYTIC NEVI: ICD-10-CM

## 2025-04-15 DIAGNOSIS — L81.4 OTHER MELANIN HYPERPIGMENTATION: ICD-10-CM

## 2025-04-15 DIAGNOSIS — L57.0 ACTINIC KERATOSIS: ICD-10-CM

## 2025-04-15 PROBLEM — D48.5 NEOPLASM OF UNCERTAIN BEHAVIOR OF SKIN: Status: ACTIVE | Noted: 2025-04-15

## 2025-04-15 PROBLEM — D22.9 MELANOCYTIC NEVI, UNSPECIFIED: Status: ACTIVE | Noted: 2025-04-15

## 2025-04-15 PROCEDURE — ? PATHOLOGY DISCUSSION

## 2025-04-15 PROCEDURE — 17003 DESTRUCT PREMALG LES 2-14: CPT

## 2025-04-15 PROCEDURE — 11302 SHAVE SKIN LESION 1.1-2.0 CM: CPT

## 2025-04-15 PROCEDURE — ? COUNSELING

## 2025-04-15 PROCEDURE — 11311 SHAVE SKIN LESION 0.6-1.0 CM: CPT

## 2025-04-15 PROCEDURE — ? LIQUID NITROGEN

## 2025-04-15 PROCEDURE — 11310 SHAVE SKIN LESION 0.5 CM/<: CPT

## 2025-04-15 PROCEDURE — ? SHAVE REMOVAL

## 2025-04-15 PROCEDURE — 99213 OFFICE O/P EST LOW 20 MIN: CPT | Mod: 25

## 2025-04-15 PROCEDURE — ? SUNSCREEN TREATMENT REGIMEN

## 2025-04-15 PROCEDURE — 17000 DESTRUCT PREMALG LESION: CPT | Mod: 59

## 2025-04-15 ASSESSMENT — LOCATION DETAILED DESCRIPTION DERM
LOCATION DETAILED: RIGHT INFERIOR MEDIAL UPPER BACK
LOCATION DETAILED: LEFT LATERAL FOREHEAD
LOCATION DETAILED: RIGHT MID-UPPER BACK
LOCATION DETAILED: LEFT INFERIOR FOREHEAD
LOCATION DETAILED: LEFT MID-UPPER BACK

## 2025-04-15 ASSESSMENT — LOCATION SIMPLE DESCRIPTION DERM
LOCATION SIMPLE: LEFT FOREHEAD
LOCATION SIMPLE: LEFT UPPER BACK
LOCATION SIMPLE: RIGHT UPPER BACK

## 2025-04-15 ASSESSMENT — LOCATION ZONE DERM
LOCATION ZONE: FACE
LOCATION ZONE: TRUNK

## 2025-04-15 NOTE — PROCEDURE: SHAVE REMOVAL
Medical Necessity Information: It is in your best interest to select a reason for this procedure from the list below. All of these items fulfill various CMS LCD requirements except the new and changing color options.
Medical Necessity Clause: This procedure was medically necessary because the lesion that was treated was:
Lab: [id number]
Lab Facility: [id number]
Body Location Override (Optional - Billing Will Still Be Based On Selected Body Map Location If Applicable): right thoracic pv
Detail Level: Detailed
Was A Bandage Applied: Yes
Size Of Lesion In Cm (Required): 1.1
X Size Of Lesion In Cm (Optional): 0
Depth Of Shave: dermis
Biopsy Method: Dermablade
Anesthesia Type: 1% lidocaine with epinephrine
Hemostasis: Drysol
Wound Care: Petrolatum
Render Path Notes In Note?: No
Consent was obtained from the patient. The risks and benefits to therapy were discussed in detail. Specifically, the risks of infection, scarring, bleeding, prolonged wound healing, incomplete removal, allergy to anesthesia, nerve injury and recurrence were addressed. Prior to the procedure, the treatment site was clearly identified and confirmed by the patient. All components of Universal Protocol/PAUSE Rule completed.
Post-Care Instructions: I reviewed with the patient in detail post-care instructions. Patient is to keep the biopsy site dry overnight, and then apply bacitracin twice daily until healed. Patient may apply hydrogen peroxide soaks to remove any crusting.
Notification Instructions: Patient will be notified of pathology results. However, patient instructed to call the office if not contacted within 2 weeks.
Billing Type: Third-Party Bill
Body Location Override (Optional - Billing Will Still Be Based On Selected Body Map Location If Applicable): inferior forehead
Size Of Lesion In Cm (Required): 0.7
Body Location Override (Optional - Billing Will Still Be Based On Selected Body Map Location If Applicable): Mid left lateral forehead
Size Of Lesion In Cm (Required): 0.5

## 2025-04-15 NOTE — PROCEDURE: MIPS QUALITY
Quality 137: Melanoma: Continuity Of Care - Recall System: Patient information entered into a recall system that includes: target date for the next exam specified AND a process to follow up with patients regarding missed or unscheduled appointments
Quality 130: Documentation Of Current Medications In The Medical Record: Current Medications Documented
Quality 431: Preventive Care And Screening: Unhealthy Alcohol Use - Screening: Patient not identified as an unhealthy alcohol user when screened for unhealthy alcohol use using a systematic screening method
Detail Level: Simple
Name And Contact Information For Health Care Proxy: Renetta Gutierrez [phone] wife
Quality 47: Advance Care Plan: Advance Care Planning discussed and documented; advance care plan or surrogate decision maker documented in the medical record.
Quality 226: Preventive Care And Screening: Tobacco Use: Screening And Cessation Intervention: Patient screened for tobacco use and is an ex/non-smoker

## 2025-04-15 NOTE — PROCEDURE: LIQUID NITROGEN
Render In Bullet Format When Appropriate: No
Detail Level: Simple
Duration Of Freeze Thaw-Cycle (Seconds): 0
Total Number Of Aks Treated: 5
Consent: The patient's consent was obtained including but not limited to risks of crusting, scabbing, blistering, scarring, darker or lighter pigmentary change, recurrence, incomplete removal and infection.
Post-Care Instructions: I reviewed with the patient in detail post-care instructions. Patient is to wear sunprotection, and avoid picking at any of the treated lesions. Pt may apply Vaseline to crusted or scabbing areas.

## 2025-07-29 ENCOUNTER — APPOINTMENT (OUTPATIENT)
Dept: URBAN - NONMETROPOLITAN AREA CLINIC 3 | Facility: CLINIC | Age: 75
Setting detail: DERMATOLOGY
End: 2025-07-29

## 2025-07-29 DIAGNOSIS — L57.0 ACTINIC KERATOSIS: ICD-10-CM

## 2025-07-29 DIAGNOSIS — L71.8 OTHER ROSACEA: ICD-10-CM

## 2025-07-29 PROBLEM — D48.5 NEOPLASM OF UNCERTAIN BEHAVIOR OF SKIN: Status: ACTIVE | Noted: 2025-07-29

## 2025-07-29 PROCEDURE — ? PRESCRIPTION MEDICATION MANAGEMENT

## 2025-07-29 PROCEDURE — ? BIOPSY BY SHAVE METHOD

## 2025-07-29 PROCEDURE — ? COUNSELING

## 2025-07-29 PROCEDURE — ? LIQUID NITROGEN

## 2025-07-29 PROCEDURE — ? PRESCRIPTION

## 2025-07-29 RX ORDER — AZELAIC ACID 0.15 G/G
GEL TOPICAL
Qty: 50 | Refills: 0 | Status: ERX | COMMUNITY
Start: 2025-07-29

## 2025-07-29 RX ADMIN — AZELAIC ACID: 0.15 GEL TOPICAL at 00:00

## 2025-07-29 ASSESSMENT — LOCATION DETAILED DESCRIPTION DERM
LOCATION DETAILED: RIGHT RADIAL DORSAL HAND
LOCATION DETAILED: RIGHT CENTRAL MALAR CHEEK
LOCATION DETAILED: LEFT DISTAL POSTERIOR UPPER ARM
LOCATION DETAILED: LEFT INFERIOR CENTRAL MALAR CHEEK
LOCATION DETAILED: RIGHT DISTAL POSTERIOR UPPER ARM
LOCATION DETAILED: LEFT DISTAL DORSAL FOREARM
LOCATION DETAILED: RIGHT PROXIMAL DORSAL FOREARM
LOCATION DETAILED: RIGHT MEDIAL FOREHEAD
LOCATION DETAILED: LEFT ULNAR DORSAL HAND

## 2025-07-29 ASSESSMENT — LOCATION ZONE DERM
LOCATION ZONE: HAND
LOCATION ZONE: ARM
LOCATION ZONE: FACE

## 2025-07-29 ASSESSMENT — LOCATION SIMPLE DESCRIPTION DERM
LOCATION SIMPLE: RIGHT UPPER ARM
LOCATION SIMPLE: RIGHT FOREHEAD
LOCATION SIMPLE: RIGHT CHEEK
LOCATION SIMPLE: RIGHT FOREARM
LOCATION SIMPLE: LEFT HAND
LOCATION SIMPLE: LEFT FOREARM
LOCATION SIMPLE: LEFT UPPER ARM
LOCATION SIMPLE: LEFT CHEEK
LOCATION SIMPLE: RIGHT HAND

## 2025-07-29 NOTE — PROCEDURE: MIPS QUALITY
Quality 130: Documentation Of Current Medications In The Medical Record: Current Medications Documented
Quality 431: Preventive Care And Screening: Unhealthy Alcohol Use - Screening: Patient not identified as an unhealthy alcohol user when screened for unhealthy alcohol use using a systematic screening method
Detail Level: Simple
Name And Contact Information For Health Care Proxy: Renetta Gutierrez [phone] wife
Quality 487: Screening For Social Drivers Of Health: Patient reason for not screening for food insecurity, housing instability, transportation needs, utility difficulties, and interpersonal safety (e.g., patient declined or other patient reasons)
Quality 47: Advance Care Plan: Advance Care Planning discussed and documented; advance care plan or surrogate decision maker documented in the medical record.
Quality 226: Preventive Care And Screening: Tobacco Use: Screening And Cessation Intervention: Patient screened for tobacco use and is an ex/non-smoker

## 2025-07-29 NOTE — PROCEDURE: LIQUID NITROGEN
Duration Of Freeze Thaw-Cycle (Seconds): 0
Detail Level: Simple
Post-Care Instructions: I reviewed with the patient in detail post-care instructions. Patient is to wear sunprotection, and avoid picking at any of the treated lesions. Pt may apply Vaseline to crusted or scabbing areas.
Consent: The patient's consent was obtained including but not limited to risks of crusting, scabbing, blistering, scarring, darker or lighter pigmentary change, recurrence, incomplete removal and infection.
Render Post-Care Instructions In Note?: no
Total Number Of Aks Treated: 20

## 2025-07-29 NOTE — PROCEDURE: BIOPSY BY SHAVE METHOD
Body Location Override (Optional - Billing Will Still Be Based On Selected Body Map Location If Applicable): left forearm
Detail Level: Detailed
Depth Of Biopsy: dermis
Was A Bandage Applied: Yes
Size Of Lesion In Cm: 0.9
X Size Of Lesion In Cm: 0
Biopsy Type: H and E
Biopsy Method: Dermablade
Anesthesia Type: 1% lidocaine with epinephrine
Anesthesia Volume In Cc: 0.5
Hemostasis: Drysol
Wound Care: Bacitracin
Dressing: bandage
Destruction After The Procedure: No
Type Of Destruction Used: Curettage
Curettage Text: The wound bed was treated with curettage after the biopsy was performed.
Cryotherapy Text: The wound bed was treated with cryotherapy after the biopsy was performed.
Electrodesiccation Text: The wound bed was treated with electrodesiccation after the biopsy was performed.
Electrodesiccation And Curettage Text: The wound bed was treated with electrodesiccation and curettage after the biopsy was performed.
Silver Nitrate Text: The wound bed was treated with silver nitrate after the biopsy was performed.
Lab: [id number]
Lab Facility: [id number]
Medical Necessity Information: It is in your best interest to select a reason for this procedure from the list below. All of these items fulfill various CMS LCD requirements except the new and changing color options.
Consent: Written consent was obtained and risks were reviewed including but not limited to scarring, infection, bleeding, scabbing, incomplete removal, nerve damage and allergy to anesthesia.
Post-Care Instructions: I reviewed with the patient in detail post-care instructions. Patient is to keep the biopsy site dry overnight, and then apply bacitracin twice daily until healed. Patient may apply hydrogen peroxide soaks to remove any crusting.
Notification Instructions: Patient will be notified of biopsy results. However, patient instructed to call the office if not contacted within 2 weeks.
Billing Type: Third-Party Bill
Information: Selecting Yes will display possible errors in your note based on the variables you have selected. This validation is only offered as a suggestion for you. PLEASE NOTE THAT THE VALIDATION TEXT WILL BE REMOVED WHEN YOU FINALIZE YOUR NOTE. IF YOU WANT TO FAX A PRELIMINARY NOTE YOU WILL NEED TO TOGGLE THIS TO 'NO' IF YOU DO NOT WANT IT IN YOUR FAXED NOTE.

## (undated) DEVICE — SENSOR CEREBRAL AND SOMATIC MONITORING (20/CA)

## (undated) DEVICE — CHLORAPREP 26 ML APPLICATOR - ORANGE TINT(25/CA)

## (undated) DEVICE — KIT CATHETERIZATION TWO-LUMEN VENOUS 8FR (5EA/CA)

## (undated) DEVICE — SUTURE 0 ETHIBOND MO6 C/R - (12/BX) 8-18 INCH ETHICON

## (undated) DEVICE — SET BIFURCATED BLOOD - (48EA/CS)

## (undated) DEVICE — PACKING VAG 2 X-RAY (24EA/CS)

## (undated) DEVICE — KIT RADIAL ARTERY 20GA W/MAX BARRIER AND BIOPATCH  (5EA/CA) #10740 IS FOR THE SET RADIAL ARTERIAL

## (undated) DEVICE — FIBRILLAR SURGICEL 4X4 - 10/CA

## (undated) DEVICE — TUBE CHEST 32FR. RIGHT ANGLED (10EA/CA)

## (undated) DEVICE — GLOVE BIOGEL SZ 8 SURGICAL PF LTX - (50PR/BX 4BX/CA)

## (undated) DEVICE — SENSOR SPO2 NEO LNCS ADHESIVE (20/BX) SEE USER NOTES

## (undated) DEVICE — SUTURE 3-0 PROLENE SH 36 (36PK/BX)"

## (undated) DEVICE — SUCTION INSTRUMENT YANKAUER BULBOUS TIP W/O VENT (50EA/CA)

## (undated) DEVICE — CONNECTOR Y TBG CRTY 5 IN 1 STERILE (50EA/CA)

## (undated) DEVICE — SET FLUID WARMING STANDARD FLOW - (10/CA)

## (undated) DEVICE — LACTATED RINGERS INJ 1000 ML - (14EA/CA 60CA/PF)

## (undated) DEVICE — SET LEADWIRE 5 LEAD BEDSIDE DISPOSABLE ECG (1SET OF 5/EA)

## (undated) DEVICE — BLADE STERNUM SAW SURGICAL 32.0 X 6.4 MM STERILE (1/EA)

## (undated) DEVICE — SLEEVE, VASO, THIGH, MED

## (undated) DEVICE — TUBING INSUFFLATION - (10/BX)

## (undated) DEVICE — CORETEMP DRAPE, FORM-FITTED, EASY DROPANDGO DRAPE FOR USE ON THE CORETEMP FLUID MANAGEMENT 56 X 56""

## (undated) DEVICE — SYSTEM CHEST DRAIN ADULT/PEDS W/AUTO TRANSFUSION CAPABILITY SAHARA (6EA/CA)

## (undated) DEVICE — GOWN SURGEONS X-LARGE - DISP. (30/CA)

## (undated) DEVICE — TOWELS CLOTH SURGICAL - (4/PK 20PK/CA)

## (undated) DEVICE — MICRODRIP PRIMARY VENTED 60 (48EA/CA) THIS WAS PART #2C8428 WHICH WAS DISCONTINUED

## (undated) DEVICE — SUTURE 0 COATED VICRYL PLUS - CTX CR(12/BX)18 IN

## (undated) DEVICE — D-5-W INJ. 500 ML - (24EA/CA)

## (undated) DEVICE — PACK CV DRAPING/BASIN 2PART - (1/CA)

## (undated) DEVICE — TUBE CHEST 32FR. STRAIGHT - (10EA/CA)

## (undated) DEVICE — MASK ANESTHESIA ADULT  - (100/CA)

## (undated) DEVICE — DRESSING LEUKOMED STERILE 11.75X4IN - (50/CA)

## (undated) DEVICE — PACK E SUTURE USED FOR - OPEN HEART  (5/BX)

## (undated) DEVICE — SUTURE 4-0 30CM STRATAFIX SPIRAL PS-2 (12EA/BX)

## (undated) DEVICE — PACK VEIN - (19/CA)

## (undated) DEVICE — ELECTRODE 850 FOAM ADHESIVE - HYDROGEL RADIOTRNSPRNT (50/PK)

## (undated) DEVICE — TUBE E-T HI-LO CUFF 8.5MM (10EA/PK)

## (undated) DEVICE — DECANTER FLD BLS - (50/CA)

## (undated) DEVICE — SUTURE 7-0 PROLENE 24BV175-6 - EP8735H (36/BX)"

## (undated) DEVICE — SUTURE 4-0 PROLENE RB-1 D/A 36 (36PK/BX)"

## (undated) DEVICE — PUNCH 4MM SHRP CNCL TIP DL - (6/BX)

## (undated) DEVICE — BLADE BEAVER 6900 MINI SHARP ALL AROUND (20/CA)

## (undated) DEVICE — DERMABOND ADVANCED - (12EA/BX)

## (undated) DEVICE — SUTURE OHS

## (undated) DEVICE — SUTURE 5-0 PROLENE C-1 D/A 24 (36PK/BX)"

## (undated) DEVICE — DILATORS PARSONNET 1.5MM

## (undated) DEVICE — STAPLER SKIN DISP - (6/BX 10BX/CA) VISISTAT

## (undated) DEVICE — SUTURE 5-0 PROLENE RB-1 D/A 36 (36PK/BX)"

## (undated) DEVICE — STOPCOCK MALE 4-WAY - (50/CA)

## (undated) DEVICE — CANISTER SUCTION 3000ML MECHANICAL FILTER AUTO SHUTOFF MEDI-VAC NONSTERILE LF DISP  (40EA/CA)

## (undated) DEVICE — BLADE SURGICAL #15 - (50/BX 3BX/CA)

## (undated) DEVICE — TRANSDUCER BIFURCATED MONITORING KIT (10EA/CA)

## (undated) DEVICE — TUBING CLEARLINK DUO-VENT - C-FLO (48EA/CA)

## (undated) DEVICE — SOD. CHL. INJ. 0.9% 1000 ML - (14EA/CA 60CA/PF)

## (undated) DEVICE — BAG RESUSCITATION DISPOSABLE - WITH MASK (10 EA/CA)

## (undated) DEVICE — SYRINGE 30 ML LL (56/BX)

## (undated) DEVICE — INSERT STEALTH #5 - (10/BX)

## (undated) DEVICE — SPONGE GAUZE STER 4X4 8-PL - (2/PK 50PK/BX 12BX/CS)

## (undated) DEVICE — SYRINGE SAFETY 3 ML 18 GA X 1 1/2 BLUNT LL (100/BX 8BX/CA)

## (undated) DEVICE — SODIUM CHL. INJ. 0.9% 500ML (24EA/CA 50CA/PF)

## (undated) DEVICE — GOWN SURGICAL XX-LARGE - (28EA/CA) SIRUS NON REINFORCED

## (undated) DEVICE — SUTURE 6-0 PROLENE RB-2 D/A 30 (36PK/BX)"

## (undated) DEVICE — SPONGE XRAY 8X4 STERL. 12PL - (10EA/TY 80TY/CA)

## (undated) DEVICE — DRESSING TRANSPARENT FILM TEGADERM 2.375 X 2.75"  (100EA/BX)"

## (undated) DEVICE — BLOWER/MISTER (5EA/PK)

## (undated) DEVICE — PROTECTOR ULNA NERVE - (36PR/CA)

## (undated) DEVICE — LEAD PACING TEMP MYO - (12/BX)

## (undated) DEVICE — POLY UMBILICAL TAPE 1/8X30 - (36/BX)

## (undated) DEVICE — GLOVE SZ 7.5 BIOGEL PI MICRO - PF LF (50PR/BX)

## (undated) DEVICE — TUBE CONNECT SUCTION CLEAR 120 X 1/4" (50EA/CA)"

## (undated) DEVICE — BAG SPONGE COUNT 10.25 X 32 - BLUE (250/CA)

## (undated) DEVICE — KIT ENDOHARVEST SYSTEM 8 - MUST ORDER 5 AT A TIME

## (undated) DEVICE — GLOVE BIOGEL PI INDICATOR SZ 8.0 SURGICAL PF LF -(50/BX 4BX/CA)

## (undated) DEVICE — CATHETER SUCTION 14 FR. WITH CONTROL PORT (100/CS)

## (undated) DEVICE — NEEDLE SAFETY 18 GA X 1 1/2 IN (100EA/BX)

## (undated) DEVICE — GLOVE BIOGEL PI INDICATOR SZ 8.5 SURGICAL PF LF - (50PR/BX 4BX/CA)

## (undated) DEVICE — SET EXTENSION WITH 2 PORTS (48EA/CA) ***PART #2C8610 IS A SUBSTITUTE*****

## (undated) DEVICE — GLOVE BIOGEL SZ 6.5 SURGICAL PF LTX (50PR/BX 4BX/CA)

## (undated) DEVICE — TRAY SURESTEP FOLEY TEMP SENSING 16FR (10EA/CA) ORDER  #18764 FOR TEMP FOLEY ONLY

## (undated) DEVICE — KIT ANESTHESIA W/CIRCUIT & 3/LT BAG W/FILTER (20EA/CA)

## (undated) DEVICE — KIT TOURNIQUET DLP (40EA/PK)

## (undated) DEVICE — SPRING BULLDOG 1/2 FORCE BLUE - (10/BX)

## (undated) DEVICE — ARMBOARD  SMALL IV 9 INLONG - (25EA/CA)

## (undated) DEVICE — SUTURE 5 SURGICAL STEEL V-40 - (12/BX) CCS CURRENT

## (undated) DEVICE — HEAD HOLDER JUNIOR/ADULT

## (undated) DEVICE — SODIUM CHL IRRIGATION 0.9% 1000ML (12EA/CA)

## (undated) DEVICE — NEPTUNE 4 PORT MANIFOLD - (20/PK)

## (undated) DEVICE — DRESSING TRANSPARENT FILM TEGADERM 4 X 4.75" (50EA/BX)"

## (undated) DEVICE — ELECTRODE DUAL RETURN W/ CORD - (50/PK)

## (undated) DEVICE — SOLUTION NORMOSOL-4 INJ 1000ML

## (undated) DEVICE — SYS DLV COST CLS RM TEMP - INJECTATE (CO-SET II) (10EA/CA)

## (undated) DEVICE — GLOVE BIOGEL INDICATOR SZ 8.5 SURGICAL PF LTX - (50/BX 4BX/CA)

## (undated) DEVICE — SLEEVE STERILE  A599T - 30/BX 2BX/CS